# Patient Record
Sex: MALE | Race: WHITE | NOT HISPANIC OR LATINO | Employment: OTHER | URBAN - NONMETROPOLITAN AREA
[De-identification: names, ages, dates, MRNs, and addresses within clinical notes are randomized per-mention and may not be internally consistent; named-entity substitution may affect disease eponyms.]

---

## 2017-02-20 ENCOUNTER — OFFICE VISIT (OUTPATIENT)
Dept: FAMILY MEDICINE CLINIC | Facility: CLINIC | Age: 67
End: 2017-02-20

## 2017-02-20 ENCOUNTER — APPOINTMENT (OUTPATIENT)
Dept: LAB | Facility: HOSPITAL | Age: 67
End: 2017-02-20

## 2017-02-20 VITALS
SYSTOLIC BLOOD PRESSURE: 128 MMHG | DIASTOLIC BLOOD PRESSURE: 80 MMHG | HEIGHT: 68 IN | TEMPERATURE: 97.9 F | BODY MASS INDEX: 34.59 KG/M2 | OXYGEN SATURATION: 98 % | HEART RATE: 55 BPM | WEIGHT: 228.2 LBS

## 2017-02-20 DIAGNOSIS — Z23 NEED FOR SHINGLES VACCINE: ICD-10-CM

## 2017-02-20 DIAGNOSIS — Z23 NEED FOR STREPTOCOCCUS PNEUMONIAE VACCINATION: ICD-10-CM

## 2017-02-20 DIAGNOSIS — R35.1 NOCTURIA: ICD-10-CM

## 2017-02-20 DIAGNOSIS — Z13.6 ENCOUNTER FOR ABDOMINAL AORTIC ANEURYSM (AAA) SCREENING: ICD-10-CM

## 2017-02-20 DIAGNOSIS — E11.9 TYPE 2 DIABETES MELLITUS WITHOUT COMPLICATION, WITHOUT LONG-TERM CURRENT USE OF INSULIN (HCC): ICD-10-CM

## 2017-02-20 DIAGNOSIS — Z00.00 MEDICARE ANNUAL WELLNESS VISIT, SUBSEQUENT: Primary | ICD-10-CM

## 2017-02-20 DIAGNOSIS — Z87.891 FORMER TOBACCO USE: ICD-10-CM

## 2017-02-20 LAB
HBA1C MFR BLD: 6.79 % (ref 4–5.6)
PSA SERPL-MCNC: 0.56 NG/ML (ref 0–4)

## 2017-02-20 PROCEDURE — 36415 COLL VENOUS BLD VENIPUNCTURE: CPT | Performed by: FAMILY MEDICINE

## 2017-02-20 PROCEDURE — G0009 ADMIN PNEUMOCOCCAL VACCINE: HCPCS | Performed by: FAMILY MEDICINE

## 2017-02-20 PROCEDURE — 84153 ASSAY OF PSA TOTAL: CPT | Performed by: FAMILY MEDICINE

## 2017-02-20 PROCEDURE — 83036 HEMOGLOBIN GLYCOSYLATED A1C: CPT | Performed by: FAMILY MEDICINE

## 2017-02-20 PROCEDURE — 90670 PCV13 VACCINE IM: CPT | Performed by: FAMILY MEDICINE

## 2017-02-20 PROCEDURE — G0439 PPPS, SUBSEQ VISIT: HCPCS | Performed by: FAMILY MEDICINE

## 2017-02-20 RX ORDER — GLIPIZIDE 5 MG/1
2.5 TABLET ORAL
Qty: 60 TABLET | Refills: 5 | Status: SHIPPED | OUTPATIENT
Start: 2017-02-20 | End: 2018-02-20 | Stop reason: SDUPTHER

## 2017-02-20 NOTE — PROGRESS NOTES
QUICK REFERENCE INFORMATION:  The ABCs of the Annual Wellness Visit    Subsequent Medicare Wellness Visit    HEALTH RISK ASSESSMENT    Recent Hospitalizations:  No recent hospitalization(s)..      Current Medical Providers:  Patient Care Team:  Nithya Martini DO as PCP - General (Family Medicine)        Smoking Status:  History   Smoking Status   • Former Smoker   Smokeless Tobacco   • Former User   Quit 40 years ago    Alcohol Consumption:  History   Alcohol Use No       Depression Screen:   PHQ-9 Depression Screening 2/20/2017   Little interest or pleasure in doing things 0   Feeling down, depressed, or hopeless 0   Trouble falling or staying asleep, or sleeping too much 0   Feeling tired or having little energy 1   Poor appetite or overeating 1   Feeling bad about yourself - or that you are a failure or have let yourself or your family down 0   Trouble concentrating on things, such as reading the newspaper or watching television 0   Moving or speaking so slowly that other people could have noticed. Or the opposite - being so fidgety or restless that you have been moving around a lot more than usual 0   Thoughts that you would be better off dead, or of hurting yourself in some way 0   PHQ-9 Total Score 2   If you checked off any problems, how difficult have these problems made it for you to do your work, take care of things at home, or get along with other people? Not difficult at all       Health Habits and Functional and Cognitive Screening:  Functional & Cognitive Status 2/20/2017   Do you have difficulty preparing food and eating? No   Do you have difficulty bathing yourself? No   Do you have difficulty getting dressed? No   Do you have difficulty using the toilet? No   Do you have difficulty moving around from place to place? No   In the past year have you fallen or experienced a near fall? Yes   Do you need help using the phone?  No   Are you deaf or do you have serious difficulty hearing?  No   Do you need  help with transportation? No   Do you need help shopping? No   Do you need help preparing meals?  No   Do you need help with housework?  No   Do you need help with laundry? No   Do you need help taking your medications? No   Do you need help managing money? No   Do you have difficulty concentrating, remembering or making decisions? No          Does the patient have evidence of cognitive impairment? No    Asprin use counseling:not applicable    TUG test 9 seconds.     Hearing screen negative.    Recent Lab Results:  CMP:  Lab Results   Component Value Date    BUN 20 11/15/2016    CREATININE 1.0 11/15/2016     11/15/2016    K 5.0 11/15/2016    CO2 29 11/15/2016    CALCIUM 9.2 11/15/2016    ALBUMIN 4.2 11/15/2016    BILITOT 0.7 11/15/2016    ALKPHOS 55 11/15/2016    AST 27 11/15/2016    ALT 34 11/15/2016     Lipid Panel:  Lab Results   Component Value Date    CHLPL 172 04/07/2016    TRIG 195 04/07/2016    HDL 32 (L) 04/07/2016     LDL:     HbA1c:  Lab Results   Component Value Date    HGBA1C 6.79 (H) 02/20/2017     Urine Microalbumin:     Visual Acuity:  No exam data present    Age-appropriate Screening Schedule:  Refer to the list below for future screening recommendations based on patient's age, sex and/or medical conditions. Orders for these recommended tests are listed in the plan section. The patient has been provided with a written plan.    Health Maintenance   Topic Date Due   • TDAP/TD VACCINES (1 - Tdap) 12/05/1968   • PNEUMOCOCCAL VACCINES (65+ LOW/MEDIUM RISK) (1 of 2 - PCV13) 12/05/2014   • DIABETIC EYE EXAM  11/15/2016   • ZOSTER VACCINE  11/15/2016   • URINE MICROALBUMIN  04/07/2017   • HEMOGLOBIN A1C  05/15/2017   • DIABETIC FOOT EXAM  11/15/2017   • COLONOSCOPY  04/19/2022   • INFLUENZA VACCINE  Completed        Subjective   History of Present Illness    Hung Ramsay is a 66 y.o. male who presents for an Subsequent Wellness Visit. In addition, we addressed the following health issues:  Diabetes    Blood sugars running high, sometimes in the high 100's-low 200s.. Needs a1c. No hypoglycemia. Not really following diabetic diet.    The following portions of the patient's history were reviewed and updated as appropriate: allergies, current medications, past family history, past medical history, past social history, past surgical history and problem list.    Outpatient Medications Prior to Visit   Medication Sig Dispense Refill   • aspirin 81 MG EC tablet Take 81 mg by mouth Daily.     • esomeprazole (nexIUM) 40 MG capsule Take 1 capsule by mouth Every Morning Before Breakfast. 90 capsule 1   • fenofibrate (TRICOR) 145 MG tablet Take 1 tablet by mouth Daily. 90 tablet 1   • glipiZIDE (GLUCOTROL) 5 MG tablet Take 0.5 tablets by mouth Daily. 45 tablet 1   • losartan-hydrochlorothiazide (HYZAAR) 100-25 MG per tablet Take 1 tablet by mouth Daily. 90 tablet 1   • metFORMIN (GLUCOPHAGE) 1000 MG tablet Take 1 tablet by mouth 2 (Two) Times a Day With Meals. 180 tablet 1   • metoprolol succinate XL (TOPROL-XL) 100 MG 24 hr tablet Take 1 tablet by mouth Daily. 90 tablet 1   • PARoxetine CR (PAXIL-CR) 25 MG 24 hr tablet Take 1 tablet by mouth Daily. 90 tablet 1   • traZODone (DESYREL) 50 MG tablet Take 1 tablet by mouth Every Night. 90 tablet 1     No facility-administered medications prior to visit.        Patient Active Problem List   Diagnosis   • Essential hypertension   • Diabetes   • GERD (gastroesophageal reflux disease)   • Insomnia   • Anxiety       Identification of Risk Factors:  Risk factors include: weight  and tobacco use.    Review of Systems   Constitutional: Negative for fatigue and fever.   Respiratory: Negative for shortness of breath.    Cardiovascular: Negative for chest pain and leg swelling.   Endocrine: Negative for polydipsia and polyphagia.   Genitourinary: Negative for difficulty urinating and hematuria.        Nocturia (chronic)   Neurological: Negative for dizziness.       Compared to  "one year ago, the patient feels his physical health is the same.  Compared to one year ago, the patient feels his mental health is better.    Objective     Physical Exam   Constitutional: He appears well-developed and well-nourished. No distress.   Cardiovascular: Normal rate, regular rhythm and normal heart sounds.  Exam reveals no gallop and no friction rub.    No murmur heard.  Pulmonary/Chest: Effort normal and breath sounds normal. He has no wheezes. He has no rales.   Musculoskeletal: He exhibits no edema.   Neurological: He is alert.   Skin: Skin is warm and dry. He is not diaphoretic.   Psychiatric: He has a normal mood and affect. His behavior is normal.   Nursing note and vitals reviewed.      Vitals:    02/20/17 1109   BP: 128/80   Pulse: 55   Temp: 97.9 °F (36.6 °C)   SpO2: 98%   Weight: 228 lb 3.2 oz (104 kg)   Height: 68\" (172.7 cm)   PainSc: 0-No pain       Body mass index is 34.7 kg/(m^2).  Discussed the patient's BMI with him. The BMI is above average; BMI management plan is completed.    Assessment/Plan   Patient Self-Management and Personalized Health Advice  The patient has been provided with information about: diet, exercise and weight management and preventive services including:   · Diabetes screening, see lab orders, Exercise counseling provided, Screening for AAA, referral for ultrasound placed.    Visit Diagnoses:    ICD-10-CM ICD-9-CM   1. Medicare annual wellness visit, subsequent Z00.00 V70.0   2. Type 2 diabetes mellitus without complication, without long-term current use of insulin E11.9 250.00   3. Need for Streptococcus pneumoniae vaccination Z23 V03.82   4. Nocturia  R35.1 788.43   5. Need for shingles vaccine Z23 V04.89   6. Encounter for abdominal aortic aneurysm (AAA) screening Z13.6 V81.2   7. Former tobacco use Z87.891 V15.82       Orders Placed This Encounter   Procedures   • US AAA Screen Limited     Order Specific Question:   Reason for Exam:     Answer:   screening AAA, hx " of tobacco use   • Pneumococcal Conjugate Vaccine 13-Valent All   • Hemoglobin A1c   • PSA     Increased Glipizide to 0.5 bid. Checking labs today    Outpatient Encounter Prescriptions as of 2/20/2017   Medication Sig Dispense Refill   • aspirin 81 MG EC tablet Take 81 mg by mouth Daily.     • esomeprazole (nexIUM) 40 MG capsule Take 1 capsule by mouth Every Morning Before Breakfast. 90 capsule 1   • fenofibrate (TRICOR) 145 MG tablet Take 1 tablet by mouth Daily. 90 tablet 1   • glipiZIDE (GLUCOTROL) 5 MG tablet Take 0.5 tablets by mouth 2 (Two) Times a Day Before Meals. 60 tablet 5   • losartan-hydrochlorothiazide (HYZAAR) 100-25 MG per tablet Take 1 tablet by mouth Daily. 90 tablet 1   • metFORMIN (GLUCOPHAGE) 1000 MG tablet Take 1 tablet by mouth 2 (Two) Times a Day With Meals. 180 tablet 1   • metoprolol succinate XL (TOPROL-XL) 100 MG 24 hr tablet Take 1 tablet by mouth Daily. 90 tablet 1   • PARoxetine CR (PAXIL-CR) 25 MG 24 hr tablet Take 1 tablet by mouth Daily. 90 tablet 1   • traZODone (DESYREL) 50 MG tablet Take 1 tablet by mouth Every Night. 90 tablet 1   • [DISCONTINUED] glipiZIDE (GLUCOTROL) 5 MG tablet Take 0.5 tablets by mouth Daily. 45 tablet 1   • zoster vaccine live PF (ZOSTAVAX) 06631 UNT/0.65ML injection Inject 19,400 Units under the skin 1 (One) Time for 1 dose. 68922 Units 0     No facility-administered encounter medications on file as of 2/20/2017.        Reviewed use of high risk medication in the elderly: yes  Reviewed for potential of harmful drug interactions in the elderly: yes    Follow Up:  Return in about 3 months (around 5/20/2017) for Follow up Diabetes, labs few days before.     An After Visit Summary and PPPS with all of these plans were given to the patient.             This document has been electronically signed by Nithya Martini DO on February 20, 2017 5:45 PM

## 2017-02-20 NOTE — PATIENT INSTRUCTIONS
Medicare Wellness  Personal Prevention Plan of Service     Date of Office Visit:  2017  Encounter Provider:  Nithya Martini DO  Place of Service:  [facility]  Patient Name: Hung Ramsay  :  1950    As part of the Medicare Wellness portion of your visit today, we are providing you with this personalized preventive plan of services (PPPS). This plan is based upon recommendations of the [place] Preventive Services Task Force (USPSTF) and the Advisory Committee on Immunization Practices (ACIP).    This lists the preventive care services that should be considered, and provides dates of when you are due. Items listed as completed are up-to-date and do not require any further intervention.    Health Maintenance   Topic Date Due   • TDAP/TD VACCINES (1 - Tdap) 1968   • PNEUMOCOCCAL VACCINES (65+ LOW/MEDIUM RISK) (1 of 2 - PCV13) 2014   • DIABETIC EYE EXAM  11/15/2016   • ZOSTER VACCINE  11/15/2016   • MEDICARE ANNUAL WELLNESS  11/15/2016   • AAA SCREEN (ONE-TIME)  11/15/2016   • URINE MICROALBUMIN  2017   • HEMOGLOBIN A1C  05/15/2017   • DIABETIC FOOT EXAM  11/15/2017   • COLONOSCOPY  2022   • HEPATITIS C SCREENING  Completed   • INFLUENZA VACCINE  Completed         Patient Self-Management and Personalized Health Advice  The patient has been provided with information about: diet, exercise and weight management and preventive services including:   · Diabetes screening, see lab orders, Exercise counseling provided, Screening for AAA, referral for ultrasound placed.    Visit Diagnoses:  No diagnosis found.    No orders of the defined types were placed in this encounter.      Outpatient Encounter Prescriptions as of 2017   Medication Sig Dispense Refill   • aspirin 81 MG EC tablet Take 81 mg by mouth Daily.     • esomeprazole (nexIUM) 40 MG capsule Take 1 capsule by mouth Every Morning Before Breakfast. 90 capsule 1   • fenofibrate  (TRICOR) 145 MG tablet Take 1 tablet by mouth Daily. 90 tablet 1   • glipiZIDE (GLUCOTROL) 5 MG tablet Take 0.5 tablets by mouth Daily. 45 tablet 1   • losartan-hydrochlorothiazide (HYZAAR) 100-25 MG per tablet Take 1 tablet by mouth Daily. 90 tablet 1   • metFORMIN (GLUCOPHAGE) 1000 MG tablet Take 1 tablet by mouth 2 (Two) Times a Day With Meals. 180 tablet 1   • metoprolol succinate XL (TOPROL-XL) 100 MG 24 hr tablet Take 1 tablet by mouth Daily. 90 tablet 1   • PARoxetine CR (PAXIL-CR) 25 MG 24 hr tablet Take 1 tablet by mouth Daily. 90 tablet 1   • traZODone (DESYREL) 50 MG tablet Take 1 tablet by mouth Every Night. 90 tablet 1     No facility-administered encounter medications on file as of 2/20/2017.        Reviewed use of high risk medication in the elderly: yes  Reviewed for potential of harmful drug interactions in the elderly: yes    Follow Up:  No Follow-up on file.     An After Visit Summary and PPPS with all of these plans were given to the patient.

## 2017-03-17 ENCOUNTER — HOSPITAL ENCOUNTER (OUTPATIENT)
Dept: ULTRASOUND IMAGING | Facility: HOSPITAL | Age: 67
Discharge: HOME | End: 2017-03-17
Attending: FAMILY MEDICINE | Admitting: FAMILY MEDICINE

## 2017-03-17 PROCEDURE — 76706 US ABDL AORTA SCREEN AAA: CPT

## 2017-03-28 DIAGNOSIS — E11.9 TYPE 2 DIABETES MELLITUS WITHOUT COMPLICATION, WITHOUT LONG-TERM CURRENT USE OF INSULIN (HCC): ICD-10-CM

## 2017-04-03 ENCOUNTER — OFFICE VISIT (OUTPATIENT)
Dept: FAMILY MEDICINE CLINIC | Facility: CLINIC | Age: 67
End: 2017-04-03

## 2017-04-03 VITALS
BODY MASS INDEX: 34.59 KG/M2 | HEART RATE: 60 BPM | WEIGHT: 228.2 LBS | SYSTOLIC BLOOD PRESSURE: 122 MMHG | HEIGHT: 68 IN | DIASTOLIC BLOOD PRESSURE: 82 MMHG | OXYGEN SATURATION: 98 %

## 2017-04-03 DIAGNOSIS — E11.9 TYPE 2 DIABETES MELLITUS WITHOUT COMPLICATION, WITHOUT LONG-TERM CURRENT USE OF INSULIN (HCC): ICD-10-CM

## 2017-04-03 DIAGNOSIS — I10 ESSENTIAL HYPERTENSION: Primary | ICD-10-CM

## 2017-04-03 DIAGNOSIS — E78.5 HYPERLIPIDEMIA, UNSPECIFIED HYPERLIPIDEMIA TYPE: ICD-10-CM

## 2017-04-03 PROCEDURE — 99214 OFFICE O/P EST MOD 30 MIN: CPT | Performed by: FAMILY MEDICINE

## 2017-04-03 RX ORDER — PRAVASTATIN SODIUM 40 MG
TABLET ORAL
Qty: 90 TABLET | Refills: 3 | Status: SHIPPED | OUTPATIENT
Start: 2017-04-03 | End: 2017-07-03 | Stop reason: SDUPTHER

## 2017-06-28 DIAGNOSIS — E11.9 TYPE 2 DIABETES MELLITUS WITHOUT COMPLICATION, WITHOUT LONG-TERM CURRENT USE OF INSULIN (HCC): ICD-10-CM

## 2017-07-03 ENCOUNTER — OFFICE VISIT (OUTPATIENT)
Dept: FAMILY MEDICINE CLINIC | Facility: CLINIC | Age: 67
End: 2017-07-03

## 2017-07-03 VITALS
SYSTOLIC BLOOD PRESSURE: 140 MMHG | HEIGHT: 68 IN | WEIGHT: 229.9 LBS | DIASTOLIC BLOOD PRESSURE: 88 MMHG | OXYGEN SATURATION: 97 % | BODY MASS INDEX: 34.84 KG/M2 | HEART RATE: 72 BPM

## 2017-07-03 DIAGNOSIS — E78.5 HYPERLIPIDEMIA, UNSPECIFIED HYPERLIPIDEMIA TYPE: ICD-10-CM

## 2017-07-03 DIAGNOSIS — I10 ESSENTIAL HYPERTENSION: ICD-10-CM

## 2017-07-03 DIAGNOSIS — E11.9 TYPE 2 DIABETES MELLITUS WITHOUT COMPLICATION, WITHOUT LONG-TERM CURRENT USE OF INSULIN (HCC): Primary | ICD-10-CM

## 2017-07-03 PROCEDURE — 99213 OFFICE O/P EST LOW 20 MIN: CPT | Performed by: FAMILY MEDICINE

## 2017-07-03 RX ORDER — PRAVASTATIN SODIUM 40 MG
40 TABLET ORAL NIGHTLY
Qty: 90 TABLET | Refills: 3
Start: 2017-07-03 | End: 2018-02-20 | Stop reason: SDUPTHER

## 2017-07-03 NOTE — PROGRESS NOTES
Subjective   Chief Complaint   Patient presents with   • Hypertension     3 month rechk        Hung Ramsay is a 66 y.o. male who presents for Hypertension (3 month rechk )   Hypertension   This is a chronic problem. The problem is controlled. Pertinent negatives include no chest pain, headaches, palpitations, peripheral edema or shortness of breath. There are no associated agents to hypertension. Risk factors for coronary artery disease include diabetes mellitus and male gender. The current treatment provides significant improvement. There are no compliance problems.    Hyperlipidemia   This is a chronic problem. The current episode started more than 1 year ago. The problem is controlled. Exacerbating diseases include diabetes. Pertinent negatives include no chest pain, myalgias or shortness of breath. Current antihyperlipidemic treatment includes statins (no side effects reported). There are no compliance problems.      Blood sugar has been well controlled per patient. He will make appt for DM eye exam with is doctor    The following portions of the patient's history were reviewed and updated as appropriate:problem list, current medications, allergies, past family history, past medical history, past social history and past surgical history    Past Medical History:   Diagnosis Date   • Anxiety    • Diabetes    • Essential hypertension    • GERD (gastroesophageal reflux disease)    • Insomnia        Social History   Substance Use Topics   • Smoking status: Former Smoker   • Smokeless tobacco: Former User   • Alcohol use No       Medications:  Outpatient Medications Prior to Visit   Medication Sig Dispense Refill   • aspirin 81 MG EC tablet Take 81 mg by mouth Daily.     • esomeprazole (nexIUM) 40 MG capsule Take 1 capsule by mouth Every Morning Before Breakfast. 90 capsule 1   • glipiZIDE (GLUCOTROL) 5 MG tablet Take 0.5 tablets by mouth 2 (Two) Times a Day Before Meals. 60 tablet 5   •  "losartan-hydrochlorothiazide (HYZAAR) 100-25 MG per tablet Take 1 tablet by mouth Daily. 90 tablet 1   • metFORMIN (GLUCOPHAGE) 1000 MG tablet TAKE 1 TABLET BY MOUTH 2 (TWO) TIMES A DAY WITH MEALS. 180 tablet 1   • metoprolol succinate XL (TOPROL-XL) 100 MG 24 hr tablet Take 1 tablet by mouth Daily. 90 tablet 1   • PARoxetine CR (PAXIL-CR) 25 MG 24 hr tablet Take 1 tablet by mouth Daily. 90 tablet 1   • traZODone (DESYREL) 50 MG tablet Take 1 tablet by mouth Every Night. 90 tablet 1   • pravastatin (PRAVACHOL) 40 MG tablet Take 0.5 tab po daily x 1 week, then 1 tab po daily 90 tablet 3     No facility-administered medications prior to visit.        No Known Allergies    Review of Systems   Constitutional: Negative for fatigue, fever and unexpected weight change.   HENT: Negative for rhinorrhea.    Eyes: Negative for visual disturbance.   Respiratory: Negative for cough and shortness of breath.    Cardiovascular: Negative for chest pain, palpitations and leg swelling.   Gastrointestinal: Negative for abdominal pain, blood in stool, constipation and diarrhea.   Endocrine: Negative for polydipsia, polyphagia and polyuria.   Genitourinary: Negative for difficulty urinating.   Musculoskeletal: Negative for arthralgias, back pain and myalgias.   Skin: Negative for rash.   Neurological: Negative for headaches.   Psychiatric/Behavioral: Negative for sleep disturbance. The patient is not nervous/anxious.        Objective   Visit Vitals   • /88   • Pulse 72   • Ht 68\" (172.7 cm)   • Wt 229 lb 14.4 oz (104 kg)   • SpO2 97%   • BMI 34.96 kg/m2       Physical Exam   Constitutional: He is oriented to person, place, and time. He appears well-developed and well-nourished. No distress.   HENT:   Head: Normocephalic and atraumatic.   Nose: Nose normal.   Eyes: Conjunctivae and EOM are normal.   Neck: Normal range of motion.   Cardiovascular: Normal rate, regular rhythm and normal heart sounds.  Exam reveals no gallop and no " friction rub.    No murmur heard.  Pulmonary/Chest: Effort normal and breath sounds normal. No respiratory distress. He has no wheezes. He has no rales.   Musculoskeletal: Normal range of motion. He exhibits no edema.   Neurological: He is alert and oriented to person, place, and time. No cranial nerve deficit.   Skin: Skin is warm and dry. He is not diaphoretic.   Psychiatric: He has a normal mood and affect. His behavior is normal.   Nursing note and vitals reviewed.      Assessment/Plan   Hung Ramsay is a 66 y.o. male seen today for the followin. Type 2 diabetes mellitus without complication, without long-term current use of insulin  Labs today. He will schedule eye exam    - Hemoglobin A1c  - Comprehensive Metabolic Panel  - Lipid Panel  - MicroAlbumin, Urine, Random  - pravastatin (PRAVACHOL) 40 MG tablet; Take 1 tablet by mouth Every Night.  Dispense: 90 tablet; Refill: 3    2. Hyperlipidemia, unspecified hyperlipidemia type  Repeat lipids    - Lipid Panel  - pravastatin (PRAVACHOL) 40 MG tablet; Take 1 tablet by mouth Every Night.  Dispense: 90 tablet; Refill: 3    3. Essential hypertension  Controlled        Follow up: Return in about 6 months (around 1/3/2018) for Recheck.          This document has been electronically signed by Nithya Martini DO on July 3, 2017 8:17 AM

## 2017-08-08 DIAGNOSIS — G47.00 INSOMNIA, UNSPECIFIED TYPE: ICD-10-CM

## 2017-08-08 DIAGNOSIS — K21.9 GASTROESOPHAGEAL REFLUX DISEASE WITHOUT ESOPHAGITIS: ICD-10-CM

## 2017-08-08 DIAGNOSIS — F41.9 ANXIETY: ICD-10-CM

## 2017-08-08 RX ORDER — TRAZODONE HYDROCHLORIDE 50 MG/1
TABLET ORAL
Qty: 90 TABLET | Refills: 1 | Status: SHIPPED | OUTPATIENT
Start: 2017-08-08 | End: 2018-02-20 | Stop reason: SDUPTHER

## 2017-08-08 RX ORDER — FENOFIBRATE 145 MG/1
TABLET, COATED ORAL
Qty: 90 TABLET | Refills: 1 | Status: SHIPPED | OUTPATIENT
Start: 2017-08-08 | End: 2018-02-20

## 2017-08-08 RX ORDER — ESOMEPRAZOLE MAGNESIUM 40 MG/1
CAPSULE, DELAYED RELEASE ORAL
Qty: 90 CAPSULE | Refills: 1 | Status: SHIPPED | OUTPATIENT
Start: 2017-08-08 | End: 2018-02-20 | Stop reason: SDUPTHER

## 2017-09-17 DIAGNOSIS — I10 ESSENTIAL HYPERTENSION: ICD-10-CM

## 2017-09-18 RX ORDER — METOPROLOL SUCCINATE 100 MG/1
TABLET, EXTENDED RELEASE ORAL
Qty: 90 TABLET | Refills: 1 | Status: SHIPPED | OUTPATIENT
Start: 2017-09-18 | End: 2018-02-20 | Stop reason: SDUPTHER

## 2017-11-08 DIAGNOSIS — F41.9 ANXIETY: ICD-10-CM

## 2017-11-08 RX ORDER — PAROXETINE HYDROCHLORIDE HEMIHYDRATE 25 MG/1
TABLET, FILM COATED, EXTENDED RELEASE ORAL
Qty: 90 TABLET | Refills: 1 | Status: SHIPPED | OUTPATIENT
Start: 2017-11-08 | End: 2018-02-20 | Stop reason: SDUPTHER

## 2017-12-22 DIAGNOSIS — E11.9 TYPE 2 DIABETES MELLITUS WITHOUT COMPLICATION, WITHOUT LONG-TERM CURRENT USE OF INSULIN (HCC): ICD-10-CM

## 2018-02-02 DIAGNOSIS — F41.9 ANXIETY: ICD-10-CM

## 2018-02-02 DIAGNOSIS — K21.9 GASTROESOPHAGEAL REFLUX DISEASE WITHOUT ESOPHAGITIS: ICD-10-CM

## 2018-02-02 DIAGNOSIS — G47.00 INSOMNIA, UNSPECIFIED TYPE: ICD-10-CM

## 2018-02-02 RX ORDER — TRAZODONE HYDROCHLORIDE 50 MG/1
TABLET ORAL
Qty: 90 TABLET | Refills: 1 | OUTPATIENT
Start: 2018-02-02

## 2018-02-02 RX ORDER — ESOMEPRAZOLE MAGNESIUM 40 MG/1
CAPSULE, DELAYED RELEASE ORAL
Qty: 90 CAPSULE | Refills: 1 | OUTPATIENT
Start: 2018-02-02

## 2018-02-02 RX ORDER — FENOFIBRATE 145 MG/1
TABLET, COATED ORAL
Qty: 90 TABLET | Refills: 1 | OUTPATIENT
Start: 2018-02-02

## 2018-02-13 ENCOUNTER — TELEPHONE (OUTPATIENT)
Dept: FAMILY MEDICINE CLINIC | Facility: CLINIC | Age: 68
End: 2018-02-13

## 2018-02-20 ENCOUNTER — OFFICE VISIT (OUTPATIENT)
Dept: FAMILY MEDICINE CLINIC | Facility: CLINIC | Age: 68
End: 2018-02-20

## 2018-02-20 VITALS
HEART RATE: 60 BPM | OXYGEN SATURATION: 96 % | BODY MASS INDEX: 34.22 KG/M2 | WEIGHT: 225.8 LBS | HEIGHT: 68 IN | DIASTOLIC BLOOD PRESSURE: 88 MMHG | SYSTOLIC BLOOD PRESSURE: 148 MMHG

## 2018-02-20 DIAGNOSIS — I10 ESSENTIAL HYPERTENSION: ICD-10-CM

## 2018-02-20 DIAGNOSIS — I51.7 LVH (LEFT VENTRICULAR HYPERTROPHY): ICD-10-CM

## 2018-02-20 DIAGNOSIS — K21.9 GASTROESOPHAGEAL REFLUX DISEASE WITHOUT ESOPHAGITIS: ICD-10-CM

## 2018-02-20 DIAGNOSIS — E11.9 TYPE 2 DIABETES MELLITUS WITHOUT COMPLICATION, WITHOUT LONG-TERM CURRENT USE OF INSULIN (HCC): Primary | ICD-10-CM

## 2018-02-20 DIAGNOSIS — I44.0 1ST DEGREE AV BLOCK: ICD-10-CM

## 2018-02-20 DIAGNOSIS — F41.9 ANXIETY: ICD-10-CM

## 2018-02-20 DIAGNOSIS — Z51.81 MEDICATION MONITORING ENCOUNTER: ICD-10-CM

## 2018-02-20 DIAGNOSIS — E78.5 HYPERLIPIDEMIA, UNSPECIFIED HYPERLIPIDEMIA TYPE: ICD-10-CM

## 2018-02-20 DIAGNOSIS — G47.00 INSOMNIA, UNSPECIFIED TYPE: ICD-10-CM

## 2018-02-20 PROCEDURE — 99214 OFFICE O/P EST MOD 30 MIN: CPT | Performed by: FAMILY MEDICINE

## 2018-02-20 PROCEDURE — 93005 ELECTROCARDIOGRAM TRACING: CPT | Performed by: FAMILY MEDICINE

## 2018-02-20 PROCEDURE — 93010 ELECTROCARDIOGRAM REPORT: CPT | Performed by: INTERNAL MEDICINE

## 2018-02-20 RX ORDER — GLIPIZIDE 5 MG/1
2.5 TABLET ORAL
Qty: 90 TABLET | Refills: 1 | Status: SHIPPED | OUTPATIENT
Start: 2018-02-20 | End: 2018-08-17 | Stop reason: SDUPTHER

## 2018-02-20 RX ORDER — TRAZODONE HYDROCHLORIDE 50 MG/1
50 TABLET ORAL NIGHTLY
Qty: 90 TABLET | Refills: 1 | Status: SHIPPED | OUTPATIENT
Start: 2018-02-20 | End: 2018-08-17 | Stop reason: SDUPTHER

## 2018-02-20 RX ORDER — HYDRALAZINE HYDROCHLORIDE 25 MG/1
25 TABLET, FILM COATED ORAL 3 TIMES DAILY
Qty: 90 TABLET | Refills: 5 | Status: SHIPPED | OUTPATIENT
Start: 2018-02-20 | End: 2018-08-13 | Stop reason: SDUPTHER

## 2018-02-20 RX ORDER — ESOMEPRAZOLE MAGNESIUM 40 MG/1
40 CAPSULE, DELAYED RELEASE ORAL
Qty: 90 CAPSULE | Refills: 1 | Status: SHIPPED | OUTPATIENT
Start: 2018-02-20 | End: 2018-08-17 | Stop reason: SDUPTHER

## 2018-02-20 RX ORDER — LOSARTAN POTASSIUM AND HYDROCHLOROTHIAZIDE 25; 100 MG/1; MG/1
1 TABLET ORAL DAILY
Qty: 90 TABLET | Refills: 1 | Status: SHIPPED | OUTPATIENT
Start: 2018-02-20 | End: 2018-08-17 | Stop reason: SDUPTHER

## 2018-02-20 RX ORDER — PAROXETINE HYDROCHLORIDE HEMIHYDRATE 25 MG/1
25 TABLET, FILM COATED, EXTENDED RELEASE ORAL EVERY MORNING
Qty: 90 TABLET | Refills: 1 | Status: SHIPPED | OUTPATIENT
Start: 2018-02-20 | End: 2018-10-30 | Stop reason: SDUPTHER

## 2018-02-20 RX ORDER — PRAVASTATIN SODIUM 40 MG
40 TABLET ORAL NIGHTLY
Qty: 90 TABLET | Refills: 1 | Status: SHIPPED | OUTPATIENT
Start: 2018-02-20 | End: 2018-09-13 | Stop reason: SDUPTHER

## 2018-02-20 RX ORDER — METOPROLOL SUCCINATE 100 MG/1
100 TABLET, EXTENDED RELEASE ORAL DAILY
Qty: 90 TABLET | Refills: 1 | Status: SHIPPED | OUTPATIENT
Start: 2018-02-20 | End: 2018-02-20

## 2018-02-20 NOTE — PROGRESS NOTES
Subjective   Chief Complaint   Patient presents with   • Diabetes     check up       Hung Ramsay is a 67 y.o. male who presents for Diabetes (check up)   Diabetes   He presents for his follow-up diabetic visit. He has type 2 diabetes mellitus. There are no hypoglycemic associated symptoms. Pertinent negatives for hypoglycemia include no headaches or nervousness/anxiousness. Pertinent negatives for diabetes include no chest pain, no fatigue, no foot paresthesias, no polydipsia, no polyphagia and no polyuria. Current diabetic treatment includes oral agent (dual therapy). He is compliant with treatment some of the time. He is following a diabetic diet. He participates in exercise weekly. (-200; not checking post prandials) An ACE inhibitor/angiotensin II receptor blocker is being taken. Eye exam is current.   Hypertension   This is a chronic problem. The problem is controlled. Pertinent negatives include no chest pain, headaches, palpitations, peripheral edema or shortness of breath. There are no associated agents to hypertension. There are no compliance problems.      He states that he has not been compliant with metformin, he is only taking it once a day.    Antidepressants working well. Needs EKG for QT monitoring on paxil, trazodone and beta blocker. Denies any chest pain, sob, dizziness, palpitations    The following portions of the patient's history were reviewed and updated as appropriate:problem list, current medications, allergies, past family history, past medical history, past social history and past surgical history    Past Medical History:   Diagnosis Date   • Anxiety    • Diabetes    • Essential hypertension    • GERD (gastroesophageal reflux disease)    • Insomnia        Social History   Substance Use Topics   • Smoking status: Former Smoker   • Smokeless tobacco: Former User   • Alcohol use No     History   Sexual Activity   • Sexual activity: Not on file       Medications:  Outpatient  Medications Prior to Visit   Medication Sig Dispense Refill   • aspirin 81 MG EC tablet Take 81 mg by mouth Daily.     • esomeprazole (nexIUM) 40 MG capsule TAKE 1 CAPSULE BY MOUTH EVERY MORNING BEFORE BREAKFAST. 90 capsule 1   • glipiZIDE (GLUCOTROL) 5 MG tablet Take 0.5 tablets by mouth 2 (Two) Times a Day Before Meals. 60 tablet 5   • losartan-hydrochlorothiazide (HYZAAR) 100-25 MG per tablet Take 1 tablet by mouth Daily. 90 tablet 1   • metFORMIN (GLUCOPHAGE) 1000 MG tablet TAKE 1 TABLET BY MOUTH 2 (TWO) TIMES A DAY WITH MEALS. 180 tablet 1   • metoprolol succinate XL (TOPROL-XL) 100 MG 24 hr tablet TAKE 1 TABLET BY MOUTH DAILY. 90 tablet 1   • PARoxetine CR (PAXIL-CR) 25 MG 24 hr tablet TAKE 1 TABLET BY MOUTH DAILY. 90 tablet 1   • pravastatin (PRAVACHOL) 40 MG tablet Take 1 tablet by mouth Every Night. 90 tablet 3   • traZODone (DESYREL) 50 MG tablet TAKE 1 TABLET BY MOUTH EVERY NIGHT. 90 tablet 1   • fenofibrate (TRICOR) 145 MG tablet TAKE 1 TABLET BY MOUTH DAILY. 90 tablet 1     No facility-administered medications prior to visit.        No Known Allergies    Review of Systems   Constitutional: Negative for fatigue, fever and unexpected weight change.   HENT: Negative for rhinorrhea.    Eyes: Negative for visual disturbance.   Respiratory: Negative for cough and shortness of breath.    Cardiovascular: Negative for chest pain, palpitations and leg swelling.   Gastrointestinal: Negative for abdominal pain, blood in stool, constipation and diarrhea.   Endocrine: Negative for polydipsia, polyphagia and polyuria.   Genitourinary: Negative for difficulty urinating.   Musculoskeletal: Negative for arthralgias and back pain.   Skin: Negative for rash.   Neurological: Negative for headaches.   Psychiatric/Behavioral: Negative for sleep disturbance. The patient is not nervous/anxious.        Objective   Visit Vitals   • /88 (BP Location: Left arm, Patient Position: Sitting, Cuff Size: Large Adult)   • Pulse 60  "  • Ht 172.7 cm (67.99\")   • Wt 102 kg (225 lb 12.8 oz)   • SpO2 96%   • BMI 34.34 kg/m2       Physical Exam   Constitutional: He is oriented to person, place, and time. He appears well-developed and well-nourished. No distress.   HENT:   Head: Normocephalic and atraumatic.   Nose: Nose normal.   Eyes: Conjunctivae and EOM are normal.   Neck: Normal range of motion.   Cardiovascular: Normal rate, regular rhythm and normal heart sounds.  Exam reveals no gallop and no friction rub.    No murmur heard.  Pulmonary/Chest: Effort normal and breath sounds normal. No respiratory distress. He has no wheezes. He has no rales.   Musculoskeletal: Normal range of motion. He exhibits no edema.   Neurological: He is alert and oriented to person, place, and time. No cranial nerve deficit.   Skin: Skin is warm and dry. He is not diaphoretic.   Psychiatric: He has a normal mood and affect. His behavior is normal.   Nursing note and vitals reviewed.      Assessment/Plan   Hung Ramsay is a 67 y.o. male seen today for the following:     Diagnosis Plan   1. Type 2 diabetes mellitus without complication, without long-term current use of insulin  glipiZIDE (GLUCOTROL) 5 MG tablet    metFORMIN (GLUCOPHAGE) 1000 MG tablet    Hemoglobin A1c    Comprehensive Metabolic Panel    Lipid Panel    MicroAlbumin, Urine, Random - Urine, Clean Catch   2. Gastroesophageal reflux disease without esophagitis  esomeprazole (nexIUM) 40 MG capsule   3. Essential hypertension  losartan-hydrochlorothiazide (HYZAAR) 100-25 MG per tablet    hydrALAZINE (APRESOLINE) 25 MG tablet   4. Anxiety  PARoxetine CR (PAXIL-CR) 25 MG 24 hr tablet    traZODone (DESYREL) 50 MG tablet   5. Hyperlipidemia, unspecified hyperlipidemia type  pravastatin (PRAVACHOL) 40 MG tablet   6. Insomnia, unspecified type  traZODone (DESYREL) 50 MG tablet   7. Medication monitoring encounter  ECG 12 Lead   8. 1st degree AV block  Ambulatory Referral to Cardiology   9. LVH (left " ventricular hypertrophy)  Ambulatory Referral to Cardiology     He will start taking metformin twice a day and start checking blood sugars and bring to follow up visit in 4 weeks.   EKG done today for med monitoring showed sinus bradycardia at rate 56, prolonged OH interval, and LVH. Discussed findings with patient. Will stop metoprolol and add hydralazine. He will check bp at home and call me with readings in 1-2 days to see if med needs to be adjusted. Have also referred him to cardiology for further workup regarding findings on ekg     Patient is aware that I will be leaving the practice in the next few months and they will establish with another PCP.   Follow up with primary care in 1 months.    Follow up: Return in about 4 weeks (around 3/20/2018) for Follow up Diabetes.            This document has been electronically signed by Nithya Martini DO on February 21, 2018 8:42 AM

## 2018-02-27 ENCOUNTER — DOCUMENTATION (OUTPATIENT)
Dept: CARDIOLOGY | Facility: CLINIC | Age: 68
End: 2018-02-27

## 2018-02-27 ENCOUNTER — OFFICE VISIT (OUTPATIENT)
Dept: CARDIOLOGY | Facility: CLINIC | Age: 68
End: 2018-02-27

## 2018-02-27 VITALS
HEIGHT: 68 IN | BODY MASS INDEX: 33.95 KG/M2 | SYSTOLIC BLOOD PRESSURE: 158 MMHG | DIASTOLIC BLOOD PRESSURE: 76 MMHG | HEART RATE: 74 BPM | WEIGHT: 224 LBS

## 2018-02-27 DIAGNOSIS — E78.2 MIXED HYPERLIPIDEMIA: ICD-10-CM

## 2018-02-27 DIAGNOSIS — R94.31 ABNORMAL EKG: ICD-10-CM

## 2018-02-27 DIAGNOSIS — I10 ESSENTIAL HYPERTENSION: Primary | ICD-10-CM

## 2018-02-27 DIAGNOSIS — E11.9 TYPE 2 DIABETES MELLITUS WITHOUT COMPLICATION, WITHOUT LONG-TERM CURRENT USE OF INSULIN (HCC): ICD-10-CM

## 2018-02-27 DIAGNOSIS — R07.2 PRECORDIAL PAIN: ICD-10-CM

## 2018-02-27 PROCEDURE — 99203 OFFICE O/P NEW LOW 30 MIN: CPT | Performed by: INTERNAL MEDICINE

## 2018-02-27 NOTE — PROGRESS NOTES
Hung Ramsay  67 y.o. male    02/27/2018  1. Essential hypertension    2. Mixed hyperlipidemia    3. Precordial pain    4. Type 2 diabetes mellitus without complication, without long-term current use of insulin    5. Abnormal EKG        History of Present Illness    Mr. Ramsay is a 67-year-old male was referred by Dr. Martini evaluation of an abnormal EKG noted recently at her office.  EKG showed sinus bradycardia with mortgage criteria for left ventricular hypertrophy with repolarization abnormalities.  The patient has had intermittent episodes of left-sided pressure-like chest pain going back several months which has no definite pattern to it.  It is unrelated to exertion and lasts for varying periods.  He has multiple risk factors for coronary artery disease including age, diabetes mellitus, hypertension and remote history of smoking.  He has been compliant with his medications.  His had diabetes and hypertension for more than 20 years.        SUBJECTIVE    No Known Allergies      Past Medical History:   Diagnosis Date   • Anxiety    • Diabetes    • Essential hypertension    • GERD (gastroesophageal reflux disease)    • Insomnia          Past Surgical History:   Procedure Laterality Date   • COLECTOMY PARTIAL / TOTAL     • ENDOSCOPY AND COLONOSCOPY  04/19/2012         Family History   Problem Relation Age of Onset   • Diabetes Mother    • Hypertension Mother    • Heart disease Mother    • Cancer Father          Social History     Social History   • Marital status:      Spouse name: N/A   • Number of children: N/A   • Years of education: N/A     Occupational History   • Not on file.     Social History Main Topics   • Smoking status: Former Smoker   • Smokeless tobacco: Former User   • Alcohol use No   • Drug use: No   • Sexual activity: Defer     Other Topics Concern   • Not on file     Social History Narrative         Current Outpatient Prescriptions   Medication Sig Dispense Refill   • aspirin 81 MG  "EC tablet Take 81 mg by mouth Daily.     • esomeprazole (nexIUM) 40 MG capsule Take 1 capsule by mouth Every Morning Before Breakfast. 90 capsule 1   • glipiZIDE (GLUCOTROL) 5 MG tablet Take 0.5 tablets by mouth 2 (Two) Times a Day Before Meals. 90 tablet 1   • hydrALAZINE (APRESOLINE) 25 MG tablet Take 1 tablet by mouth 3 (Three) Times a Day. 90 tablet 5   • losartan-hydrochlorothiazide (HYZAAR) 100-25 MG per tablet Take 1 tablet by mouth Daily. 90 tablet 1   • metFORMIN (GLUCOPHAGE) 1000 MG tablet Take 1 tablet by mouth 2 (Two) Times a Day With Meals. 180 tablet 1   • PARoxetine CR (PAXIL-CR) 25 MG 24 hr tablet Take 1 tablet by mouth Every Morning. 90 tablet 1   • pravastatin (PRAVACHOL) 40 MG tablet Take 1 tablet by mouth Every Night. 90 tablet 1   • traZODone (DESYREL) 50 MG tablet Take 1 tablet by mouth Every Night. 90 tablet 1     No current facility-administered medications for this visit.          OBJECTIVE    /76  Pulse 74  Ht 172.7 cm (68\")  Wt 102 kg (224 lb)  BMI 34.06 kg/m2        Review of Systems     Constitutional:  Denies recent weight loss, weight gain, fever or chills, no change in exercise tolerance     HENT:  Denies any hearing loss, epistaxis, hoarseness, or difficulty speaking.     Eyes: Wears eyeglasses or contact lenses     Respiratory:  Denies dyspnea with exertion,no cough, wheezing, or hemoptysis.     Cardiovascular: Negative for palpations, orthopnea, PND, peripheral edema, syncope, or claudication.     Gastrointestinal:  Denies change in bowel habits, dyspepsia, ulcer disease, hematochezia, or melena.     Endocrine: Negative for cold intolerance, heat intolerance, polydipsia, polyphagia and polyuria.     Genitourinary: Negative.      Musculoskeletal: Denies any history of arthritic symptoms or back problems     Skin:  Denies any change in hair or nails, rashes, or skin lesions.     Allergic/Immunologic: Negative.  Negative for environmental allergies, food allergies and " immunocompromised state.     Neurological:  Denies any history of recurrent headaches, strokes, TIA, or seizure disorder.     Hematological: Denies any food allergies, seasonal allergies, bleeding disorders, or lymphadenopathy.     Psychiatric/Behavioral:History of anxiety      Physical Exam     Constitutional: Cooperative, alert and oriented, well-developed, well-nourished, in no acute distress.     HENT:   Head: Normocephalic, normal hair patterns, no masses or tenderness.  Ears, Nose, and Throat: No gross abnormalities. No pallor or cyanosis.  Eyes: EOMS intact, PERRL, conjunctivae and lids unremarkable. Fundoscopic exam and visual fields not performed.   Neck: No palpable masses or adenopathy, no thyromegaly, no JVD, carotid pulses are full and equal bilaterally and without  Bruits.     Cardiovascular: Regular rhythm, S1 and S2 normal, no S3 or S4. No murmurs, gallops, or rubs detected.     Pulmonary/Chest: Chest: normal symmetry, no tenderness to palpation, normal respiratory excursion,  no use of accessory muscles.            Pulmonary: Normal breath sounds. No rales or ronchi.    Abdominal: Abdomen soft, bowel sounds normoactive, no masses, no hepatosplenomegaly, non-tender, no bruits.     Musculoskeletal: No deformities, clubbing, cyanosis, erythema, or edema observed. There are no spinal abnormalities noted.     Neurological: No gross motor or sensory deficits noted, affect appropriate, oriented to time, person, place.     Skin: Warm and dry to the touch, no apparent skin lesions or masses noted.     Psychiatric: He has a normal mood and affect. His behavior is normal. Judgment and thought content normal.         Procedures      Lab Results   Component Value Date    WBC 7.5 04/07/2016    HGB 15.3 04/07/2016    HCT 43.5 04/07/2016    MCV 88.1 04/07/2016     04/07/2016     Lab Results   Component Value Date    GLUCOSE 250 (H) 11/15/2016    BUN 20 11/15/2016    CREATININE 1.0 11/15/2016    CO2 29  11/15/2016    CALCIUM 9.2 11/15/2016    ALBUMIN 4.2 11/15/2016    AST 27 11/15/2016    ALT 34 11/15/2016     No results found for: CHOL  Lab Results   Component Value Date    TRIG 195 04/07/2016    TRIG 212 (H) 03/03/2015    TRIG 209 (H) 04/29/2014     Lab Results   Component Value Date    HDL 32 (L) 04/07/2016    HDL 28 (L) 03/03/2015    HDL 28 (L) 04/29/2014     No components found for: LDLCALC  Lab Results   Component Value Date     04/07/2016     03/03/2015    LDL 81 04/29/2014     No results found for: HDLLDLRATIO  No components found for: CHOLHDL  Lab Results   Component Value Date    HGBA1C 6.79 (H) 02/20/2017     Lab Results   Component Value Date    TSH 1.53 04/07/2016           ASSESSMENT AND PLAN  Mr. Ramsay does have multiple risk factors for coronary artery disease and his chest pressure/pain does have atypical features.  However given his age and risk factors would be prudent to proceed with an exercise Cardiolite stress test to further risk stratify him and an echocardiogram to assess left ventricular and valvular function has been arranged.  Further recommendations will follow.  I suspect that his EKG changes are secondary to hypertension.  Thank you for asking me to see this patient.    Hung was seen today for irregular heart beat.    Diagnoses and all orders for this visit:    Essential hypertension  -     Stress Test With Myocardial Perfusion One Day; Future  -     Adult Transthoracic Echo Complete W/ Cont if Necessary Per Protocol; Future    Mixed hyperlipidemia  -     Stress Test With Myocardial Perfusion One Day; Future  -     Adult Transthoracic Echo Complete W/ Cont if Necessary Per Protocol; Future    Precordial pain  -     Stress Test With Myocardial Perfusion One Day; Future  -     Adult Transthoracic Echo Complete W/ Cont if Necessary Per Protocol; Future    Type 2 diabetes mellitus without complication, without long-term current use of insulin  -     Stress Test With  Myocardial Perfusion One Day; Future  -     Adult Transthoracic Echo Complete W/ Cont if Necessary Per Protocol; Future    Abnormal EKG  -     Stress Test With Myocardial Perfusion One Day; Future  -     Adult Transthoracic Echo Complete W/ Cont if Necessary Per Protocol; Future        Radha Carroll MD  2/27/2018  8:20 AM

## 2018-03-13 ENCOUNTER — APPOINTMENT (OUTPATIENT)
Dept: NUCLEAR MEDICINE | Facility: HOSPITAL | Age: 68
End: 2018-03-13

## 2018-03-13 ENCOUNTER — HOSPITAL ENCOUNTER (OUTPATIENT)
Dept: NUCLEAR MEDICINE | Facility: HOSPITAL | Age: 68
Discharge: HOME | End: 2018-03-13

## 2018-03-13 ENCOUNTER — APPOINTMENT (OUTPATIENT)
Dept: CARDIOLOGY | Facility: HOSPITAL | Age: 68
End: 2018-03-13

## 2018-03-13 DIAGNOSIS — E78.2 MIXED HYPERLIPIDEMIA: ICD-10-CM

## 2018-03-13 DIAGNOSIS — E11.9 TYPE 2 DIABETES MELLITUS WITHOUT COMPLICATION, WITHOUT LONG-TERM CURRENT USE OF INSULIN (HCC): ICD-10-CM

## 2018-03-13 DIAGNOSIS — I10 ESSENTIAL HYPERTENSION: ICD-10-CM

## 2018-03-13 DIAGNOSIS — R07.2 PRECORDIAL PAIN: ICD-10-CM

## 2018-03-13 DIAGNOSIS — R94.31 ABNORMAL EKG: ICD-10-CM

## 2018-03-13 PROCEDURE — A9500 TC99M SESTAMIBI: HCPCS | Performed by: INTERNAL MEDICINE

## 2018-03-13 PROCEDURE — 0 TECHNETIUM SESTAMIBI: Performed by: INTERNAL MEDICINE

## 2018-03-13 RX ADMIN — TECHNETIUM TC 99M SESTAMIBI 1 DOSE: 1 INJECTION INTRAVENOUS at 07:43

## 2018-03-20 ENCOUNTER — OFFICE VISIT (OUTPATIENT)
Dept: FAMILY MEDICINE CLINIC | Facility: CLINIC | Age: 68
End: 2018-03-20

## 2018-03-20 VITALS
RESPIRATION RATE: 18 BRPM | HEART RATE: 68 BPM | BODY MASS INDEX: 33.99 KG/M2 | HEIGHT: 68 IN | WEIGHT: 224.3 LBS | DIASTOLIC BLOOD PRESSURE: 78 MMHG | SYSTOLIC BLOOD PRESSURE: 140 MMHG | OXYGEN SATURATION: 99 % | TEMPERATURE: 97.5 F

## 2018-03-20 DIAGNOSIS — I10 ESSENTIAL HYPERTENSION: ICD-10-CM

## 2018-03-20 DIAGNOSIS — Z76.89 ENCOUNTER TO ESTABLISH CARE: ICD-10-CM

## 2018-03-20 DIAGNOSIS — E11.9 TYPE 2 DIABETES MELLITUS WITHOUT COMPLICATION, WITHOUT LONG-TERM CURRENT USE OF INSULIN (HCC): ICD-10-CM

## 2018-03-20 PROCEDURE — 99213 OFFICE O/P EST LOW 20 MIN: CPT | Performed by: NURSE PRACTITIONER

## 2018-04-03 ENCOUNTER — LAB (OUTPATIENT)
Dept: LAB | Facility: HOSPITAL | Age: 68
End: 2018-04-03

## 2018-04-03 DIAGNOSIS — E78.5 HYPERLIPIDEMIA, UNSPECIFIED HYPERLIPIDEMIA TYPE: ICD-10-CM

## 2018-04-03 DIAGNOSIS — E11.9 TYPE 2 DIABETES MELLITUS WITHOUT COMPLICATION, WITHOUT LONG-TERM CURRENT USE OF INSULIN (HCC): ICD-10-CM

## 2018-04-03 LAB
ALBUMIN SERPL-MCNC: 4.7 G/DL (ref 3.4–4.8)
ALBUMIN UR-MCNC: 2.4 MG/L
ALBUMIN/GLOB SERPL: 1.6 G/DL (ref 1.1–1.8)
ALP SERPL-CCNC: 79 U/L (ref 38–126)
ALT SERPL W P-5'-P-CCNC: 33 U/L (ref 21–72)
ANION GAP SERPL CALCULATED.3IONS-SCNC: 18 MMOL/L (ref 5–15)
ARTICHOKE IGE QN: 53 MG/DL (ref 1–129)
AST SERPL-CCNC: 27 U/L (ref 17–59)
BILIRUB SERPL-MCNC: 0.5 MG/DL (ref 0.2–1.3)
BUN BLD-MCNC: 22 MG/DL (ref 7–21)
BUN/CREAT SERPL: 21 (ref 7–25)
CALCIUM SPEC-SCNC: 9.3 MG/DL (ref 8.4–10.2)
CHLORIDE SERPL-SCNC: 98 MMOL/L (ref 95–110)
CHOLEST SERPL-MCNC: 124 MG/DL (ref 0–199)
CO2 SERPL-SCNC: 26 MMOL/L (ref 22–31)
CREAT BLD-MCNC: 1.05 MG/DL (ref 0.7–1.3)
GFR SERPL CREATININE-BSD FRML MDRD: 70 ML/MIN/1.73 (ref 49–113)
GLOBULIN UR ELPH-MCNC: 3 GM/DL (ref 2.3–3.5)
GLUCOSE BLD-MCNC: 140 MG/DL (ref 60–100)
HBA1C MFR BLD: 6.7 % (ref 4–5.6)
HDLC SERPL-MCNC: 28 MG/DL (ref 60–200)
LDLC/HDLC SERPL: 1.54 {RATIO} (ref 0–3.55)
POTASSIUM BLD-SCNC: 4.4 MMOL/L (ref 3.5–5.1)
PROT SERPL-MCNC: 7.7 G/DL (ref 6.3–8.6)
SODIUM BLD-SCNC: 142 MMOL/L (ref 137–145)
TRIGL SERPL-MCNC: 264 MG/DL (ref 20–199)

## 2018-04-03 PROCEDURE — 83036 HEMOGLOBIN GLYCOSYLATED A1C: CPT

## 2018-04-03 PROCEDURE — 80061 LIPID PANEL: CPT

## 2018-04-03 PROCEDURE — 36415 COLL VENOUS BLD VENIPUNCTURE: CPT

## 2018-04-03 PROCEDURE — 82043 UR ALBUMIN QUANTITATIVE: CPT

## 2018-04-03 PROCEDURE — 80053 COMPREHEN METABOLIC PANEL: CPT

## 2018-04-24 ENCOUNTER — OFFICE VISIT (OUTPATIENT)
Dept: CARDIOLOGY | Facility: CLINIC | Age: 68
End: 2018-04-24

## 2018-04-24 VITALS
DIASTOLIC BLOOD PRESSURE: 70 MMHG | BODY MASS INDEX: 34.84 KG/M2 | WEIGHT: 222 LBS | SYSTOLIC BLOOD PRESSURE: 152 MMHG | HEIGHT: 67 IN | OXYGEN SATURATION: 97 % | HEART RATE: 73 BPM

## 2018-04-24 DIAGNOSIS — E78.2 MIXED HYPERLIPIDEMIA: ICD-10-CM

## 2018-04-24 DIAGNOSIS — R94.31 ABNORMAL EKG: ICD-10-CM

## 2018-04-24 DIAGNOSIS — I10 ESSENTIAL HYPERTENSION: Primary | ICD-10-CM

## 2018-04-24 PROCEDURE — 99213 OFFICE O/P EST LOW 20 MIN: CPT | Performed by: INTERNAL MEDICINE

## 2018-04-24 NOTE — PROGRESS NOTES
Hung Ramsay  67 y.o. male    04/24/2018  1. Essential hypertension    2. Mixed hyperlipidemia    3. Abnormal EKG        History of Present Illness  Mr. Ramsay was seen by me back in February 2018 when he was referred by his primary care physician for evaluation of a borderline EKG.  EKG showed sinus rhythm with left ventricle hypertrophy and repolarization changes.  He did have risk factors for CAD including hypertension, hyperlipidemia and diabetes mellitus and hence an echocardiogram and an exercise Cardiolite stress test was recommended.  He did have atypical chest pressure as well.  The echocardiogram showed the following findings:  · Left ventricular wall thickness is consistent with mild concentric hypertrophy.  · Mild tricuspid valve regurgitation is present.  · Mild mitral valve regurgitation is present  · Left ventricular systolic function is normal. Estimated EF = 60%.  · Left ventricular diastolic dysfunction (grade I a) consistent with impaired relaxation.  · Left atrial cavity size is borderline dilated.     However, the patient was unable to perform the exercise Cardiolite stress test secondary to claustrophobia and hence at this time to be cancelled.  The patient denied any chest pain and informs me that he is physically very active walking about a mile 3 times a week and working out at [employer].  He denied any chest pain or shortness of breath.  His been compliant with her medications.        SUBJECTIVE    No Known Allergies      Past Medical History:   Diagnosis Date   • Anxiety    • Diabetes    • Essential hypertension    • GERD (gastroesophageal reflux disease)    • Insomnia          Past Surgical History:   Procedure Laterality Date   • COLECTOMY PARTIAL / TOTAL     • ENDOSCOPY AND COLONOSCOPY  04/19/2012         Family History   Problem Relation Age of Onset   • Diabetes Mother    • Hypertension Mother    • Heart disease Mother    • Cancer Father          Social History     Social  "History   • Marital status:      Spouse name: N/A   • Number of children: N/A   • Years of education: N/A     Occupational History   • Not on file.     Social History Main Topics   • Smoking status: Former Smoker   • Smokeless tobacco: Former User   • Alcohol use No   • Drug use: No   • Sexual activity: Defer     Other Topics Concern   • Not on file     Social History Narrative   • No narrative on file         Current Outpatient Prescriptions   Medication Sig Dispense Refill   • aspirin 81 MG EC tablet Take 81 mg by mouth Daily.     • esomeprazole (nexIUM) 40 MG capsule Take 1 capsule by mouth Every Morning Before Breakfast. 90 capsule 1   • glipiZIDE (GLUCOTROL) 5 MG tablet Take 0.5 tablets by mouth 2 (Two) Times a Day Before Meals. 90 tablet 1   • hydrALAZINE (APRESOLINE) 25 MG tablet Take 1 tablet by mouth 3 (Three) Times a Day. 90 tablet 5   • losartan-hydrochlorothiazide (HYZAAR) 100-25 MG per tablet Take 1 tablet by mouth Daily. 90 tablet 1   • metFORMIN (GLUCOPHAGE) 1000 MG tablet Take 1 tablet by mouth 2 (Two) Times a Day With Meals. 180 tablet 1   • PARoxetine CR (PAXIL-CR) 25 MG 24 hr tablet Take 1 tablet by mouth Every Morning. 90 tablet 1   • pravastatin (PRAVACHOL) 40 MG tablet Take 1 tablet by mouth Every Night. 90 tablet 1   • traZODone (DESYREL) 50 MG tablet Take 1 tablet by mouth Every Night. 90 tablet 1     No current facility-administered medications for this visit.          OBJECTIVE    /70 (BP Location: Left arm, Patient Position: Sitting, Cuff Size: Adult)   Pulse 73   Ht 170.2 cm (67\")   Wt 101 kg (222 lb)   SpO2 97%   BMI 34.77 kg/m²         Review of Systems     Constitutional:  Denies recent weight loss, weight gain, fever or chills, no change in exercise tolerance     HENT:  Denies any hearing loss, epistaxis, hoarseness, or difficulty speaking.     Eyes: Wears eyeglasses or contact lenses     Respiratory:  Denies dyspnea with exertion,no cough, wheezing, or hemoptysis. "     Cardiovascular: Negative for palpations, chest pain, orthopnea, PND, peripheral edema, syncope, or claudication.     Gastrointestinal:  Denies change in bowel habits, dyspepsia, ulcer disease, hematochezia, or melena.     Endocrine: Negative for cold intolerance, heat intolerance, polydipsia, polyphagia and polyuria.     Genitourinary: Negative.      Musculoskeletal: Denies any history of arthritic symptoms or back problems     Skin:  Denies any change in hair or nails, rashes, or skin lesions.     Allergic/Immunologic: Negative.  Negative for environmental allergies, food allergies and immunocompromised state.     Neurological:  Denies any history of recurrent headaches, strokes, TIA, or seizure disorder.     Hematological: Denies any food allergies, seasonal allergies, bleeding disorders, or lymphadenopathy.     Psychiatric/Behavioral: Denies any history of depression, substance abuse, or change in cognitive function.         Physical Exam     Constitutional: Cooperative, alert and oriented, well-developed, well-nourished, in no acute distress.     HENT:   Head: Normocephalic, normal hair patterns, no masses or tenderness.  Ears, Nose, and Throat: No gross abnormalities. No pallor or cyanosis.  Eyes: EOMS intact, PERRL, conjunctivae and lids unremarkable. Fundoscopic exam and visual fields not performed.   Neck: No palpable masses or adenopathy, no thyromegaly, no JVD, carotid pulses are full and equal bilaterally and without  Bruits.     Cardiovascular: Regular rhythm, S1 and S2 normal, no S3 or S4. Apical impulse not displaced. No murmurs, gallops, or rubs detected.     Pulmonary/Chest: Chest: normal symmetry, no tenderness to palpation, normal respiratory excursion, no intercostal retraction, no use of accessory muscles.            Pulmonary: Normal breath sounds. No rales or ronchi.    Abdominal: Abdomen soft, bowel sounds normoactive, no masses, no hepatosplenomegaly, non-tender, no bruits.      Musculoskeletal: No deformities, clubbing, cyanosis, erythema, or edema observed. There are no spinal abnormalities noted. Normal muscle strength and tone. Pulses full and equal in all extremities, no bruits auscultated.     Neurological: No gross motor or sensory deficits noted, affect appropriate, oriented to time, person, place.     Skin: Warm and dry to the touch, no apparent skin lesions or masses noted.     Psychiatric: He has a normal mood and affect. His behavior is normal. Judgment and thought content normal.         Procedures      Lab Results   Component Value Date    WBC 7.5 04/07/2016    HGB 15.3 04/07/2016    HCT 43.5 04/07/2016    MCV 88.1 04/07/2016     04/07/2016     Lab Results   Component Value Date    GLUCOSE 140 (H) 04/03/2018    BUN 22 (H) 04/03/2018    CREATININE 1.05 04/03/2018    EGFRIFNONA 70 04/03/2018    BCR 21.0 04/03/2018    CO2 26.0 04/03/2018    CALCIUM 9.3 04/03/2018    ALBUMIN 4.70 04/03/2018    LABIL2 1.6 04/03/2018    AST 27 04/03/2018    ALT 33 04/03/2018     Lab Results   Component Value Date    CHOL 124 04/03/2018     Lab Results   Component Value Date    TRIG 264 (H) 04/03/2018    TRIG 195 04/07/2016    TRIG 212 (H) 03/03/2015     Lab Results   Component Value Date    HDL 28 (L) 04/03/2018    HDL 32 (L) 04/07/2016    HDL 28 (L) 03/03/2015     No components found for: LDLCALC  Lab Results   Component Value Date    LDL 53 04/03/2018     04/07/2016     03/03/2015     No results found for: HDLLDLRATIO  No components found for: CHOLHDL  Lab Results   Component Value Date    HGBA1C 6.7 (H) 04/03/2018     Lab Results   Component Value Date    TSH 1.53 04/07/2016           ASSESSMENT AND PLAN  Mr. Ramsay does have risk factors for CAD but appears to be stable from a cardiac standpoint with no definite evidence of angina, arrhythmia or congestive heart failure.  His present medications including aspirin, antihypertensive therapy with hydralazine, losartan HCTZ  and lipid-lowering therapy with pravastatin have been continued.  Since he is physically quite active without any restriction, no further cardiac testing is being arranged at this time.  If he has any change in symptoms further testing will be considered.  He'll be reassessed in a year.    Hung was seen today for hypertension and follow-up.    Diagnoses and all orders for this visit:    Essential hypertension    Mixed hyperlipidemia    Abnormal EKG        Radha Carroll MD  4/24/2018  9:35 AM

## 2018-05-31 ENCOUNTER — OFFICE VISIT (OUTPATIENT)
Dept: FAMILY MEDICINE CLINIC | Facility: CLINIC | Age: 68
End: 2018-05-31

## 2018-05-31 VITALS
DIASTOLIC BLOOD PRESSURE: 80 MMHG | WEIGHT: 223 LBS | BODY MASS INDEX: 33.8 KG/M2 | HEIGHT: 68 IN | SYSTOLIC BLOOD PRESSURE: 130 MMHG

## 2018-05-31 DIAGNOSIS — M21.962 ACQUIRED DEFORMITY OF LEFT ANKLE AND FOOT: Primary | ICD-10-CM

## 2018-05-31 PROCEDURE — 99213 OFFICE O/P EST LOW 20 MIN: CPT | Performed by: NURSE PRACTITIONER

## 2018-05-31 NOTE — PROGRESS NOTES
"Subjective   Hung Ramsay is a 67 y.o. male.  Complain so left ankle  \" aggravating because when I walk I am walking on the side of my ankle.\"  Onset of symptoms \"years ago but is getting worse over the last couple of years.  Mode any pain or discomfort.      Lower Extremity Issue   This is a chronic problem. The current episode started more than 1 year ago. The problem occurs constantly. The problem has been gradually worsening. Pertinent negatives include no arthralgias, joint swelling or myalgias. The symptoms are aggravated by walking. He has tried nothing for the symptoms. The treatment provided no relief.        The following portions of the patient's history were reviewed and updated as appropriate: allergies, current medications, past family history, past medical history, past social history, past surgical history and problem list.    Review of Systems   Constitutional: Negative.    Respiratory: Negative.    Cardiovascular: Negative.    Musculoskeletal: Positive for gait problem. Negative for arthralgias, joint swelling and myalgias.   Skin: Negative.        Objective   Physical Exam   Constitutional: He is oriented to person, place, and time. He appears well-developed and well-nourished.   HENT:   Head: Normocephalic.   Cardiovascular: Normal rate, regular rhythm and normal heart sounds.    Pulmonary/Chest: Effort normal and breath sounds normal.   Musculoskeletal: He exhibits deformity.   Witnessed ambulation across the room with left foot turning medial with each step.  Left lateral malleolus appears larger than right lateral malleolus in comparison      Foot Structure and Biomechanics -  His right foot exhibits hammer toes.     Neurological: He is alert and oriented to person, place, and time.   Skin: Skin is warm.   Psychiatric: He has a normal mood and affect.       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  Acquired deformity of left ankle and foot:  Complete x-ray of left ankle and foot and call with results when available  Discussed possibility of referral to podiatry  Discussed possibility of referral to physical therapy for custom orthotics  Keep already scheduled upcoming appointment with this office for follow-up      This document has been electronically signed by GEMINI Red on May 31, 2018 2:32 PM

## 2018-06-01 DIAGNOSIS — M77.32 CALCANEAL SPUR OF LEFT FOOT: Primary | ICD-10-CM

## 2018-06-04 ENCOUNTER — TELEPHONE (OUTPATIENT)
Dept: FAMILY MEDICINE CLINIC | Facility: CLINIC | Age: 68
End: 2018-06-04

## 2018-06-04 NOTE — TELEPHONE ENCOUNTER
Per GEMINI Núñez,  Hung Ramsay was called and given recent XRay results. Informed patient that areferral has been placed for podiatry and the podiatry office will call him to schedule an appt.  Patient verbalized understanding. Continue current meds and follow up as planned or sooner if any problems.

## 2018-06-18 ENCOUNTER — OFFICE VISIT (OUTPATIENT)
Dept: PODIATRY | Facility: CLINIC | Age: 68
End: 2018-06-18

## 2018-06-18 VITALS
DIASTOLIC BLOOD PRESSURE: 84 MMHG | HEART RATE: 55 BPM | OXYGEN SATURATION: 95 % | WEIGHT: 223.8 LBS | BODY MASS INDEX: 33.92 KG/M2 | SYSTOLIC BLOOD PRESSURE: 186 MMHG | HEIGHT: 68 IN

## 2018-06-18 DIAGNOSIS — M79.672 LEFT FOOT PAIN: ICD-10-CM

## 2018-06-18 DIAGNOSIS — M25.572 LEFT ANKLE PAIN, UNSPECIFIED CHRONICITY: Primary | ICD-10-CM

## 2018-06-18 DIAGNOSIS — M21.542 ANKLE VARUS, ACQUIRED, LEFT: ICD-10-CM

## 2018-06-18 PROCEDURE — 99203 OFFICE O/P NEW LOW 30 MIN: CPT | Performed by: PODIATRIST

## 2018-06-18 NOTE — PROGRESS NOTES
Hung Ramsay  1950  67 y.o. male   MATEO Ramsay 5/31/2018  A1c 6.7 (april 2018)  BS - 197 per pt    Patient presents today with a complaint of his left foot curving inward.    06/18/2018    Chief Complaint   Patient presents with   • Left Foot - Lower Extremity Issue       History of Present Illness    Hung Ramsay is a 67 y.o.male who presents to clinic with chief complaint of left ankle deformity.  Patient states that he is having interning at the left ankle.  It is progressively getting worse.  Currently he rates his pain as a 0 out of 10.  However, his pain is increased with prolonged weightbearing.  He states that he walks on the outside of his left foot.  He denies any injuries or trauma recently.  Relates to several ankle sprains as an adolescent.  He also admits to being a diabetic and states that his blood sugars are fairly well controlled although he does not check them regularly.  He has no other pedal complaints.      Past Medical History:   Diagnosis Date   • Anxiety    • Diabetes    • Essential hypertension    • GERD (gastroesophageal reflux disease)    • Insomnia          Past Surgical History:   Procedure Laterality Date   • COLECTOMY PARTIAL / TOTAL     • ENDOSCOPY AND COLONOSCOPY  04/19/2012         Family History   Problem Relation Age of Onset   • Diabetes Mother    • Hypertension Mother    • Heart disease Mother    • Cancer Father        No Known Allergies    Social History     Social History   • Marital status:      Spouse name: N/A   • Number of children: N/A   • Years of education: N/A     Occupational History   • Not on file.     Social History Main Topics   • Smoking status: Former Smoker   • Smokeless tobacco: Former User   • Alcohol use No   • Drug use: No   • Sexual activity: Defer     Other Topics Concern   • Not on file     Social History Narrative   • No narrative on file         Current Outpatient Prescriptions   Medication Sig Dispense Refill   • aspirin 81 MG  "EC tablet Take 81 mg by mouth Daily.     • esomeprazole (nexIUM) 40 MG capsule Take 1 capsule by mouth Every Morning Before Breakfast. 90 capsule 1   • glipiZIDE (GLUCOTROL) 5 MG tablet Take 0.5 tablets by mouth 2 (Two) Times a Day Before Meals. 90 tablet 1   • hydrALAZINE (APRESOLINE) 25 MG tablet Take 1 tablet by mouth 3 (Three) Times a Day. 90 tablet 5   • losartan-hydrochlorothiazide (HYZAAR) 100-25 MG per tablet Take 1 tablet by mouth Daily. 90 tablet 1   • metFORMIN (GLUCOPHAGE) 1000 MG tablet Take 1 tablet by mouth 2 (Two) Times a Day With Meals. 180 tablet 1   • PARoxetine CR (PAXIL-CR) 25 MG 24 hr tablet Take 1 tablet by mouth Every Morning. 90 tablet 1   • pravastatin (PRAVACHOL) 40 MG tablet Take 1 tablet by mouth Every Night. 90 tablet 1   • traZODone (DESYREL) 50 MG tablet Take 1 tablet by mouth Every Night. 90 tablet 1     No current facility-administered medications for this visit.          OBJECTIVE    BP (!) 186/84   Pulse 55   Ht 172.7 cm (68\")   Wt 102 kg (223 lb 12.8 oz)   SpO2 95%   BMI 34.03 kg/m²       Review of Systems   Constitutional: Negative.    HENT: Negative.    Eyes: Negative.    Respiratory: Negative.    Cardiovascular: Negative.    Gastrointestinal: Negative.    Endocrine: Negative.    Genitourinary: Negative.    Musculoskeletal: Positive for arthralgias.        Left foot pain; 0/10 today   Skin: Negative.    Neurological: Negative.    Psychiatric/Behavioral: Negative.            Physical Exam    Assistive Device: none    Lower Extremity    Cardiovascular:    DP/PT pulses palpable    CFT brisk  to all digits  Skin temp is warm to warm from proximal tibia to distal digits  Pedal hair growth present.   No erythema or edema noted     Musculoskeletal:  Muscle strength is 5/5 for all muscle groups tested   ROM of the 1st MTP is full without pain or crepitus  ROM of the MTJ is full without pain or crepitus    ROM of the STJ is full without pain or crepitus    ROM of the ankle joint is " full without pain or crepitus    No pain on palpation noted    Varus deformity at the ankle L>R    Dermatological:   Webspaces 1-4 bilateral are clean, dry and intact.   No subcutaneous nodules or masses noted    No open wounds noted     Neurological:   Protective sensation intact    Sensation intact to light touch      Radiographs: 3 weightbearing views of the left foot and ankle were obtained today.  Varus deformity noted at the ankle with gapping of the tibiotalar joint laterally.  Degeneration of the tibiotalar joint noted.  No acute osseous abnormality is noted.        Procedures        ASSESSMENT AND PLAN    Hung was seen today for lower extremity issue.    Diagnoses and all orders for this visit:    Left ankle pain, unspecified chronicity  -     XR Ankle 3+ View Left    Left foot pain  -     XR Foot 3+ View Left    Ankle varus, acquired, left      - Comprehensive foot and ankle exam performed.   - X-rays taken and reviewed.  - Discussion held with patient regarding conservative and surgical options to treat his left ankle deformity.  Options discussed included bracing versus lateral ankle stabilization procedures.  Patient has elected for bracing at this time.  - Rx for AFO.  Patient would greatly benefit from custom brace that stabilizes the ankle joint to allow him more efficient and pain-free ambulation.  - All questions were answered to the patients satisfaction.  - RTC afte obtaining custom brace.  Dispensed lace up ankle brace in the meantime.           This document has been electronically signed by Dontae Cha DPM on June 18, 2018 9:56 AM     6/18/2018  9:56 AM

## 2018-08-13 DIAGNOSIS — I10 ESSENTIAL HYPERTENSION: ICD-10-CM

## 2018-08-13 RX ORDER — HYDRALAZINE HYDROCHLORIDE 25 MG/1
25 TABLET, FILM COATED ORAL 3 TIMES DAILY
Qty: 90 TABLET | Refills: 5 | Status: SHIPPED | OUTPATIENT
Start: 2018-08-13 | End: 2019-06-20 | Stop reason: SDUPTHER

## 2018-08-17 DIAGNOSIS — F41.9 ANXIETY: ICD-10-CM

## 2018-08-17 DIAGNOSIS — G47.00 INSOMNIA, UNSPECIFIED TYPE: ICD-10-CM

## 2018-08-17 DIAGNOSIS — E11.9 TYPE 2 DIABETES MELLITUS WITHOUT COMPLICATION, WITHOUT LONG-TERM CURRENT USE OF INSULIN (HCC): ICD-10-CM

## 2018-08-17 DIAGNOSIS — I10 ESSENTIAL HYPERTENSION: ICD-10-CM

## 2018-08-17 DIAGNOSIS — K21.9 GASTROESOPHAGEAL REFLUX DISEASE WITHOUT ESOPHAGITIS: ICD-10-CM

## 2018-08-17 RX ORDER — TRAZODONE HYDROCHLORIDE 50 MG/1
50 TABLET ORAL NIGHTLY
Qty: 90 TABLET | Refills: 1 | Status: SHIPPED | OUTPATIENT
Start: 2018-08-17 | End: 2019-05-03 | Stop reason: SDUPTHER

## 2018-08-17 RX ORDER — GLIPIZIDE 5 MG/1
2.5 TABLET ORAL
Qty: 90 TABLET | Refills: 1 | Status: SHIPPED | OUTPATIENT
Start: 2018-08-17 | End: 2018-09-20 | Stop reason: SDUPTHER

## 2018-08-17 RX ORDER — ESOMEPRAZOLE MAGNESIUM 40 MG/1
40 CAPSULE, DELAYED RELEASE ORAL
Qty: 90 CAPSULE | Refills: 1 | Status: SHIPPED | OUTPATIENT
Start: 2018-08-17 | End: 2018-11-12

## 2018-08-17 RX ORDER — LOSARTAN POTASSIUM AND HYDROCHLOROTHIAZIDE 25; 100 MG/1; MG/1
1 TABLET ORAL DAILY
Qty: 90 TABLET | Refills: 1 | Status: SHIPPED | OUTPATIENT
Start: 2018-08-17 | End: 2019-06-20 | Stop reason: SDUPTHER

## 2018-09-13 DIAGNOSIS — E78.5 HYPERLIPIDEMIA, UNSPECIFIED HYPERLIPIDEMIA TYPE: ICD-10-CM

## 2018-09-13 RX ORDER — PRAVASTATIN SODIUM 40 MG
40 TABLET ORAL NIGHTLY
Qty: 90 TABLET | Refills: 1 | Status: SHIPPED | OUTPATIENT
Start: 2018-09-13 | End: 2019-04-07 | Stop reason: SDUPTHER

## 2018-09-20 ENCOUNTER — TELEPHONE (OUTPATIENT)
Dept: FAMILY MEDICINE CLINIC | Facility: CLINIC | Age: 68
End: 2018-09-20

## 2018-09-20 ENCOUNTER — OFFICE VISIT (OUTPATIENT)
Dept: FAMILY MEDICINE CLINIC | Facility: CLINIC | Age: 68
End: 2018-09-20

## 2018-09-20 ENCOUNTER — APPOINTMENT (OUTPATIENT)
Dept: LAB | Facility: HOSPITAL | Age: 68
End: 2018-09-20

## 2018-09-20 VITALS
WEIGHT: 217.2 LBS | SYSTOLIC BLOOD PRESSURE: 138 MMHG | DIASTOLIC BLOOD PRESSURE: 84 MMHG | BODY MASS INDEX: 32.92 KG/M2 | HEIGHT: 68 IN

## 2018-09-20 DIAGNOSIS — I10 ESSENTIAL HYPERTENSION: Primary | ICD-10-CM

## 2018-09-20 DIAGNOSIS — L57.0 ACTINIC KERATOSIS: ICD-10-CM

## 2018-09-20 DIAGNOSIS — E11.9 TYPE 2 DIABETES MELLITUS WITHOUT COMPLICATION, WITHOUT LONG-TERM CURRENT USE OF INSULIN (HCC): ICD-10-CM

## 2018-09-20 DIAGNOSIS — E78.2 MIXED HYPERLIPIDEMIA: ICD-10-CM

## 2018-09-20 DIAGNOSIS — R35.0 URINARY FREQUENCY: ICD-10-CM

## 2018-09-20 DIAGNOSIS — Z12.5 ENCOUNTER FOR SCREENING FOR MALIGNANT NEOPLASM OF PROSTATE: ICD-10-CM

## 2018-09-20 DIAGNOSIS — M25.562 ACUTE PAIN OF LEFT KNEE: ICD-10-CM

## 2018-09-20 LAB
ALBUMIN SERPL-MCNC: 4.3 G/DL (ref 3.4–4.8)
ALBUMIN/GLOB SERPL: 1.3 G/DL (ref 1.1–1.8)
ALP SERPL-CCNC: 88 U/L (ref 38–126)
ALT SERPL W P-5'-P-CCNC: 22 U/L (ref 21–72)
ANION GAP SERPL CALCULATED.3IONS-SCNC: 10 MMOL/L (ref 5–15)
ARTICHOKE IGE QN: 55 MG/DL (ref 1–129)
AST SERPL-CCNC: 33 U/L (ref 17–59)
BASOPHILS # BLD AUTO: 0.02 10*3/MM3 (ref 0–0.2)
BASOPHILS NFR BLD AUTO: 0.2 % (ref 0–2)
BILIRUB SERPL-MCNC: 0.7 MG/DL (ref 0.2–1.3)
BILIRUB UR QL STRIP: NEGATIVE
BUN BLD-MCNC: 18 MG/DL (ref 7–21)
BUN/CREAT SERPL: 17.8 (ref 7–25)
CALCIUM SPEC-SCNC: 8.9 MG/DL (ref 8.4–10.2)
CHLORIDE SERPL-SCNC: 99 MMOL/L (ref 95–110)
CHOLEST SERPL-MCNC: 122 MG/DL (ref 0–199)
CLARITY UR: CLEAR
CO2 SERPL-SCNC: 28 MMOL/L (ref 22–31)
COLOR UR: YELLOW
CREAT BLD-MCNC: 1.01 MG/DL (ref 0.7–1.3)
DEPRECATED RDW RBC AUTO: 44.9 FL (ref 35.1–43.9)
EOSINOPHIL # BLD AUTO: 0.14 10*3/MM3 (ref 0–0.7)
EOSINOPHIL NFR BLD AUTO: 1.5 % (ref 0–7)
ERYTHROCYTE [DISTWIDTH] IN BLOOD BY AUTOMATED COUNT: 13.7 % (ref 11.5–14.5)
GFR SERPL CREATININE-BSD FRML MDRD: 73 ML/MIN/1.73 (ref 49–113)
GLOBULIN UR ELPH-MCNC: 3.3 GM/DL (ref 2.3–3.5)
GLUCOSE BLD-MCNC: 235 MG/DL (ref 60–100)
GLUCOSE UR STRIP-MCNC: ABNORMAL MG/DL
HBA1C MFR BLD: 8.1 % (ref 4–5.6)
HCT VFR BLD AUTO: 44.8 % (ref 39–49)
HDLC SERPL-MCNC: 30 MG/DL (ref 60–200)
HGB BLD-MCNC: 16 G/DL (ref 13.7–17.3)
HGB UR QL STRIP.AUTO: NEGATIVE
IMM GRANULOCYTES # BLD: 0.05 10*3/MM3 (ref 0–0.02)
IMM GRANULOCYTES NFR BLD: 0.5 % (ref 0–0.5)
KETONES UR QL STRIP: NEGATIVE
LDLC/HDLC SERPL: 1.69 {RATIO} (ref 0–3.55)
LEUKOCYTE ESTERASE UR QL STRIP.AUTO: NEGATIVE
LYMPHOCYTES # BLD AUTO: 1.8 10*3/MM3 (ref 0.6–4.2)
LYMPHOCYTES NFR BLD AUTO: 19.1 % (ref 10–50)
MCH RBC QN AUTO: 31.8 PG (ref 26.5–34)
MCHC RBC AUTO-ENTMCNC: 35.7 G/DL (ref 31.5–36.3)
MCV RBC AUTO: 89.1 FL (ref 80–98)
MONOCYTES # BLD AUTO: 0.61 10*3/MM3 (ref 0–0.9)
MONOCYTES NFR BLD AUTO: 6.5 % (ref 0–12)
NEUTROPHILS # BLD AUTO: 6.81 10*3/MM3 (ref 2–8.6)
NEUTROPHILS NFR BLD AUTO: 72.2 % (ref 37–80)
NITRITE UR QL STRIP: NEGATIVE
PH UR STRIP.AUTO: <=5 [PH] (ref 5–9)
PLATELET # BLD AUTO: 195 10*3/MM3 (ref 150–450)
PMV BLD AUTO: 11.3 FL (ref 8–12)
POTASSIUM BLD-SCNC: 4.2 MMOL/L (ref 3.5–5.1)
PROT SERPL-MCNC: 7.6 G/DL (ref 6.3–8.6)
PROT UR QL STRIP: NEGATIVE
PSA SERPL-MCNC: 0.64 NG/ML (ref 0–4)
RBC # BLD AUTO: 5.03 10*6/MM3 (ref 4.37–5.74)
SODIUM BLD-SCNC: 137 MMOL/L (ref 137–145)
SP GR UR STRIP: 1.02 (ref 1–1.03)
TRIGL SERPL-MCNC: 207 MG/DL (ref 20–199)
UROBILINOGEN UR QL STRIP: ABNORMAL
WBC NRBC COR # BLD: 9.43 10*3/MM3 (ref 3.2–9.8)

## 2018-09-20 PROCEDURE — 80061 LIPID PANEL: CPT | Performed by: NURSE PRACTITIONER

## 2018-09-20 PROCEDURE — G0103 PSA SCREENING: HCPCS | Performed by: NURSE PRACTITIONER

## 2018-09-20 PROCEDURE — 83036 HEMOGLOBIN GLYCOSYLATED A1C: CPT | Performed by: NURSE PRACTITIONER

## 2018-09-20 PROCEDURE — 96372 THER/PROPH/DIAG INJ SC/IM: CPT | Performed by: NURSE PRACTITIONER

## 2018-09-20 PROCEDURE — 85025 COMPLETE CBC W/AUTO DIFF WBC: CPT | Performed by: NURSE PRACTITIONER

## 2018-09-20 PROCEDURE — 99214 OFFICE O/P EST MOD 30 MIN: CPT | Performed by: NURSE PRACTITIONER

## 2018-09-20 PROCEDURE — 17110 DESTRUCTION B9 LES UP TO 14: CPT | Performed by: NURSE PRACTITIONER

## 2018-09-20 PROCEDURE — 81003 URINALYSIS AUTO W/O SCOPE: CPT | Performed by: NURSE PRACTITIONER

## 2018-09-20 PROCEDURE — 80053 COMPREHEN METABOLIC PANEL: CPT | Performed by: NURSE PRACTITIONER

## 2018-09-20 RX ORDER — GLIPIZIDE 5 MG/1
5 TABLET ORAL
Qty: 60 TABLET | Refills: 3 | Status: SHIPPED | OUTPATIENT
Start: 2018-09-20 | End: 2018-12-17 | Stop reason: SDUPTHER

## 2018-09-20 RX ORDER — TRIAMCINOLONE ACETONIDE 40 MG/ML
60 INJECTION, SUSPENSION INTRA-ARTICULAR; INTRAMUSCULAR ONCE
Status: COMPLETED | OUTPATIENT
Start: 2018-09-20 | End: 2018-09-20

## 2018-09-20 RX ADMIN — TRIAMCINOLONE ACETONIDE 60 MG: 40 INJECTION, SUSPENSION INTRA-ARTICULAR; INTRAMUSCULAR at 09:28

## 2018-09-20 NOTE — PROGRESS NOTES
"Subjective   Hung Ramsay is a 67 y.o. male.  Six month follow-up for HTN and diabetes.  For the past \"two to three months my left knee has been hurting a lot.  I am also peeing a lot.  Maybe 8 or 9 times a day.\" Complains of \"scab on my head that has been there for several years\".      Diabetes   He presents for his follow-up diabetic visit. He has type 2 diabetes mellitus. His disease course has been stable. Pertinent negatives for hypoglycemia include no confusion, headaches or nervousness/anxiousness. Pertinent negatives for diabetes include no chest pain, no fatigue, no polydipsia, no polyphagia and no polyuria. There are no hypoglycemic complications. Symptoms are stable. There are no diabetic complications. Risk factors for coronary artery disease include diabetes mellitus, dyslipidemia, male sex, obesity, hypertension and family history. Current diabetic treatment includes oral agent (dual therapy). He is compliant with treatment all of the time. His weight is stable. He is following a generally healthy diet. Meal planning includes avoidance of concentrated sweets. There is no change in his home blood glucose trend. An ACE inhibitor/angiotensin II receptor blocker is being taken.   Hypertension   This is a new problem. The current episode started more than 1 month ago. The problem has been gradually improving since onset. The problem is controlled. Pertinent negatives include no chest pain, headaches, palpitations or shortness of breath. There are no associated agents to hypertension. Risk factors for coronary artery disease include diabetes mellitus, dyslipidemia, family history, male gender, obesity, sedentary lifestyle and smoking/tobacco exposure. Current antihypertension treatment includes direct vasodilators. The current treatment provides moderate improvement. There are no compliance problems.    Hyperlipidemia   This is a chronic problem. The current episode started more than 1 year ago. " Recent lipid tests were reviewed and are variable. Exacerbating diseases include diabetes. Factors aggravating his hyperlipidemia include fatty foods. Pertinent negatives include no chest pain or shortness of breath. Current antihyperlipidemic treatment includes statins. The current treatment provides mild improvement of lipids. There are no compliance problems.  Risk factors for coronary artery disease include diabetes mellitus and hypertension.   Knee Pain    The incident occurred more than 1 week ago. The incident occurred at home. There was no injury mechanism. The pain is present in the left knee. The quality of the pain is described as aching and shooting. The pain is at a severity of 5/10. The pain is mild. The pain has been fluctuating since onset. Pertinent negatives include no inability to bear weight. The symptoms are aggravated by weight bearing. He has tried rest for the symptoms. The treatment provided mild relief.        The following portions of the patient's history were reviewed and updated as appropriate: allergies, current medications, past family history, past medical history, past social history, past surgical history and problem list.    Review of Systems   Constitutional: Negative.  Negative for chills, diaphoresis, fatigue and fever.   HENT: Negative.    Eyes: Negative.    Respiratory: Negative.  Negative for shortness of breath.    Cardiovascular: Negative.  Negative for chest pain and palpitations.   Gastrointestinal: Negative.    Endocrine: Negative for polydipsia, polyphagia and polyuria.   Genitourinary:        Frequency    Musculoskeletal: Positive for arthralgias.   Skin: Negative.    Neurological: Negative.  Negative for headaches.   Psychiatric/Behavioral: Negative.  Negative for confusion. The patient is not nervous/anxious.        Objective   Physical Exam   Constitutional: He is oriented to person, place, and time. He appears well-developed and well-nourished.   HENT:   Head:  Normocephalic.   Right Ear: External ear normal.   Left Ear: External ear normal.   Eyes: Pupils are equal, round, and reactive to light. EOM are normal.   Neck: Normal range of motion. Neck supple.   Cardiovascular: Normal rate, regular rhythm and normal heart sounds.    Pulmonary/Chest: Effort normal and breath sounds normal.   Abdominal: Soft. Bowel sounds are normal.   Musculoskeletal: Normal range of motion.    Hung had a diabetic foot exam performed today.   During the foot exam he had a monofilament test performed (10/10 on left , 9/10 on right).     Neurological: He is alert and oriented to person, place, and time.   Skin: Skin is warm. Capillary refill takes less than 2 seconds.        Psychiatric: He has a normal mood and affect. His behavior is normal.   Nursing note and vitals reviewed.      Assessment/Plan   Problems Addressed this Visit        Cardiovascular and Mediastinum    Essential hypertension - Primary    Relevant Orders    CBC & Differential (Completed)    Comprehensive Metabolic Panel (Completed)    CBC Auto Differential (Completed)    Mixed hyperlipidemia    Relevant Orders    Lipid panel (Completed)       Endocrine    Diabetes (CMS/[facility])    Relevant Orders    Hemoglobin A1c (Completed)      Other Visit Diagnoses     Urinary frequency        Relevant Orders    Urinalysis With Culture If Indicated - Urine, Clean Catch (Completed)    PSA Screen (Completed)    Acute pain of left knee        Relevant Medications    triamcinolone acetonide (KENALOG-40) injection 60 mg (Completed)    Other Relevant Orders    XR knee 4+ vw left    Encounter for screening for malignant neoplasm of prostate         Relevant Orders    PSA Screen (Completed)    Actinic keratosis            1. Essential hypertension:  Complete CBC & Differential   Comprehensive Metabolic Panel   Encouraged to adhere to a healthy diet    2. Mixed hyperlipidemia:  Complete lipid panel as ordered  Instructed to adhere to a healthy  diet    3.Diabetes:  Complete Ha1C as ordered   Instructed to adhere to an ADA diet   Instructed to complete a foot exam at least weekly    4.Urinary frequency:   Complete urinalysis with culture as ordered   Complete PSA screen as ordered    5.Acute pain of left knee:   Complete XR of knee as ordered     6.Encounter for screening for malignant neoplasm of prostate:  Complete PSA screen as ordered     7.Actinic Keratosis:  Cryo therapy for destruction of lesion  Encourage use of sunscreen   Encouraged to wear a hat when outdoors    Continue on current medications as previously prescribed   Schedule follow up in 3 months for annual medicare wellness exam or sooner if needed         This document has been electronically signed by GEMINI Red on September 20, 2018 11:50 AM

## 2018-09-20 NOTE — PROGRESS NOTES
Per GEMINI Núñez, Mr. Ramsay has been called with his recent lab results & recommendations.  Continue his current medications and follow-up as planned or sooner if any problems.

## 2018-09-20 NOTE — TELEPHONE ENCOUNTER
Per GEMINI Núñez, Mr. Ramsay has been called with his recent lab results & recommendations.  Continue his current medications and follow-up as planned or sooner if any problems.      ----- Message from GEMINI Red sent at 9/20/2018  1:19 PM CDT -----  Please call and let him know that his blood work came back his total cholesterol was good his triglycerides are still elevated at 207.  If he is not already taking an over-the-counter Fish oil he needs to begin one according to the package directions.  Also his sugars are not good.  Glucose today was 235.  His hemoglobin A1c has went up from 6.7 to 8.1.  I'm going to increase his glipizide to 5 mg twice a day.  That means instead of a half a tablet twice a day he now needs to take one whole tablet twice a day.  Needs to stop eating sweets and watch his carbohydrates.  We will recheck his hemoglobin A1c at next visit.  Thank you!

## 2018-09-21 ENCOUNTER — TELEPHONE (OUTPATIENT)
Dept: FAMILY MEDICINE CLINIC | Facility: CLINIC | Age: 68
End: 2018-09-21

## 2018-09-21 NOTE — TELEPHONE ENCOUNTER
Per GEMINI Núñez, Mr. Ramsay has been called with his recent Knee x-ray results & recommendations.  Continue his current medications and follow-up as planned or sooner if any problems.    He does not want to go to Ortho at this time      ----- Message from GEMINI Red sent at 9/20/2018  4:51 PM CDT -----  Xray showed no fracture but degenerative changes. Can refer to ortho if he would like

## 2018-09-21 NOTE — PROGRESS NOTES
Per GEMINI Núñez, Mr. Rmasay has been called with his recent Knee x-ray results & recommendations.  Continue his current medications and follow-up as planned or sooner if any problems.    He does not want to go to Ortho at this time

## 2018-10-30 ENCOUNTER — TELEPHONE (OUTPATIENT)
Dept: FAMILY MEDICINE CLINIC | Facility: CLINIC | Age: 68
End: 2018-10-30

## 2018-10-30 DIAGNOSIS — F41.9 ANXIETY: ICD-10-CM

## 2018-10-30 RX ORDER — PAROXETINE HYDROCHLORIDE HEMIHYDRATE 25 MG/1
25 TABLET, FILM COATED, EXTENDED RELEASE ORAL EVERY MORNING
Qty: 90 TABLET | Refills: 1 | Status: SHIPPED | OUTPATIENT
Start: 2018-10-30 | End: 2019-04-28 | Stop reason: SDUPTHER

## 2018-11-12 RX ORDER — OMEPRAZOLE 40 MG/1
40 CAPSULE, DELAYED RELEASE ORAL DAILY
Qty: 90 CAPSULE | Refills: 2 | Status: SHIPPED | OUTPATIENT
Start: 2018-11-12 | End: 2019-06-20 | Stop reason: SDUPTHER

## 2018-12-02 ENCOUNTER — APPOINTMENT (OUTPATIENT)
Dept: GENERAL RADIOLOGY | Facility: HOSPITAL | Age: 68
End: 2018-12-02

## 2018-12-02 ENCOUNTER — HOSPITAL ENCOUNTER (EMERGENCY)
Facility: HOSPITAL | Age: 68
Discharge: HOME | End: 2018-12-02
Attending: EMERGENCY MEDICINE | Admitting: EMERGENCY MEDICINE

## 2018-12-02 VITALS
WEIGHT: 210 LBS | HEART RATE: 76 BPM | HEIGHT: 68 IN | OXYGEN SATURATION: 99 % | DIASTOLIC BLOOD PRESSURE: 95 MMHG | BODY MASS INDEX: 31.83 KG/M2 | RESPIRATION RATE: 20 BRPM | TEMPERATURE: 97.5 F | SYSTOLIC BLOOD PRESSURE: 181 MMHG

## 2018-12-02 DIAGNOSIS — S51.012A LACERATION OF LEFT ELBOW, INITIAL ENCOUNTER: Primary | ICD-10-CM

## 2018-12-02 DIAGNOSIS — M25.729 OLECRANON BONE SPUR: ICD-10-CM

## 2018-12-02 PROCEDURE — 25010000002 TDAP 5-2.5-18.5 LF-MCG/0.5 SUSPENSION: Performed by: EMERGENCY MEDICINE

## 2018-12-02 PROCEDURE — 90471 IMMUNIZATION ADMIN: CPT | Performed by: EMERGENCY MEDICINE

## 2018-12-02 PROCEDURE — 90715 TDAP VACCINE 7 YRS/> IM: CPT | Performed by: EMERGENCY MEDICINE

## 2018-12-02 PROCEDURE — 99283 EMERGENCY DEPT VISIT LOW MDM: CPT

## 2018-12-02 PROCEDURE — 96372 THER/PROPH/DIAG INJ SC/IM: CPT

## 2018-12-02 PROCEDURE — 73080 X-RAY EXAM OF ELBOW: CPT

## 2018-12-02 PROCEDURE — 25010000003 AMPICILLIN-SULBACTAM PER 1.5 G: Performed by: EMERGENCY MEDICINE

## 2018-12-02 RX ORDER — LIDOCAINE HYDROCHLORIDE 10 MG/ML
INJECTION, SOLUTION EPIDURAL; INFILTRATION; INTRACAUDAL; PERINEURAL
Status: COMPLETED
Start: 2018-12-02 | End: 2018-12-02

## 2018-12-02 RX ORDER — DIAPER,BRIEF,INFANT-TODD,DISP
EACH MISCELLANEOUS ONCE
Status: COMPLETED | OUTPATIENT
Start: 2018-12-02 | End: 2018-12-02

## 2018-12-02 RX ORDER — AMPICILLIN AND SULBACTAM 1; .5 G/1; G/1
1.5 INJECTION, POWDER, FOR SOLUTION INTRAMUSCULAR; INTRAVENOUS ONCE
Status: COMPLETED | OUTPATIENT
Start: 2018-12-02 | End: 2018-12-02

## 2018-12-02 RX ORDER — AMOXICILLIN AND CLAVULANATE POTASSIUM 875; 125 MG/1; MG/1
1 TABLET, FILM COATED ORAL 2 TIMES DAILY
Qty: 14 TABLET | Refills: 0 | Status: SHIPPED | OUTPATIENT
Start: 2018-12-02 | End: 2018-12-20

## 2018-12-02 RX ORDER — LIDOCAINE HYDROCHLORIDE 10 MG/ML
5 INJECTION, SOLUTION EPIDURAL; INFILTRATION; INTRACAUDAL; PERINEURAL ONCE
Status: COMPLETED | OUTPATIENT
Start: 2018-12-02 | End: 2018-12-02

## 2018-12-02 RX ADMIN — Medication: at 18:40

## 2018-12-02 RX ADMIN — LIDOCAINE HYDROCHLORIDE 5 ML: 10 INJECTION, SOLUTION EPIDURAL; INFILTRATION; INTRACAUDAL; PERINEURAL at 18:09

## 2018-12-02 RX ADMIN — TETANUS TOXOID, REDUCED DIPHTHERIA TOXOID AND ACELLULAR PERTUSSIS VACCINE, ADSORBED 0.5 ML: 5; 2.5; 8; 8; 2.5 SUSPENSION INTRAMUSCULAR at 18:21

## 2018-12-02 RX ADMIN — AMPICILLIN SODIUM AND SULBACTAM SODIUM 1.5 G: 1; .5 INJECTION, POWDER, FOR SOLUTION INTRAMUSCULAR; INTRAVENOUS at 18:25

## 2018-12-02 RX ADMIN — LIDOCAINE HYDROCHLORIDE 5 ML: 10 INJECTION, SOLUTION EPIDURAL; INFILTRATION; INTRACAUDAL at 18:09

## 2018-12-02 NOTE — ED NOTES
Patient tripped and fell hitting left elbow, noted laceration about 3.5 cm     Marita Duron, LPN  12/02/18 1192

## 2018-12-02 NOTE — ED PROVIDER NOTES
Subjective   67 years old presented in the ER with left elbow laceration.  He slipped on wet stairs and hit his left elbow against it.  Did not hit his head.  No loss of consciousness.  There was bleeding initially which stopped after applying pressure.  He is not complaining of any pain in the elbow.  He is able to move his elbow in all directions without any limitation.  No numbness tingling or weakness of left hand or wrist.  No injury anywhere else.        History provided by:  Patient      Review of Systems   Constitutional: Negative for chills and fever.   HENT: Negative for congestion, sinus pressure and sore throat.    Eyes: Negative for pain.   Respiratory: Negative for chest tightness and shortness of breath.    Cardiovascular: Negative for chest pain.   Gastrointestinal: Negative for abdominal pain.   Genitourinary: Negative for flank pain.   Musculoskeletal: Positive for joint swelling. Negative for back pain and gait problem.   Skin: Positive for wound.   Neurological: Negative for syncope and headaches.   Psychiatric/Behavioral: Negative for agitation.       Past Medical History:   Diagnosis Date   • Anxiety    • Diabetes (CMS/HCC)    • Essential hypertension    • GERD (gastroesophageal reflux disease)    • Insomnia        No Known Allergies    Past Surgical History:   Procedure Laterality Date   • COLECTOMY PARTIAL / TOTAL     • ENDOSCOPY AND COLONOSCOPY  04/19/2012       Family History   Problem Relation Age of Onset   • Diabetes Mother    • Hypertension Mother    • Heart disease Mother    • Cancer Father        Social History     Socioeconomic History   • Marital status:      Spouse name: Not on file   • Number of children: Not on file   • Years of education: Not on file   • Highest education level: Not on file   Tobacco Use   • Smoking status: Former Smoker   • Smokeless tobacco: Former User   Substance and Sexual Activity   • Alcohol use: No   • Drug use: No   • Sexual activity: Defer            Objective   Physical Exam   Constitutional: He is oriented to person, place, and time. He appears well-developed and well-nourished.   HENT:   Head: Normocephalic and atraumatic.   Nose: Nose normal.   Mouth/Throat: Oropharynx is clear and moist.   Eyes: Conjunctivae are normal.   Neck: Normal range of motion. Neck supple.   Cardiovascular: Normal rate, regular rhythm and normal heart sounds.   Pulmonary/Chest: Effort normal and breath sounds normal.   Musculoskeletal: He exhibits no tenderness.        Left elbow: He exhibits laceration. He exhibits normal range of motion, no swelling, no effusion and no deformity. No tenderness found.        Arms:  Neurological: He is alert and oriented to person, place, and time.   Skin: Skin is warm. Capillary refill takes less than 2 seconds.   Psychiatric: He has a normal mood and affect.   Nursing note and vitals reviewed.      Laceration Repair  Date/Time: 12/2/2018 6:15 PM  Performed by: Fede Hussein MD  Authorized by: Fede Hussein MD     Consent:     Consent obtained:  Verbal    Consent given by:  Patient    Risks discussed:  Infection, pain, tendon damage, need for additional repair, nerve damage, poor wound healing and vascular damage  Anesthesia (see MAR for exact dosages):     Anesthesia method:  Local infiltration    Local anesthetic:  Lidocaine 1% w/o epi  Laceration details:     Location:  Shoulder/arm    Shoulder/arm location:  L elbow    Length (cm):  5  Repair type:     Repair type:  Simple  Pre-procedure details:     Preparation:  Patient was prepped and draped in usual sterile fashion  Treatment:     Area cleansed with:  Hibiclens and saline    Amount of cleaning:  Standard  Skin repair:     Repair method:  Sutures    Suture size:  4-0    Suture technique:  Simple interrupted    Number of sutures:  7  Approximation:     Approximation:  Close    Vermilion border: well-aligned    Post-procedure details:     Dressing:  Antibiotic ointment and bulky  dressing    Patient tolerance of procedure:  Tolerated well, no immediate complications               ED Course                  MDM  Number of Diagnoses or Management Options  Diagnosis management comments: 67 years old is evaluated for left elbow laceration.  No injury anywhere else.  Has a small palpable piece of bone in the laceration area.  I have obtained an x-ray which showed small fracture olecranon spur but no other fracture dislocation.  He is given a dose of antibiotic in here we'll continue with antibiotics to go home.  Updated tetanus.  Laceration is repaired.  Follow-up outpatient with primary care and orthopedics for reevaluation.       Amount and/or Complexity of Data Reviewed  Tests in the radiology section of CPT®: ordered and reviewed      Labs Reviewed - No data to display    Xr Elbow 3+ View Left    Result Date: 12/2/2018  Narrative: Three view left elbow HISTORY: Fell. Laceration. AP, lateral and oblique views obtained. COMPARISON: None Soft tissue laceration dorsal to the olecranon process of the proximal ulna. Fractured olecranon spur. Bilateral 9 mm and smaller oval ossifications inferior to each humeral epicondyle, possibly old avulsions or ossifications in the collateral ligaments. No joint effusion. No dislocation. No other osseous or articular abnormality.     Impression: CONCLUSION: Soft tissue laceration dorsal to the olecranon process of the proximal ulna. Fractured olecranon spur. Bilateral 9 mm and smaller oval ossifications inferior to each humeral epicondyle, possibly old avulsions or ossifications in the collateral ligaments. [id number] Electronically signed by:  Estuardo Franks MD  12/2/2018 4:56 PM CST Workstation: [id number]          Final diagnoses:   Laceration of left elbow, initial encounter   Olecranon bone spur            Fede Hussein MD  12/04/18 5125

## 2018-12-05 ENCOUNTER — OFFICE VISIT (OUTPATIENT)
Dept: FAMILY MEDICINE CLINIC | Facility: CLINIC | Age: 68
End: 2018-12-05

## 2018-12-05 VITALS
SYSTOLIC BLOOD PRESSURE: 154 MMHG | WEIGHT: 214.7 LBS | DIASTOLIC BLOOD PRESSURE: 84 MMHG | HEIGHT: 68 IN | BODY MASS INDEX: 32.54 KG/M2

## 2018-12-05 DIAGNOSIS — S59.902D ELBOW INJURY, LEFT, SUBSEQUENT ENCOUNTER: Primary | ICD-10-CM

## 2018-12-05 DIAGNOSIS — M25.552 LEFT HIP PAIN: ICD-10-CM

## 2018-12-05 PROCEDURE — 96372 THER/PROPH/DIAG INJ SC/IM: CPT | Performed by: NURSE PRACTITIONER

## 2018-12-05 PROCEDURE — 99213 OFFICE O/P EST LOW 20 MIN: CPT | Performed by: NURSE PRACTITIONER

## 2018-12-05 RX ORDER — TRIAMCINOLONE ACETONIDE 40 MG/ML
80 INJECTION, SUSPENSION INTRA-ARTICULAR; INTRAMUSCULAR ONCE
Status: COMPLETED | OUTPATIENT
Start: 2018-12-05 | End: 2018-12-05

## 2018-12-05 RX ADMIN — TRIAMCINOLONE ACETONIDE 80 MG: 40 INJECTION, SUSPENSION INTRA-ARTICULAR; INTRAMUSCULAR at 08:50

## 2018-12-05 NOTE — PROGRESS NOTES
"Subjective   Hung Ramsay is a 68 y.o. male.  ER follow-up.  On 12/02/2018 fell down five steps at his home.  \"My son had cleaned the steps and I think it had Pledge on it and I was in my socks and I just went down.\"  Denies loss of consciousness and reports he only struck his left hip and elbow on the stairs.  Was seen at the emergency department had sutures placed to the laceration on his left elbow.  Blood pressure has been running high if he reports his wife recently passed away unexpectedly about 3 weeks ago from complications due to pneumonia    Elbow Injury   This is a new problem. The current episode started in the past 7 days. The problem occurs constantly. The problem has been gradually improving. Associated symptoms include arthralgias. Nothing aggravates the symptoms. He has tried acetaminophen (Sutures ) for the symptoms. The treatment provided significant relief.   Hip Injury   This is a new problem. The current episode started in the past 7 days. The problem occurs constantly. The problem has been gradually worsening. Associated symptoms include arthralgias. The symptoms are aggravated by walking. He has tried acetaminophen for the symptoms. The treatment provided mild relief.        The following portions of the patient's history were reviewed and updated as appropriate: allergies, current medications, past family history, past medical history, past social history, past surgical history and problem list.    Review of Systems   Constitutional: Negative.    Respiratory: Negative.    Cardiovascular: Negative.    Gastrointestinal: Negative.    Musculoskeletal: Positive for arthralgias.   Skin: Positive for wound.   Neurological: Negative.    Psychiatric/Behavioral: Negative.        Objective   Physical Exam   Musculoskeletal:        Arms:       Legs:  Psychiatric: He exhibits a depressed mood. He expresses no suicidal ideation. He expresses no suicidal plans.       Assessment/Plan   Problems " Addressed this Visit     None      Visit Diagnoses     Elbow injury, left, subsequent encounter    -  Primary    Relevant Orders    Ambulatory Referral to Orthopedic Surgery    Left hip pain        Relevant Medications    triamcinolone acetonide (KENALOG-40) injection 80 mg    Other Relevant Orders    XR Hip With or Without Pelvis 2 - 3 View Left        1.  Elbow injury, left, subsequent encounter:  Referral placed to Dr. Bello with orthopedics and will call to schedule appointment  Sutures remain in place  Educated on cleansing sutures with antibacterial soap and water and patting dry  Educated to watch for signs of infection such as increased pain, redness, swelling or drainage and encouraged to seek emergency medical treatment should this occur   Sutures to be removed in 10-14 days from initial placement  May continue the use of acetaminophen OTC according to package directions as needed for pain    2.  Left hip pain:  Kenalog 80 mg IM given in office  Educated on possible side effects of steroidal medications including but not limited to increased risk for osteoporosis and elevated glucose levels  Complete x-ray of the left hip as ordered and will notify of results when available    Continue on current medications as previously prescribed   Keep already scheduled upcoming appointment on December 20, 2018 for follow-up or may be seen sooner as needed        This document has been electronically signed by GEMINI Red on December 5, 2018 8:43 AM

## 2018-12-07 ENCOUNTER — OFFICE VISIT (OUTPATIENT)
Dept: ORTHOPEDIC SURGERY | Facility: CLINIC | Age: 68
End: 2018-12-07

## 2018-12-07 VITALS — WEIGHT: 213.2 LBS | HEIGHT: 68 IN | BODY MASS INDEX: 32.31 KG/M2

## 2018-12-07 DIAGNOSIS — S51.012A LACERATION OF LEFT ELBOW, INITIAL ENCOUNTER: ICD-10-CM

## 2018-12-07 DIAGNOSIS — M25.522 LEFT ELBOW PAIN: Primary | ICD-10-CM
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
## 2018-12-07 PROCEDURE — 99214 OFFICE O/P EST MOD 30 MIN: CPT | Performed by: NURSE PRACTITIONER

## 2018-12-07 NOTE — PROGRESS NOTES
Hung Ramsay is a 68 y.o. male   Primary provider:  Elvira Ramsay APRN       Chief Complaint   Patient presents with   • Left Elbow - Pain   • Establish Care       HISTORY OF PRESENT ILLNESS:      68-year-old male patient presents to office for evaluation of left elbow injury.  Initial injury occurred on 12/2/2018 when he slipped on some steps and fell, striking his left elbow.  He sustained a laceration to the left elbow.  Patient was evaluated in the ED on the date of injury with repair of his laceration with sutures and x-rays done.  Patient was prescribed Augmentin and given an updated tetanus vaccine.  Patient has also followed up with his primary care provider, GEMINI Núñez on 12/5/2018.  Sutures remain in place to the left elbow.  Patient is doing well with no unusual complaints or concerns noted.  Pain is described as intermittent and mild in severity.  Pain is described as grinding in nature area no associated redness, swelling or bruising noted.  Nothing worsens the pain.  Rest improves the pain.      Pain   This is a new problem. The current episode started in the past 7 days (12/2/2018). The problem occurs intermittently. The problem has been gradually improving. Associated symptoms include arthralgias and joint swelling. Associated symptoms comments: grinding. Nothing aggravates the symptoms. He has tried rest for the symptoms. The treatment provided mild relief.        CONCURRENT MEDICAL HISTORY:    Past Medical History:   Diagnosis Date   • Anxiety    • Diabetes (CMS/HCC)    • Essential hypertension    • GERD (gastroesophageal reflux disease)    • Insomnia        No Known Allergies      Current Outpatient Medications:   •  amoxicillin-clavulanate (AUGMENTIN) 875-125 MG per tablet, Take 1 tablet by mouth 2 (Two) Times a Day., Disp: 14 tablet, Rfl: 0  •  aspirin 81 MG EC tablet, Take 81 mg by mouth Daily., Disp: , Rfl:   •  glipiZIDE (GLUCOTROL) 5 MG tablet, Take 1 tablet by mouth 2 (Two)  Times a Day Before Meals., Disp: 60 tablet, Rfl: 3  •  hydrALAZINE (APRESOLINE) 25 MG tablet, Take 1 tablet by mouth 3 (Three) Times a Day., Disp: 90 tablet, Rfl: 5  •  losartan-hydrochlorothiazide (HYZAAR) 100-25 MG per tablet, Take 1 tablet by mouth Daily., Disp: 90 tablet, Rfl: 1  •  metFORMIN (GLUCOPHAGE) 1000 MG tablet, Take 1 tablet by mouth 2 (Two) Times a Day With Meals., Disp: 180 tablet, Rfl: 1  •  omeprazole (priLOSEC) 40 MG capsule, Take 1 capsule by mouth Daily., Disp: 90 capsule, Rfl: 2  •  PARoxetine CR (PAXIL-CR) 25 MG 24 hr tablet, Take 1 tablet by mouth Every Morning., Disp: 90 tablet, Rfl: 1  •  pravastatin (PRAVACHOL) 40 MG tablet, Take 1 tablet by mouth Every Night., Disp: 90 tablet, Rfl: 1  •  traZODone (DESYREL) 50 MG tablet, Take 1 tablet by mouth Every Night., Disp: 90 tablet, Rfl: 1    Past Surgical History:   Procedure Laterality Date   • COLECTOMY PARTIAL / TOTAL     • ENDOSCOPY AND COLONOSCOPY  04/19/2012       Family History   Problem Relation Age of Onset   • Diabetes Mother    • Hypertension Mother    • Heart disease Mother    • Cancer Father         Social History     Socioeconomic History   • Marital status:      Spouse name: Not on file   • Number of children: Not on file   • Years of education: Not on file   • Highest education level: Not on file   Social Needs   • Financial resource strain: Not on file   • Food insecurity - worry: Not on file   • Food insecurity - inability: Not on file   • Transportation needs - medical: Not on file   • Transportation needs - non-medical: Not on file   Occupational History   • Not on file   Tobacco Use   • Smoking status: Former Smoker   • Smokeless tobacco: Former User   Substance and Sexual Activity   • Alcohol use: No   • Drug use: No   • Sexual activity: Defer   Other Topics Concern   • Not on file   Social History Narrative   • Not on file        Review of Systems   Constitutional: Negative.    HENT: Negative.    Eyes: Positive for  "visual disturbance.   Respiratory: Negative.    Cardiovascular: Negative.    Gastrointestinal: Positive for diarrhea.   Endocrine: Negative.    Genitourinary: Negative.    Musculoskeletal: Positive for arthralgias and joint swelling.        Left elbow pain.    Skin: Positive for wound (Left elbow).   Allergic/Immunologic: Negative.    Neurological: Negative.    Hematological: Negative.    Psychiatric/Behavioral: Negative.        PHYSICAL EXAMINATION:       Ht 172.7 cm (68\")   Wt 96.7 kg (213 lb 3.2 oz)   BMI 32.42 kg/m²     Physical Exam   Constitutional: He is oriented to person, place, and time. Vital signs are normal. He appears well-developed and well-nourished. He is active and cooperative. He does not appear ill. No distress.   HENT:   Head: Normocephalic.   Pulmonary/Chest: Effort normal. No respiratory distress.   Abdominal: Soft. He exhibits no distension.   Musculoskeletal: He exhibits edema (Mild, left elbow). He exhibits no tenderness or deformity.   Neurological: He is alert and oriented to person, place, and time. GCS eye subscore is 4. GCS verbal subscore is 5. GCS motor subscore is 6.   Skin: Skin is warm, dry and intact. Capillary refill takes less than 2 seconds. No erythema.   Psychiatric: He has a normal mood and affect. His speech is normal and behavior is normal. Judgment and thought content normal. Cognition and memory are normal.   Vitals reviewed.      GAIT:     [x]  Normal  []  Antalgic    Assistive device: [x]  None  []  Walker     []  Crutches  []  Cane     []  Wheelchair  []  Stretcher    Right Elbow Exam     Tenderness   The patient is experiencing no tenderness.     Range of Motion   The patient has normal right elbow ROM.    Muscle Strength   The patient has normal right elbow strength.    Other   Erythema: absent  Sensation: normal  Pulse: present      Left Elbow Exam     Tenderness   The patient is experiencing no tenderness.     Range of Motion   The patient has normal left elbow " ROM.    Muscle Strength   The patient has normal left elbow strength.    Other   Erythema: absent  Sensation: normal  Pulse: present    Comments:  Sutures in place to posterior elbow.  Wound is well approximated with no erythema and no drainage.  No signs of infection noted.  Mild/minimal generalized swelling noted.  No pain or difficulty with full range of motion.  Able joint exam.            Xr Elbow 3+ View Left    Result Date: 12/2/2018  Narrative: Three view left elbow HISTORY: Fell. Laceration. AP, lateral and oblique views obtained. COMPARISON: None Soft tissue laceration dorsal to the olecranon process of the proximal ulna. Fractured olecranon spur. Bilateral 9 mm and smaller oval ossifications inferior to each humeral epicondyle, possibly old avulsions or ossifications in the collateral ligaments. No joint effusion. No dislocation. No other osseous or articular abnormality.     Impression: CONCLUSION: Soft tissue laceration dorsal to the olecranon process of the proximal ulna. Fractured olecranon spur. Bilateral 9 mm and smaller oval ossifications inferior to each humeral epicondyle, possibly old avulsions or ossifications in the collateral ligaments. [id number] Electronically signed by:  Estuardo Franks MD  12/2/2018 4:56 PM [employer] Workstation: [employer]    Xr Hip With Or Without Pelvis 2 - 3 View Left    Result Date: 12/5/2018  Narrative: PROCEDURE: XR HIP W OR WO PELVIS 2-3 VIEW LEFT Clinical History: left elbow pain, abnormal X-ray, M25.552 Pain in left hip Indication: Same as above Comparison: None . Technique: Single frontal view of the pelvis and two  views of the left  hip joint were done. Findings: There is no evidence of acute fractures or dislocations involving the bones of the pelvis including the the left  hip joint and the visualized adjacent proximal femur. There is minimal reduction in the superior lateral left hip joint space suggestive of underlying minimal degenerative change. Mild  degenerative change is also seen in the region of the left greater trochanter The soft tissues are radiographically unremarkable. There is no visualization of any radiopaque foreign bodies in the soft tissues..     Impression: Impression:  There is minimal reduction in the superior lateral left hip joint space suggestive of underlying minimal degenerative change. Mild degenerative change is also seen in the region of the left greater trochanter Electronically signed by:  Car Joshua MD  12/5/2018 4:32 PM [facility] Workstation: [email or web address]    ASSESSMENT:    Diagnoses and all orders for this visit:    Left elbow pain    Fall with injury, initial encounter    Laceration of left elbow, initial encounter    PLAN    X-rays of left elbow done in the ED on 12/2/2018 are reviewed today. Patient has a fractured olecranon spur noted on x-ray, likely due to his recent fall.  He has no weakness or limitations in range of motion of the elbow on exam today.  He sustained a laceration to the left elbow when he fell. The laceration was repaired in the ED with sutures.  The sutures remain in place and the wound looks very good.  There is no erythema or signs of infection.  The wound is well approximated and is healing as expected.  Patient continues with his current course of Augmentin as prescribed.  Patient is instructed to continue this antibiotic and to finish the entire course.  Continue with current wound care and daily cleansing of the wound with soap and water.  Continue to monitor the incision for any signs of infection including increased redness, increased swelling, increased tenderness, increased warmth and/or purulent drainage.  Patient is instructed to notify the office immediately for any signs of infection.  Recommend Tylenol or Ibuprofen as needed for pain.  Patient reports only mild, intermittent pain that continues to gradually improve.  Follow-up in one week for recheck and suture removal.  Follow up sooner as  needed for any new or worsening symptoms or any concerns.    Return in about 1 week (around 12/14/2018) for Recheck.        This document has been electronically signed by GEMINI Escobedo on December 7, 2018 3:54 PM      GEMINI Escobedo

## 2018-12-12 ENCOUNTER — TELEPHONE (OUTPATIENT)
Dept: FAMILY MEDICINE CLINIC | Facility: CLINIC | Age: 68
End: 2018-12-12

## 2018-12-17 DIAGNOSIS — E11.9 TYPE 2 DIABETES MELLITUS WITHOUT COMPLICATION, WITHOUT LONG-TERM CURRENT USE OF INSULIN (HCC): ICD-10-CM

## 2018-12-17 RX ORDER — GLIPIZIDE 5 MG/1
5 TABLET ORAL
Qty: 60 TABLET | Refills: 3 | Status: SHIPPED | OUTPATIENT
Start: 2018-12-17 | End: 2019-06-01 | Stop reason: SDUPTHER

## 2018-12-20 ENCOUNTER — OFFICE VISIT (OUTPATIENT)
Dept: FAMILY MEDICINE CLINIC | Facility: CLINIC | Age: 68
End: 2018-12-20

## 2018-12-20 VITALS
HEIGHT: 68 IN | BODY MASS INDEX: 32.61 KG/M2 | DIASTOLIC BLOOD PRESSURE: 86 MMHG | SYSTOLIC BLOOD PRESSURE: 162 MMHG | WEIGHT: 215.2 LBS

## 2018-12-20 DIAGNOSIS — Z00.00 MEDICARE ANNUAL WELLNESS VISIT, INITIAL: Primary | ICD-10-CM

## 2018-12-20 PROCEDURE — G0438 PPPS, INITIAL VISIT: HCPCS | Performed by: NURSE PRACTITIONER

## 2018-12-20 NOTE — PROGRESS NOTES
QUICK REFERENCE INFORMATION:  The ABCs of the Annual Wellness Visit    Initial Medicare Wellness Visit    HEALTH RISK ASSESSMENT    1950    Recent Hospitalizations:  No hospitalization(s) within the last year..        Current Medical Providers:  Patient Care Team:  Elvira Ramsay APRN as PCP - General (Nurse Practitioner)  Andrew Acuña MD as PCP - Claims Attributed  Dontae Cha DPM as Consulting Physician (Podiatry)        Smoking Status:  Social History     Tobacco Use   Smoking Status Former Smoker   Smokeless Tobacco Former User       Alcohol Consumption:  Social History     Substance and Sexual Activity   Alcohol Use No       Depression Screen:   PHQ-2/PHQ-9 Depression Screening 12/20/2018   Little interest or pleasure in doing things 1   Feeling down, depressed, or hopeless 1   Trouble falling or staying asleep, or sleeping too much 0   Feeling tired or having little energy 1   Poor appetite or overeating 0   Feeling bad about yourself - or that you are a failure or have let yourself or your family down 0   Trouble concentrating on things, such as reading the newspaper or watching television 0   Moving or speaking so slowly that other people could have noticed. Or the opposite - being so fidgety or restless that you have been moving around a lot more than usual 0   Thoughts that you would be better off dead, or of hurting yourself in some way 0   Total Score 3   If you checked off any problems, how difficult have these problems made it for you to do your work, take care of things at home, or get along with other people? Somewhat difficult       Health Habits and Functional and Cognitive Screening:  Functional & Cognitive Status 12/20/2018   Do you have difficulty preparing food and eating? No   Do you have difficulty bathing yourself, getting dressed or grooming yourself? No   Do you have difficulty using the toilet? No   Do you have difficulty moving around from place to place? No   Do you  have trouble with steps or getting out of a bed or a chair? No   In the past year have you fallen or experienced a near fall? Yes   Current Diet Well Balanced Diet   Dental Exam Up to date   Eye Exam Up to date   Exercise (times per week) 3 times per week   Current Exercise Activities Include Walking   Do you need help using the phone?  No   Are you deaf or do you have serious difficulty hearing?  No   Do you need help with transportation? No   Do you need help shopping? No   Do you need help preparing meals?  No   Do you need help with housework?  No   Do you need help with laundry? No   Do you need help taking your medications? No   Do you need help managing money? No   Do you ever drive or ride in a car without wearing a seat belt? No   Have you felt unusual stress, anger or loneliness in the last month? Yes   Who do you live with? Child   If you need help, do you have trouble finding someone available to you? No   Have you been bothered in the last four weeks by sexual problems? No   Do you have difficulty concentrating, remembering or making decisions? No           Does the patient have evidence of cognitive impairment? No    Asiprin use counseling: Taking ASA appropriately as indicated      Recent Lab Results:    Visual Acuity:  No exam data present    Age-appropriate Screening Schedule:  Refer to the list below for future screening recommendations based on patient's age, sex and/or medical conditions. Orders for these recommended tests are listed in the plan section. The patient has been provided with a written plan.    Health Maintenance   Topic Date Due   • ZOSTER VACCINE (2 of 2) 08/28/2017   • DIABETIC EYE EXAM  07/03/2018   • INFLUENZA VACCINE  08/01/2018   • PNEUMOCOCCAL VACCINES (65+ LOW/MEDIUM RISK) (2 of 2 - PPSV23) 10/07/2018   • HEMOGLOBIN A1C  03/20/2019   • URINE MICROALBUMIN  04/03/2019   • DIABETIC FOOT EXAM  09/20/2019   • LIPID PANEL  09/20/2019   • COLONOSCOPY  04/19/2022   • TDAP/TD  VACCINES (3 - Td) 12/02/2028        Subjective      History of Present Illness    Hung Ramsay is a 68 y.o. male who presents for an Annual Wellness Visit.    The following portions of the patient's history were reviewed and updated as appropriate: allergies, current medications, past family history, past medical history, past social history, past surgical history and problem list.    Outpatient Medications Prior to Visit   Medication Sig Dispense Refill   • aspirin 81 MG EC tablet Take 81 mg by mouth Daily.     • glipiZIDE (GLUCOTROL) 5 MG tablet Take 1 tablet by mouth 2 (Two) Times a Day Before Meals. 60 tablet 3   • hydrALAZINE (APRESOLINE) 25 MG tablet Take 1 tablet by mouth 3 (Three) Times a Day. 90 tablet 5   • losartan-hydrochlorothiazide (HYZAAR) 100-25 MG per tablet Take 1 tablet by mouth Daily. 90 tablet 1   • metFORMIN (GLUCOPHAGE) 1000 MG tablet Take 1 tablet by mouth 2 (Two) Times a Day With Meals. 180 tablet 1   • omeprazole (priLOSEC) 40 MG capsule Take 1 capsule by mouth Daily. 90 capsule 2   • PARoxetine CR (PAXIL-CR) 25 MG 24 hr tablet Take 1 tablet by mouth Every Morning. 90 tablet 1   • pravastatin (PRAVACHOL) 40 MG tablet Take 1 tablet by mouth Every Night. 90 tablet 1   • traZODone (DESYREL) 50 MG tablet Take 1 tablet by mouth Every Night. 90 tablet 1   • amoxicillin-clavulanate (AUGMENTIN) 875-125 MG per tablet Take 1 tablet by mouth 2 (Two) Times a Day. 14 tablet 0     No facility-administered medications prior to visit.        Patient Active Problem List   Diagnosis   • Essential hypertension   • Diabetes (CMS/HCC)   • GERD (gastroesophageal reflux disease)   • Insomnia   • Anxiety   • Mixed hyperlipidemia   • Precordial pain   • Abnormal EKG   • Left elbow pain   • Laceration of left elbow   • Cause of injury, fall       Advance Care Planning:  has an advance directive - a copy HAS NOT been provided    Identification of Risk Factors:  Risk factors include: weight , increased fall  "risk and depression.    Review of Systems   Constitutional: Negative.  Negative for chills, diaphoresis, fatigue and fever.   HENT: Negative.    Eyes: Negative.    Respiratory: Negative.    Cardiovascular: Negative.    Gastrointestinal: Negative.    Genitourinary: Negative.    Musculoskeletal: Negative.    Skin: Negative.    Neurological: Negative.    Psychiatric/Behavioral: Negative.  Negative for confusion.       Compared to one year ago, the patient feels his physical health is the same.  Compared to one year ago, the patient feels his mental health is worse.    Objective     Physical Exam   Constitutional: He is oriented to person, place, and time. He appears well-developed and well-nourished.   HENT:   Head: Normocephalic.   Right Ear: External ear normal.   Left Ear: External ear normal.   Eyes: EOM are normal. Pupils are equal, round, and reactive to light.   Neck: Normal range of motion. Neck supple.   Cardiovascular: Normal rate, regular rhythm and normal heart sounds.   Pulmonary/Chest: Effort normal and breath sounds normal.   Abdominal: Soft. Bowel sounds are normal.   Musculoskeletal: Normal range of motion.   Neurological: He is alert and oriented to person, place, and time.   Skin: Skin is warm. Capillary refill takes less than 2 seconds.   Psychiatric: He has a normal mood and affect. His behavior is normal.   Nursing note and vitals reviewed.      Vitals:    12/20/18 0841   BP: 162/86   Weight: 97.6 kg (215 lb 3.2 oz)   Height: 172.7 cm (68\")       Patient's Body mass index is 32.72 kg/m². BMI is above normal parameters. Recommendations include: educational material, exercise counseling and nutrition counseling.      Assessment/Plan   Patient Self-Management and Personalized Health Advice  The patient has been provided with information about: diet, exercise and fall prevention and preventive services including:   · Advance directive, Fall Risk assessment done.    Visit Diagnoses:    ICD-10-CM " ICD-9-CM   1. Medicare annual wellness visit, initial Z00.00 V70.0       No orders of the defined types were placed in this encounter.      Outpatient Encounter Medications as of 12/20/2018   Medication Sig Dispense Refill   • aspirin 81 MG EC tablet Take 81 mg by mouth Daily.     • glipiZIDE (GLUCOTROL) 5 MG tablet Take 1 tablet by mouth 2 (Two) Times a Day Before Meals. 60 tablet 3   • hydrALAZINE (APRESOLINE) 25 MG tablet Take 1 tablet by mouth 3 (Three) Times a Day. 90 tablet 5   • losartan-hydrochlorothiazide (HYZAAR) 100-25 MG per tablet Take 1 tablet by mouth Daily. 90 tablet 1   • metFORMIN (GLUCOPHAGE) 1000 MG tablet Take 1 tablet by mouth 2 (Two) Times a Day With Meals. 180 tablet 1   • omeprazole (priLOSEC) 40 MG capsule Take 1 capsule by mouth Daily. 90 capsule 2   • PARoxetine CR (PAXIL-CR) 25 MG 24 hr tablet Take 1 tablet by mouth Every Morning. 90 tablet 1   • pravastatin (PRAVACHOL) 40 MG tablet Take 1 tablet by mouth Every Night. 90 tablet 1   • traZODone (DESYREL) 50 MG tablet Take 1 tablet by mouth Every Night. 90 tablet 1   • [DISCONTINUED] amoxicillin-clavulanate (AUGMENTIN) 875-125 MG per tablet Take 1 tablet by mouth 2 (Two) Times a Day. 14 tablet 0     No facility-administered encounter medications on file as of 12/20/2018.        Reviewed use of high risk medication in the elderly: yes  Reviewed for potential of harmful drug interactions in the elderly: yes    Follow Up:  Return in about 3 months (around 3/20/2019) for Recheck.     An After Visit Summary and PPPS with all of these plans were given to the patient.          This document has been electronically signed by GEMINI Red on December 20, 2018 9:03 AM

## 2019-01-17 ENCOUNTER — TELEPHONE (OUTPATIENT)
Dept: FAMILY MEDICINE CLINIC | Facility: CLINIC | Age: 69
End: 2019-01-17

## 2019-01-17 ENCOUNTER — OFFICE VISIT (OUTPATIENT)
Dept: FAMILY MEDICINE CLINIC | Facility: CLINIC | Age: 69
End: 2019-01-17

## 2019-01-17 VITALS
DIASTOLIC BLOOD PRESSURE: 84 MMHG | HEIGHT: 68 IN | WEIGHT: 220 LBS | BODY MASS INDEX: 33.34 KG/M2 | SYSTOLIC BLOOD PRESSURE: 146 MMHG

## 2019-01-17 DIAGNOSIS — R07.81 RIB PAIN ON LEFT SIDE: Primary | ICD-10-CM

## 2019-01-17 PROCEDURE — 99213 OFFICE O/P EST LOW 20 MIN: CPT | Performed by: NURSE PRACTITIONER

## 2019-01-17 RX ORDER — DICLOFENAC SODIUM 75 MG/1
75 TABLET, DELAYED RELEASE ORAL 2 TIMES DAILY PRN
Qty: 60 TABLET | Refills: 0 | Status: SHIPPED | OUTPATIENT
Start: 2019-01-17 | End: 2019-02-11 | Stop reason: SDUPTHER

## 2019-01-17 NOTE — TELEPHONE ENCOUNTER
He called and wants to know if he can be seen today-having left side pain-been going on for about a week now.He said he wants to see Elvira-since is his family  and wanted msg sent and can be reached at [phone].

## 2019-01-17 NOTE — PROGRESS NOTES
"Subjective   Hung Ramsay is a 68 y.o. male.  Complains of left rib pain for the past several weeks.  \"I don't know if I hurt something in there when I fell or what.\"  Fell down some stairs around the beginning of last December and was seen in the emergency department as well as a follow-up in this office.  Left rib pain did not start until after he had already been seen.    Rib Injury   This is a new problem. The current episode started more than 1 month ago. The problem occurs constantly. The problem has been waxing and waning. Pertinent negatives include no change in bowel habit, chest pain, chills, diaphoresis, fatigue, fever, nausea, urinary symptoms or vomiting. Exacerbated by: positional  He has tried nothing for the symptoms. The treatment provided no relief.        The following portions of the patient's history were reviewed and updated as appropriate: allergies, current medications, past family history, past medical history, past social history, past surgical history and problem list.    Review of Systems   Constitutional: Negative.  Negative for chills, diaphoresis, fatigue and fever.   Respiratory: Negative.  Negative for shortness of breath.    Cardiovascular: Negative.  Negative for chest pain.   Gastrointestinal: Negative.  Negative for change in bowel habit, nausea and vomiting.   Genitourinary: Negative.    Skin: Negative.        Objective   Physical Exam   Constitutional: He is oriented to person, place, and time. He appears well-developed and well-nourished.   Cardiovascular: Normal rate, regular rhythm and normal heart sounds.   Pulmonary/Chest: Effort normal and breath sounds normal.       Abdominal: Soft. Bowel sounds are normal.   Neurological: He is alert and oriented to person, place, and time.   Skin: Skin is warm.   Psychiatric: He has a normal mood and affect. His behavior is normal.   Nursing note and vitals reviewed.      Assessment/Plan   Problems Addressed this Visit     None "      Visit Diagnoses     Rib pain on left side    -  Primary    Relevant Medications    diclofenac (VOLTAREN) 75 MG EC tablet    Other Relevant Orders    XR ribs 2 vw left        1.  Rib pain on left side:  Begin Voltaren as prescribed be taken 1 by mouth twice a day  Educated on possible side effects of this medication including but not limited to increased risk for gastrointestinal bleeding  Encouraged to take this medication with food in order to reduce GI discomfort  Educated not to take any other NSAIDs including but not limited to Motrin, Advil, ibuprofen, naproxen or Aleve while on this medication as this may increase his risk for gastrointestinal bleeding  Complete x-rays of the left ribs and will notify of results when available  Seek emergency medical treatment for any new or worsening rib pain, shortness of breath or chest pain    Continue on current medications as previously prescribed   Keep already scheduled appointment in March for follow-up or may be seen sooner as needed        This document has been electronically signed by GEMINI Red on January 17, 2019 10:41 AM

## 2019-01-18 ENCOUNTER — TELEPHONE (OUTPATIENT)
Dept: FAMILY MEDICINE CLINIC | Facility: CLINIC | Age: 69
End: 2019-01-18

## 2019-01-18 NOTE — PROGRESS NOTES
Per GEMINI Sorensen, Mr. Ramsay has been called with his recent CXR results & recommendations.  Continue his current medications and follow-up as planned or sooner if any problems.

## 2019-01-18 NOTE — TELEPHONE ENCOUNTER
Per GEMINI Sorensen, Mr. Ramsay has been called with his recent CXR results & recommendations.  Continue his current medications and follow-up as planned or sooner if any problems.      ----- Message from GEMINI Red sent at 1/18/2019  1:17 PM CST -----  Please call and let him know that the x-rays of his ribs were normal.  She continues to have the pain I will order a CT of his abdomen but I believe this is just bruising on the ribs which may take several weeks to months to heal.  Thank you.

## 2019-02-05 RX ORDER — ESOMEPRAZOLE MAGNESIUM 40 MG/1
CAPSULE, DELAYED RELEASE ORAL
Qty: 90 CAPSULE | Refills: 1 | Status: SHIPPED | OUTPATIENT
Start: 2019-02-05 | End: 2019-08-15 | Stop reason: SDUPTHER

## 2019-02-11 DIAGNOSIS — R07.81 RIB PAIN ON LEFT SIDE: ICD-10-CM

## 2019-02-11 RX ORDER — DICLOFENAC SODIUM 75 MG/1
75 TABLET, DELAYED RELEASE ORAL 2 TIMES DAILY PRN
Qty: 180 TABLET | Refills: 2 | Status: SHIPPED | OUTPATIENT
Start: 2019-02-11

## 2019-03-20 ENCOUNTER — LAB (OUTPATIENT)
Dept: LAB | Facility: HOSPITAL | Age: 69
End: 2019-03-20

## 2019-03-20 ENCOUNTER — OFFICE VISIT (OUTPATIENT)
Dept: FAMILY MEDICINE CLINIC | Facility: CLINIC | Age: 69
End: 2019-03-20

## 2019-03-20 VITALS
WEIGHT: 221.2 LBS | HEIGHT: 68 IN | DIASTOLIC BLOOD PRESSURE: 108 MMHG | SYSTOLIC BLOOD PRESSURE: 150 MMHG | BODY MASS INDEX: 33.52 KG/M2

## 2019-03-20 DIAGNOSIS — E78.2 MIXED HYPERLIPIDEMIA: ICD-10-CM

## 2019-03-20 DIAGNOSIS — I10 ESSENTIAL HYPERTENSION: ICD-10-CM

## 2019-03-20 DIAGNOSIS — E11.9 TYPE 2 DIABETES MELLITUS WITHOUT COMPLICATION, WITHOUT LONG-TERM CURRENT USE OF INSULIN (HCC): ICD-10-CM

## 2019-03-20 DIAGNOSIS — Z13.29 SCREENING FOR HYPOTHYROIDISM: ICD-10-CM

## 2019-03-20 DIAGNOSIS — Z85.038 HISTORY OF COLON CANCER: ICD-10-CM

## 2019-03-20 DIAGNOSIS — R10.12 LEFT UPPER QUADRANT PAIN: ICD-10-CM

## 2019-03-20 DIAGNOSIS — I10 ESSENTIAL HYPERTENSION: Primary | ICD-10-CM

## 2019-03-20 LAB
ALBUMIN SERPL-MCNC: 4.2 G/DL (ref 3.4–4.8)
ALBUMIN/GLOB SERPL: 1.3 G/DL (ref 1.1–1.8)
ALP SERPL-CCNC: 95 U/L (ref 38–126)
ALT SERPL W P-5'-P-CCNC: 19 U/L (ref 21–72)
ANION GAP SERPL CALCULATED.3IONS-SCNC: 10 MMOL/L (ref 5–15)
ARTICHOKE IGE QN: 64 MG/DL (ref 1–129)
AST SERPL-CCNC: 28 U/L (ref 17–59)
BASOPHILS # BLD AUTO: 0.01 10*3/MM3 (ref 0–0.2)
BASOPHILS NFR BLD AUTO: 0.1 % (ref 0–1.5)
BILIRUB SERPL-MCNC: 0.5 MG/DL (ref 0.2–1.3)
BUN BLD-MCNC: 16 MG/DL (ref 7–21)
BUN/CREAT SERPL: 18 (ref 7–25)
CALCIUM SPEC-SCNC: 9 MG/DL (ref 8.4–10.2)
CHLORIDE SERPL-SCNC: 97 MMOL/L (ref 95–110)
CHOLEST SERPL-MCNC: 140 MG/DL (ref 0–199)
CO2 SERPL-SCNC: 28 MMOL/L (ref 22–31)
CREAT BLD-MCNC: 0.89 MG/DL (ref 0.7–1.3)
DEPRECATED RDW RBC AUTO: 42.7 FL (ref 37–54)
EOSINOPHIL # BLD AUTO: 0.1 10*3/MM3 (ref 0–0.4)
EOSINOPHIL NFR BLD AUTO: 0.9 % (ref 0.3–6.2)
ERYTHROCYTE [DISTWIDTH] IN BLOOD BY AUTOMATED COUNT: 13.4 % (ref 12.3–15.4)
GFR SERPL CREATININE-BSD FRML MDRD: 85 ML/MIN/1.73 (ref 49–113)
GLOBULIN UR ELPH-MCNC: 3.3 GM/DL (ref 2.3–3.5)
GLUCOSE BLD-MCNC: 362 MG/DL (ref 60–100)
HBA1C MFR BLD: 9 % (ref 4–5.6)
HCT VFR BLD AUTO: 43.6 % (ref 37.5–51)
HDLC SERPL-MCNC: 27 MG/DL (ref 60–200)
HGB BLD-MCNC: 15.3 G/DL (ref 13–17.7)
IMM GRANULOCYTES # BLD AUTO: 0.04 10*3/MM3 (ref 0–0.05)
IMM GRANULOCYTES NFR BLD AUTO: 0.4 % (ref 0–0.5)
LDLC/HDLC SERPL: 2.12 {RATIO} (ref 0–3.55)
LYMPHOCYTES # BLD AUTO: 1.6 10*3/MM3 (ref 0.7–3.1)
LYMPHOCYTES NFR BLD AUTO: 14.6 % (ref 19.6–45.3)
MCH RBC QN AUTO: 30.8 PG (ref 26.6–33)
MCHC RBC AUTO-ENTMCNC: 35.1 G/DL (ref 31.5–35.7)
MCV RBC AUTO: 87.9 FL (ref 79–97)
MONOCYTES # BLD AUTO: 0.76 10*3/MM3 (ref 0.1–0.9)
MONOCYTES NFR BLD AUTO: 6.9 % (ref 5–12)
NEUTROPHILS # BLD AUTO: 8.47 10*3/MM3 (ref 1.4–7)
NEUTROPHILS NFR BLD AUTO: 77.1 % (ref 42.7–76)
NRBC BLD AUTO-RTO: 0 /100 WBC (ref 0–0)
PLATELET # BLD AUTO: 193 10*3/MM3 (ref 140–450)
PMV BLD AUTO: 11.9 FL (ref 6–12)
POTASSIUM BLD-SCNC: 4.8 MMOL/L (ref 3.5–5.1)
PROT SERPL-MCNC: 7.5 G/DL (ref 6.3–8.6)
RBC # BLD AUTO: 4.96 10*6/MM3 (ref 4.14–5.8)
SODIUM BLD-SCNC: 135 MMOL/L (ref 137–145)
TRIGL SERPL-MCNC: 279 MG/DL (ref 20–199)
TSH SERPL DL<=0.05 MIU/L-ACNC: 1.72 MIU/ML (ref 0.46–4.68)
WBC NRBC COR # BLD: 10.98 10*3/MM3 (ref 3.4–10.8)

## 2019-03-20 PROCEDURE — 80053 COMPREHEN METABOLIC PANEL: CPT

## 2019-03-20 PROCEDURE — 80061 LIPID PANEL: CPT

## 2019-03-20 PROCEDURE — 99214 OFFICE O/P EST MOD 30 MIN: CPT | Performed by: NURSE PRACTITIONER

## 2019-03-20 PROCEDURE — 84443 ASSAY THYROID STIM HORMONE: CPT

## 2019-03-20 PROCEDURE — 85025 COMPLETE CBC W/AUTO DIFF WBC: CPT

## 2019-03-20 PROCEDURE — 83036 HEMOGLOBIN GLYCOSYLATED A1C: CPT

## 2019-03-20 NOTE — PROGRESS NOTES
Subjective   Hung Ramsay is a 68 y.o. male.  Three month follow-up.  Continues to complain of left upper quadrant abdominal pain.  Was evaluated at previous visit at the time when the pain was more centered around his rib.  Pain is now just below his rib and intermittent in nature.    Diabetes   He presents for his follow-up diabetic visit. He has type 2 diabetes mellitus. His disease course has been stable. Pertinent negatives for hypoglycemia include no confusion or nervousness/anxiousness. Pertinent negatives for diabetes include no polydipsia, no polyphagia and no polyuria. There are no hypoglycemic complications. Symptoms are stable. There are no diabetic complications. Risk factors for coronary artery disease include diabetes mellitus, dyslipidemia, male sex, obesity, hypertension and family history. Current diabetic treatment includes oral agent (dual therapy). He is compliant with treatment all of the time. His weight is stable. He is following a generally healthy diet. Meal planning includes avoidance of concentrated sweets. There is no change in his home blood glucose trend. An ACE inhibitor/angiotensin II receptor blocker is being taken.   Hypertension   This is a new problem. The current episode started more than 1 month ago. The problem has been gradually improving since onset. The problem is controlled. Pertinent negatives include no palpitations or shortness of breath. There are no associated agents to hypertension. Risk factors for coronary artery disease include diabetes mellitus, dyslipidemia, family history, male gender, obesity, sedentary lifestyle and smoking/tobacco exposure. Current antihypertension treatment includes direct vasodilators. The current treatment provides moderate improvement. There are no compliance problems.    Hyperlipidemia   This is a chronic problem. The current episode started more than 1 year ago. Recent lipid tests were reviewed and are variable. Exacerbating  diseases include diabetes. Factors aggravating his hyperlipidemia include fatty foods. Pertinent negatives include no shortness of breath. Current antihyperlipidemic treatment includes statins. The current treatment provides mild improvement of lipids. There are no compliance problems.  Risk factors for coronary artery disease include diabetes mellitus and hypertension.   Abdominal Pain   This is a chronic problem. The current episode started more than 1 month ago. The onset quality is gradual. The problem occurs daily. The problem has been waxing and waning. The pain is located in the LUQ. The abdominal pain does not radiate. Pertinent negatives include no diarrhea. Nothing aggravates the pain. The pain is relieved by certain positions. He has tried nothing for the symptoms.        The following portions of the patient's history were reviewed and updated as appropriate:     Current Outpatient Medications   Medication Sig Dispense Refill   • aspirin 81 MG EC tablet Take 81 mg by mouth Daily.     • diclofenac (VOLTAREN) 75 MG EC tablet Take 1 tablet by mouth 2 (Two) Times a Day As Needed (pain). 180 tablet 2   • esomeprazole (nexIUM) 40 MG capsule TAKE 1 CAPSULE BY MOUTH EVERY DAY IN THE MORNING BEFORE BREAKFAST 90 capsule 1   • glipiZIDE (GLUCOTROL) 5 MG tablet Take 1 tablet by mouth 2 (Two) Times a Day Before Meals. 60 tablet 3   • hydrALAZINE (APRESOLINE) 25 MG tablet Take 1 tablet by mouth 3 (Three) Times a Day. 90 tablet 5   • losartan-hydrochlorothiazide (HYZAAR) 100-25 MG per tablet Take 1 tablet by mouth Daily. 90 tablet 1   • metFORMIN (GLUCOPHAGE) 1000 MG tablet Take 1 tablet by mouth 2 (Two) Times a Day With Meals. 180 tablet 1   • omeprazole (priLOSEC) 40 MG capsule Take 1 capsule by mouth Daily. 90 capsule 2   • PARoxetine CR (PAXIL-CR) 25 MG 24 hr tablet Take 1 tablet by mouth Every Morning. 90 tablet 1   • pravastatin (PRAVACHOL) 40 MG tablet Take 1 tablet by mouth Every Night. 90 tablet 1   •  "traZODone (DESYREL) 50 MG tablet Take 1 tablet by mouth Every Night. 90 tablet 1     No current facility-administered medications for this visit.      Current Outpatient Medications on File Prior to Visit   Medication Sig   • aspirin 81 MG EC tablet Take 81 mg by mouth Daily.   • diclofenac (VOLTAREN) 75 MG EC tablet Take 1 tablet by mouth 2 (Two) Times a Day As Needed (pain).   • esomeprazole (nexIUM) 40 MG capsule TAKE 1 CAPSULE BY MOUTH EVERY DAY IN THE MORNING BEFORE BREAKFAST   • glipiZIDE (GLUCOTROL) 5 MG tablet Take 1 tablet by mouth 2 (Two) Times a Day Before Meals.   • hydrALAZINE (APRESOLINE) 25 MG tablet Take 1 tablet by mouth 3 (Three) Times a Day.   • losartan-hydrochlorothiazide (HYZAAR) 100-25 MG per tablet Take 1 tablet by mouth Daily.   • metFORMIN (GLUCOPHAGE) 1000 MG tablet Take 1 tablet by mouth 2 (Two) Times a Day With Meals.   • omeprazole (priLOSEC) 40 MG capsule Take 1 capsule by mouth Daily.   • PARoxetine CR (PAXIL-CR) 25 MG 24 hr tablet Take 1 tablet by mouth Every Morning.   • pravastatin (PRAVACHOL) 40 MG tablet Take 1 tablet by mouth Every Night.   • traZODone (DESYREL) 50 MG tablet Take 1 tablet by mouth Every Night.     No current facility-administered medications on file prior to visit.      He has No Known Allergies..    Review of Systems   Constitutional: Negative.    HENT: Negative.    Eyes: Negative.    Respiratory: Negative.  Negative for shortness of breath.    Cardiovascular: Negative.  Negative for palpitations.   Gastrointestinal: Positive for abdominal pain. Negative for diarrhea.   Endocrine: Negative.  Negative for polydipsia, polyphagia and polyuria.   Genitourinary: Negative.    Musculoskeletal: Negative.    Skin: Negative.    Neurological: Negative.    Psychiatric/Behavioral: Negative.  Negative for confusion. The patient is not nervous/anxious.        Objective    Visit Vitals  BP (!) 150/108 Comment: Has not had bp med this morning   Ht 172.7 cm (68\")   Wt 100 kg " (221 lb 3.2 oz)   BMI 33.63 kg/m²     Physical Exam   Constitutional: He is oriented to person, place, and time. He appears well-developed and well-nourished.   HENT:   Head: Normocephalic and atraumatic.   Right Ear: External ear normal.   Left Ear: External ear normal.   Nose: Nose normal.   Eyes: EOM are normal. Pupils are equal, round, and reactive to light.   Neck: Normal range of motion. Neck supple.   Cardiovascular: Normal rate, regular rhythm and normal heart sounds.   Pulmonary/Chest: Effort normal and breath sounds normal.   Abdominal: Soft. Bowel sounds are normal.   Musculoskeletal: Normal range of motion.   Neurological: He is alert and oriented to person, place, and time.   Skin: Skin is warm and dry. Capillary refill takes less than 2 seconds.   Psychiatric: He has a normal mood and affect. His behavior is normal.   Nursing note and vitals reviewed.      Assessment/Plan   Problems Addressed this Visit        Cardiovascular and Mediastinum    Essential hypertension - Primary    Relevant Orders    CBC & Differential    Comprehensive Metabolic Panel    Mixed hyperlipidemia    Relevant Orders    Lipid panel       Endocrine    Diabetes (CMS/[facility])    Relevant Orders    Hemoglobin A1c      Other Visit Diagnoses     Left upper quadrant pain        Relevant Orders    CT Abdomen Pelvis With Contrast    History of colon cancer        Relevant Orders    Ambulatory Referral For Screening Colonoscopy    Screening for hypothyroidism        Relevant Orders    TSH        1. Essential hypertension:  Continue on Hyzaar as previously prescribed  Complete CBC and chemistry panel as ordered and will notify of results when available  Encouraged to adhere to low-sodium diet of no more than 2000 mg/day     2. Mixed hyperlipidemia:  Continue on Pravachol as previously prescribed  Complete fasting lipid panel as ordered and will notify of results when available  Encouraged to adhere to low-fat diet     3.Diabetes:  Continue on  Glucophage and Glucotrol was previously prescribed  Complete hemoglobin A1c is ordered and will notify of results when available  Encouraged to adhere to ADA diet  Educated on importance of thorough foot care    4.  Left upper quadrant pain:  Radiology will call to schedule CT of the abdomen and pelvis and will notify of results when available    5.  History of colon cancer:  Referral placed for screening colonoscopy and will call to schedule appointment    6.  Screening for hypothyroidism:  Complete TSH is ordered and will notify of results when available      Continue on current medications as previously prescribed   Return in about 3 months (around 6/20/2019) for Recheck.          This document has been electronically signed by GEMINI Red on March 20, 2019 8:49 AM

## 2019-03-21 DIAGNOSIS — E78.1 HYPERTRIGLYCERIDEMIA: Primary | ICD-10-CM

## 2019-03-21 RX ORDER — ICOSAPENT ETHYL 1000 MG/1
2 CAPSULE ORAL 2 TIMES DAILY WITH MEALS
Qty: 120 CAPSULE | Refills: 2 | Status: SHIPPED | OUTPATIENT
Start: 2019-03-21 | End: 2019-06-20 | Stop reason: SDUPTHER

## 2019-03-25 ENCOUNTER — TELEPHONE (OUTPATIENT)
Dept: FAMILY MEDICINE CLINIC | Facility: CLINIC | Age: 69
End: 2019-03-25

## 2019-03-25 DIAGNOSIS — E11.9 TYPE 2 DIABETES MELLITUS WITHOUT COMPLICATION, WITHOUT LONG-TERM CURRENT USE OF INSULIN (HCC): Primary | ICD-10-CM

## 2019-03-25 NOTE — PROGRESS NOTES
Per GEMINI Fuentes, Mr. Ramsay has been called with recent lab results & recommendations.  Continue current medications and follow-up as planned or sooner if any problems.    Elvira  Yes, He will try the injectable medication for his elevated blood sugars  He uses [facility]

## 2019-03-25 NOTE — TELEPHONE ENCOUNTER
Per GEMINI Fuentes, Mr. Ramsay has been called with recent lab results & recommendations.  Continue current medications and follow-up as planned or sooner if any problems.    Elvira  Yes, He will try the injectable medication for his elevated blood sugars  He uses [facility].  (Information Sent to GEMINI Felix)      ----- Message from GEMINI Red sent at 3/21/2019  7:24 AM CDT -----  Glucose was extremely high at 362.  His hemoglobin A1c is now up to 9.  That is the highest been in over 4 years.  His sugars are not well controlled.  Would you please ask him if he would consider being placed on an injectable medication.  This is not an insulin.  This would be an injection once a week.  It has been shown to significantly reduce hemoglobin A1c's as well as help with weight loss.  He has got to stop eating sweets and watch his carbohydrates.  His triglycerides are also elevated at 279.  Again this is the highest they have been in for years.  Good cholesterol is way too low at 27.  He needs to adhere to a low-fat diet but also need to place him on a medication strictly for his triglycerides.  I have sent in a prescription for a medication called the CPAP.  He will take this twice a day.  If for some reason his insurance does not cover this please have him call and let me know so that I can send in a separate medication.  He has got to stop eating fried, fatty and  greasy foods.  Potassium is a little low at 135 so he needs to increase his water intake.  Thank you.

## 2019-03-26 ENCOUNTER — TELEPHONE (OUTPATIENT)
Dept: FAMILY MEDICINE CLINIC | Facility: CLINIC | Age: 69
End: 2019-03-26

## 2019-04-04 ENCOUNTER — HOSPITAL ENCOUNTER (OUTPATIENT)
Dept: CT IMAGING | Facility: HOSPITAL | Age: 69
Discharge: HOME | End: 2019-04-04
Admitting: NURSE PRACTITIONER

## 2019-04-04 DIAGNOSIS — R10.12 LEFT UPPER QUADRANT PAIN: ICD-10-CM

## 2019-04-04 PROCEDURE — 74177 CT ABD & PELVIS W/CONTRAST: CPT

## 2019-04-04 PROCEDURE — 25010000002 IOPAMIDOL 61 % SOLUTION: Performed by: NURSE PRACTITIONER

## 2019-04-04 RX ADMIN — IOPAMIDOL 92 ML: 612 INJECTION, SOLUTION INTRAVENOUS at 11:16

## 2019-04-05 ENCOUNTER — TELEPHONE (OUTPATIENT)
Dept: FAMILY MEDICINE CLINIC | Facility: CLINIC | Age: 69
End: 2019-04-05

## 2019-04-05 NOTE — TELEPHONE ENCOUNTER
Per GEMINI Fuentes, Mr. Ramsay has been called with recent CT of the Abdomen results & recommendations.  Continue current medications and follow-up as planned or sooner if any problems.      ----- Message from GEMINI Red sent at 4/4/2019  3:19 PM CDT -----  Has multiple diverticula but no signs of inflammation or diverticulosis.  If he continues to complain of pain I can refer him to gastroenterology for further evaluation.  Thank you.

## 2019-04-05 NOTE — PROGRESS NOTES
Per GEMINI Fuentes, Mr. Ramsay has been called with recent CT of the Abdomen results & recommendations.  Continue current medications and follow-up as planned or sooner if any problems.

## 2019-04-07 DIAGNOSIS — E78.5 HYPERLIPIDEMIA, UNSPECIFIED HYPERLIPIDEMIA TYPE: ICD-10-CM

## 2019-04-08 RX ORDER — PRAVASTATIN SODIUM 40 MG
TABLET ORAL
Qty: 90 TABLET | Refills: 1 | Status: SHIPPED | OUTPATIENT
Start: 2019-04-08 | End: 2019-06-20 | Stop reason: SDUPTHER

## 2019-04-18 ENCOUNTER — OFFICE VISIT (OUTPATIENT)
Dept: GASTROENTEROLOGY | Facility: CLINIC | Age: 69
End: 2019-04-18

## 2019-04-18 VITALS
OXYGEN SATURATION: 97 % | WEIGHT: 218.8 LBS | HEART RATE: 77 BPM | HEIGHT: 67 IN | BODY MASS INDEX: 34.34 KG/M2 | SYSTOLIC BLOOD PRESSURE: 142 MMHG | DIASTOLIC BLOOD PRESSURE: 82 MMHG

## 2019-04-18 DIAGNOSIS — Z80.0 FAMILY HISTORY OF GI MALIGNANCY: Primary | ICD-10-CM

## 2019-04-18 PROCEDURE — S0260 H&P FOR SURGERY: HCPCS | Performed by: INTERNAL MEDICINE

## 2019-04-18 RX ORDER — SODIUM, POTASSIUM,MAG SULFATES 17.5-3.13G
1 SOLUTION, RECONSTITUTED, ORAL ORAL EVERY 12 HOURS
Qty: 1 BOTTLE | Refills: 0 | Status: ON HOLD | OUTPATIENT
Start: 2019-04-18 | End: 2019-05-08

## 2019-04-18 RX ORDER — DEXTROSE AND SODIUM CHLORIDE 5; .45 G/100ML; G/100ML
30 INJECTION, SOLUTION INTRAVENOUS CONTINUOUS PRN
Status: CANCELLED | OUTPATIENT
Start: 2019-05-08

## 2019-04-18 NOTE — PROGRESS NOTES
[facility]      Chief Complaint:   Chief Complaint   Patient presents with   • screening colon consult       Subjective     HPI:   Patient for screening colonoscopy.  Patient has a strong family history of colon cancer.  Patient and his father and brother have colon cancer.    Plan; we will schedule patient for colonoscopy as patient has not had a colonoscopy in 7 years.    Past Medical History:   Past Medical History:   Diagnosis Date   • Anxiety    • Diabetes (CMS/HCC)    • Essential hypertension    • GERD (gastroesophageal reflux disease)    • Insomnia        Family History:  Family History   Problem Relation Age of Onset   • Diabetes Mother    • Hypertension Mother    • Heart disease Mother    • Cancer Father        Social History:   reports that he has quit smoking. He has quit using smokeless tobacco. He reports that he does not drink alcohol or use drugs.    Medications:   Prior to Admission medications    Medication Sig Start Date End Date Taking? Authorizing Provider   aspirin 81 MG EC tablet Take 81 mg by mouth Daily.   Yes Provider, MD Dae   diclofenac (VOLTAREN) 75 MG EC tablet Take 1 tablet by mouth 2 (Two) Times a Day As Needed (pain). 2/11/19  Yes Elvira Ramsay APRN   Dulaglutide 0.75 MG/0.5ML solution pen-injector Inject 0.75 mg under the skin into the appropriate area as directed 1 (One) Time Per Week. 3/25/19  Yes Elvira Ramsay APRN   esomeprazole (nexIUM) 40 MG capsule TAKE 1 CAPSULE BY MOUTH EVERY DAY IN THE MORNING BEFORE BREAKFAST 2/5/19  Yes Elvira Ramsay APRN   glipiZIDE (GLUCOTROL) 5 MG tablet Take 1 tablet by mouth 2 (Two) Times a Day Before Meals. 12/17/18  Yes Elvira Ramsay APRN   hydrALAZINE (APRESOLINE) 25 MG tablet Take 1 tablet by mouth 3 (Three) Times a Day. 8/13/18  Yes Elvira Ramsay APRN   icosapent ethyl (VASCEPA) 1 g capsule capsule Take 2 g by mouth 2 (Two) Times a Day With Meals. 3/21/19  Yes Elvira Ramsay APRN  "  losartan-hydrochlorothiazide (HYZAAR) 100-25 MG per tablet Take 1 tablet by mouth Daily. 8/17/18  Yes Elvira Ramsay APRN   metFORMIN (GLUCOPHAGE) 1000 MG tablet Take 1 tablet by mouth 2 (Two) Times a Day With Meals. 12/17/18  Yes Elvira Ramsay APRN   omeprazole (priLOSEC) 40 MG capsule Take 1 capsule by mouth Daily. 11/12/18  Yes Elvira Ramsay APRN   PARoxetine CR (PAXIL-CR) 25 MG 24 hr tablet Take 1 tablet by mouth Every Morning. 10/30/18  Yes Elvira Ramsay APRN   pravastatin (PRAVACHOL) 40 MG tablet TAKE 1 TABLET BY MOUTH EVERY DAY AT NIGHT 4/8/19  Yes Elvira Ramsay APRN   traZODone (DESYREL) 50 MG tablet Take 1 tablet by mouth Every Night. 8/17/18  Yes Elvira Ramsay APRN   sodium-potassium-magnesium sulfates (SUPREP) 17.5-3.13-1.6 GM/177ML solution oral solution Take 1 bottle by mouth Every 12 (Twelve) Hours. 4/18/19   Cesar Santos MD       Allergies:  Patient has no known allergies.    ROS:    Review of Systems   Constitutional: Negative for activity change, appetite change and unexpected weight change.   HENT: Negative for congestion, sore throat and trouble swallowing.    Respiratory: Negative for cough, choking and shortness of breath.    Cardiovascular: Negative for chest pain.   Gastrointestinal: Negative for abdominal distention, abdominal pain, anal bleeding, blood in stool, constipation, diarrhea, nausea, rectal pain and vomiting.   Endocrine: Negative for heat intolerance, polydipsia and polyphagia.   Genitourinary: Negative for difficulty urinating.   Musculoskeletal: Negative for arthralgias.   Skin: Negative for color change, pallor, rash and wound.   Allergic/Immunologic: Negative for food allergies.   Neurological: Negative for dizziness, syncope, weakness and headaches.   Psychiatric/Behavioral: Negative for agitation, behavioral problems, confusion and decreased concentration.     Objective     Blood pressure 142/82, pulse 77, height 170.2 cm (67\"), weight 99.2 kg (218 lb 12.8 oz), " SpO2 97 %.    Physical Exam   Constitutional: He is oriented to person, place, and time. He appears well-developed and well-nourished. No distress.   HENT:   Head: Normocephalic and atraumatic.   Cardiovascular: Normal rate, regular rhythm, normal heart sounds and intact distal pulses. Exam reveals no gallop and no friction rub.   No murmur heard.  Pulmonary/Chest: Breath sounds normal. No respiratory distress. He has no wheezes. He has no rales. He exhibits no tenderness.   Abdominal: Soft. Bowel sounds are normal. He exhibits no distension and no mass. There is no tenderness. There is no rebound and no guarding. No hernia.   Musculoskeletal: Normal range of motion. He exhibits no edema.   Neurological: He is alert and oriented to person, place, and time.   Skin: Skin is warm and dry. No rash noted. He is not diaphoretic. No erythema. No pallor.   Psychiatric: He has a normal mood and affect. His behavior is normal. Judgment and thought content normal.        Assessment/Plan   Hung was seen today for screening colon consult.    Diagnoses and all orders for this visit:    Family history of GI malignancy  -     Case Request; Standing  -     dextrose 5 % and sodium chloride 0.45 % infusion  -     Case Request    Other orders  -     Follow Anesthesia Guidelines / Standing Orders; Future  -     Implement Anesthesia Orders Day of Procedure; Standing  -     Obtain Informed Consent; Standing  -     POC Glucose Once; Standing  -     sodium-potassium-magnesium sulfates (SUPREP) 17.5-3.13-1.6 GM/177ML solution oral solution; Take 1 bottle by mouth Every 12 (Twelve) Hours.        COLONOSCOPY (N/A)     Diagnosis Plan   1. Family history of GI malignancy  Case Request    dextrose 5 % and sodium chloride 0.45 % infusion    Case Request       Anticipated Surgical Procedure:  Orders Placed This Encounter   Procedures   • Follow Anesthesia Guidelines / Standing Orders     Standing Status:   Future       The risks, benefits, and  alternatives of this procedure have been discussed with the patient or the responsible party- the patient understands and agrees to proceed.

## 2019-04-18 NOTE — PATIENT INSTRUCTIONS

## 2019-04-28 DIAGNOSIS — F41.9 ANXIETY: ICD-10-CM

## 2019-04-29 RX ORDER — PAROXETINE HYDROCHLORIDE HEMIHYDRATE 25 MG/1
TABLET, FILM COATED, EXTENDED RELEASE ORAL
Qty: 90 TABLET | Refills: 1 | Status: SHIPPED | OUTPATIENT
Start: 2019-04-29 | End: 2019-06-20 | Stop reason: SDUPTHER

## 2019-05-03 DIAGNOSIS — G47.00 INSOMNIA, UNSPECIFIED TYPE: ICD-10-CM

## 2019-05-03 DIAGNOSIS — F41.9 ANXIETY: ICD-10-CM

## 2019-05-03 RX ORDER — TRAZODONE HYDROCHLORIDE 50 MG/1
TABLET ORAL
Qty: 90 TABLET | Refills: 1 | Status: SHIPPED | OUTPATIENT
Start: 2019-05-03 | End: 2019-10-25 | Stop reason: SDUPTHER

## 2019-05-08 ENCOUNTER — ANESTHESIA EVENT (OUTPATIENT)
Dept: GASTROENTEROLOGY | Facility: HOSPITAL | Age: 69
End: 2019-05-08

## 2019-05-08 ENCOUNTER — ANESTHESIA (OUTPATIENT)
Dept: GASTROENTEROLOGY | Facility: HOSPITAL | Age: 69
End: 2019-05-08

## 2019-05-08 ENCOUNTER — HOSPITAL ENCOUNTER (OUTPATIENT)
Facility: HOSPITAL | Age: 69
Setting detail: HOSPITAL OUTPATIENT SURGERY
Discharge: HOME | End: 2019-05-08
Attending: INTERNAL MEDICINE | Admitting: INTERNAL MEDICINE

## 2019-05-08 VITALS
RESPIRATION RATE: 18 BRPM | HEIGHT: 67 IN | WEIGHT: 210 LBS | SYSTOLIC BLOOD PRESSURE: 119 MMHG | TEMPERATURE: 97.1 F | HEART RATE: 68 BPM | OXYGEN SATURATION: 96 % | BODY MASS INDEX: 32.96 KG/M2 | DIASTOLIC BLOOD PRESSURE: 67 MMHG

## 2019-05-08 DIAGNOSIS — Z80.0 FAMILY HISTORY OF GI MALIGNANCY: ICD-10-CM

## 2019-05-08 LAB — GLUCOSE BLDC GLUCOMTR-MCNC: 125 MG/DL (ref 70–130)

## 2019-05-08 PROCEDURE — 25010000002 PROPOFOL 10 MG/ML EMULSION: Performed by: NURSE ANESTHETIST, CERTIFIED REGISTERED

## 2019-05-08 PROCEDURE — G0105 COLORECTAL SCRN; HI RISK IND: HCPCS | Performed by: INTERNAL MEDICINE

## 2019-05-08 PROCEDURE — 82962 GLUCOSE BLOOD TEST: CPT

## 2019-05-08 RX ORDER — DEXTROSE AND SODIUM CHLORIDE 5; .45 G/100ML; G/100ML
30 INJECTION, SOLUTION INTRAVENOUS CONTINUOUS PRN
Status: DISCONTINUED | OUTPATIENT
Start: 2019-05-08 | End: 2019-05-08 | Stop reason: HOSPADM

## 2019-05-08 RX ORDER — PROPOFOL 10 MG/ML
VIAL (ML) INTRAVENOUS AS NEEDED
Status: DISCONTINUED | OUTPATIENT
Start: 2019-05-08 | End: 2019-05-08 | Stop reason: SURG

## 2019-05-08 RX ADMIN — PROPOFOL 30 MG: 10 INJECTION, EMULSION INTRAVENOUS at 10:50

## 2019-05-08 RX ADMIN — DEXTROSE AND SODIUM CHLORIDE 30 ML/HR: 5; 450 INJECTION, SOLUTION INTRAVENOUS at 09:41

## 2019-05-08 RX ADMIN — PROPOFOL 100 MG: 10 INJECTION, EMULSION INTRAVENOUS at 10:47

## 2019-05-08 NOTE — ANESTHESIA POSTPROCEDURE EVALUATION
Patient: Hung Ramsay    Procedure Summary     Date:  05/08/19 Room / Location:  [facility] ENDOSCOPY 3 / [facility]COPY    Anesthesia Start:  1044 Anesthesia Stop:  1058    Procedure:  COLONOSCOPY (N/A ) Diagnosis:       Family history of GI malignancy      (Family history of GI malignancy [Z80.0])    Surgeon:  Cesar Santos MD Provider:  Giuseppe Gonsales CRNA    Anesthesia Type:  MAC ASA Status:  3          Anesthesia Type: MAC  Last vitals  BP   157/72 (05/08/19 0917)   Temp   97.7 °F (36.5 °C) (05/08/19 0917)   Pulse   76 (05/08/19 0917)   Resp   18 (05/08/19 0917)     SpO2   99 % (05/08/19 0917)     Post Anesthesia Care and Evaluation    Patient location during evaluation: bedside  Patient participation: waiting for patient participation  Level of consciousness: responsive to verbal stimuli  Pain management: adequate  Airway patency: patent  Anesthetic complications: No anesthetic complications  PONV Status: none  Cardiovascular status: acceptable  Respiratory status: acceptable  Hydration status: acceptable

## 2019-05-08 NOTE — ANESTHESIA PREPROCEDURE EVALUATION
Anesthesia Evaluation     NPO Solid Status: > 8 hours  NPO Liquid Status: > 8 hours           Airway   Mallampati: II  TM distance: >3 FB  Neck ROM: full  no difficulty expected  Dental - normal exam     Pulmonary - normal exam   Cardiovascular - normal exam    (+) hypertension, hyperlipidemia,       Neuro/Psych  (+) psychiatric history,     GI/Hepatic/Renal/Endo    (+)  GERD,  diabetes mellitus,     Musculoskeletal     Abdominal    Substance History      OB/GYN          Other                        Anesthesia Plan    ASA 3     MAC     intravenous induction   Anesthetic plan, all risks, benefits, and alternatives have been provided, discussed and informed consent has been obtained with: patient.

## 2019-05-11 NOTE — H&P
Past Surgical History:   Procedure Laterality Date   • COLECTOMY PARTIAL / TOTAL     • COLONOSCOPY N/A 5/8/2019    Procedure: COLONOSCOPY;  Surgeon: Cesar Santos MD;  Location: [facility] ENDOSCOPY;  Service: Gastroenterology   • ENDOSCOPY AND COLONOSCOPY  04/19/2012

## 2019-06-01 DIAGNOSIS — E11.9 TYPE 2 DIABETES MELLITUS WITHOUT COMPLICATION, WITHOUT LONG-TERM CURRENT USE OF INSULIN (HCC): ICD-10-CM

## 2019-06-03 RX ORDER — GLIPIZIDE 5 MG/1
5 TABLET ORAL
Qty: 60 TABLET | Refills: 0 | Status: SHIPPED | OUTPATIENT
Start: 2019-06-03 | End: 2019-06-20 | Stop reason: SDUPTHER

## 2019-06-20 ENCOUNTER — APPOINTMENT (OUTPATIENT)
Dept: LAB | Facility: HOSPITAL | Age: 69
End: 2019-06-20

## 2019-06-20 ENCOUNTER — OFFICE VISIT (OUTPATIENT)
Dept: FAMILY MEDICINE CLINIC | Facility: CLINIC | Age: 69
End: 2019-06-20

## 2019-06-20 VITALS
WEIGHT: 210.7 LBS | DIASTOLIC BLOOD PRESSURE: 74 MMHG | BODY MASS INDEX: 33.07 KG/M2 | HEIGHT: 67 IN | SYSTOLIC BLOOD PRESSURE: 130 MMHG

## 2019-06-20 DIAGNOSIS — I10 ESSENTIAL HYPERTENSION: Primary | ICD-10-CM

## 2019-06-20 DIAGNOSIS — F41.9 ANXIETY: ICD-10-CM

## 2019-06-20 DIAGNOSIS — E11.9 TYPE 2 DIABETES MELLITUS WITHOUT COMPLICATION, WITHOUT LONG-TERM CURRENT USE OF INSULIN (HCC): ICD-10-CM

## 2019-06-20 DIAGNOSIS — Z12.5 SCREENING FOR MALIGNANT NEOPLASM OF PROSTATE: ICD-10-CM

## 2019-06-20 DIAGNOSIS — E78.1 HYPERTRIGLYCERIDEMIA: ICD-10-CM

## 2019-06-20 DIAGNOSIS — E78.5 HYPERLIPIDEMIA, UNSPECIFIED HYPERLIPIDEMIA TYPE: ICD-10-CM

## 2019-06-20 DIAGNOSIS — E78.2 MIXED HYPERLIPIDEMIA: ICD-10-CM

## 2019-06-20 LAB
ALBUMIN SERPL-MCNC: 4.4 G/DL (ref 3.5–5.2)
ALBUMIN UR-MCNC: <1.2 MG/L
ALBUMIN/GLOB SERPL: 1.8 G/DL
ALP SERPL-CCNC: 62 U/L (ref 39–117)
ALT SERPL W P-5'-P-CCNC: 13 U/L (ref 1–41)
ANION GAP SERPL CALCULATED.3IONS-SCNC: 14 MMOL/L
AST SERPL-CCNC: 16 U/L (ref 1–40)
BASOPHILS # BLD AUTO: 0.02 10*3/MM3 (ref 0–0.2)
BASOPHILS NFR BLD AUTO: 0.2 % (ref 0–1.5)
BILIRUB SERPL-MCNC: 0.5 MG/DL (ref 0.2–1.2)
BUN BLD-MCNC: 17 MG/DL (ref 8–23)
BUN/CREAT SERPL: 16 (ref 7–25)
CALCIUM SPEC-SCNC: 9.2 MG/DL (ref 8.6–10.5)
CHLORIDE SERPL-SCNC: 102 MMOL/L (ref 98–107)
CHOLEST SERPL-MCNC: 104 MG/DL (ref 0–200)
CO2 SERPL-SCNC: 25 MMOL/L (ref 22–29)
CREAT BLD-MCNC: 1.06 MG/DL (ref 0.76–1.27)
CREAT UR-MCNC: 159.5 MG/DL
DEPRECATED RDW RBC AUTO: 47.8 FL (ref 37–54)
EOSINOPHIL # BLD AUTO: 0.11 10*3/MM3 (ref 0–0.4)
EOSINOPHIL NFR BLD AUTO: 1 % (ref 0.3–6.2)
ERYTHROCYTE [DISTWIDTH] IN BLOOD BY AUTOMATED COUNT: 14.3 % (ref 12.3–15.4)
GFR SERPL CREATININE-BSD FRML MDRD: 69 ML/MIN/1.73
GLOBULIN UR ELPH-MCNC: 2.5 GM/DL
GLUCOSE BLD-MCNC: 62 MG/DL (ref 65–99)
HBA1C MFR BLD: 5.1 % (ref 4.8–5.6)
HCT VFR BLD AUTO: 41.8 % (ref 37.5–51)
HDLC SERPL-MCNC: 30 MG/DL (ref 40–60)
HGB BLD-MCNC: 14.2 G/DL (ref 13–17.7)
IMM GRANULOCYTES # BLD AUTO: 0.04 10*3/MM3 (ref 0–0.05)
IMM GRANULOCYTES NFR BLD AUTO: 0.4 % (ref 0–0.5)
LDLC SERPL CALC-MCNC: 45 MG/DL (ref 0–100)
LDLC/HDLC SERPL: 1.51 {RATIO}
LYMPHOCYTES # BLD AUTO: 1.57 10*3/MM3 (ref 0.7–3.1)
LYMPHOCYTES NFR BLD AUTO: 14.6 % (ref 19.6–45.3)
MCH RBC QN AUTO: 31.5 PG (ref 26.6–33)
MCHC RBC AUTO-ENTMCNC: 34 G/DL (ref 31.5–35.7)
MCV RBC AUTO: 92.7 FL (ref 79–97)
MICROALBUMIN/CREAT UR: NORMAL MG/G
MONOCYTES # BLD AUTO: 0.72 10*3/MM3 (ref 0.1–0.9)
MONOCYTES NFR BLD AUTO: 6.7 % (ref 5–12)
NEUTROPHILS # BLD AUTO: 8.33 10*3/MM3 (ref 1.7–7)
NEUTROPHILS NFR BLD AUTO: 77.1 % (ref 42.7–76)
NRBC BLD AUTO-RTO: 0 /100 WBC (ref 0–0.2)
PLATELET # BLD AUTO: 223 10*3/MM3 (ref 140–450)
PMV BLD AUTO: 11.8 FL (ref 6–12)
POTASSIUM BLD-SCNC: 4 MMOL/L (ref 3.5–5.2)
PROT SERPL-MCNC: 6.9 G/DL (ref 6–8.5)
PSA SERPL-MCNC: 0.66 NG/ML (ref 0–4)
RBC # BLD AUTO: 4.51 10*6/MM3 (ref 4.14–5.8)
SODIUM BLD-SCNC: 141 MMOL/L (ref 136–145)
TRIGL SERPL-MCNC: 143 MG/DL (ref 0–150)
VLDLC SERPL-MCNC: 28.6 MG/DL (ref 5–40)
WBC NRBC COR # BLD: 10.79 10*3/MM3 (ref 3.4–10.8)

## 2019-06-20 PROCEDURE — 83036 HEMOGLOBIN GLYCOSYLATED A1C: CPT | Performed by: NURSE PRACTITIONER

## 2019-06-20 PROCEDURE — 99214 OFFICE O/P EST MOD 30 MIN: CPT | Performed by: NURSE PRACTITIONER

## 2019-06-20 PROCEDURE — 82570 ASSAY OF URINE CREATININE: CPT | Performed by: NURSE PRACTITIONER

## 2019-06-20 PROCEDURE — 80053 COMPREHEN METABOLIC PANEL: CPT | Performed by: NURSE PRACTITIONER

## 2019-06-20 PROCEDURE — G0103 PSA SCREENING: HCPCS | Performed by: NURSE PRACTITIONER

## 2019-06-20 PROCEDURE — 82043 UR ALBUMIN QUANTITATIVE: CPT | Performed by: NURSE PRACTITIONER

## 2019-06-20 PROCEDURE — 80061 LIPID PANEL: CPT | Performed by: NURSE PRACTITIONER

## 2019-06-20 PROCEDURE — 85025 COMPLETE CBC W/AUTO DIFF WBC: CPT | Performed by: NURSE PRACTITIONER

## 2019-06-20 RX ORDER — GLIPIZIDE 5 MG/1
5 TABLET ORAL
Qty: 60 TABLET | Refills: 0 | Status: SHIPPED | OUTPATIENT
Start: 2019-06-20 | End: 2019-07-23 | Stop reason: SDUPTHER

## 2019-06-20 RX ORDER — PAROXETINE HYDROCHLORIDE HEMIHYDRATE 25 MG/1
25 TABLET, FILM COATED, EXTENDED RELEASE ORAL EVERY MORNING
Qty: 90 TABLET | Refills: 1 | Status: SHIPPED | OUTPATIENT
Start: 2019-06-20 | End: 2020-01-06 | Stop reason: DRUGHIGH

## 2019-06-20 RX ORDER — HYDRALAZINE HYDROCHLORIDE 25 MG/1
25 TABLET, FILM COATED ORAL 3 TIMES DAILY
Qty: 90 TABLET | Refills: 5 | Status: SHIPPED | OUTPATIENT
Start: 2019-06-20 | End: 2019-12-18 | Stop reason: SDUPTHER

## 2019-06-20 RX ORDER — ICOSAPENT ETHYL 1000 MG/1
2 CAPSULE ORAL 2 TIMES DAILY WITH MEALS
Qty: 120 CAPSULE | Refills: 2 | Status: SHIPPED | OUTPATIENT
Start: 2019-06-20 | End: 2019-09-22 | Stop reason: SDUPTHER

## 2019-06-20 RX ORDER — OMEPRAZOLE 40 MG/1
40 CAPSULE, DELAYED RELEASE ORAL DAILY
Qty: 90 CAPSULE | Refills: 2 | Status: SHIPPED | OUTPATIENT
Start: 2019-06-20 | End: 2019-12-02

## 2019-06-20 RX ORDER — LOSARTAN POTASSIUM AND HYDROCHLOROTHIAZIDE 25; 100 MG/1; MG/1
1 TABLET ORAL DAILY
Qty: 90 TABLET | Refills: 1 | Status: SHIPPED | OUTPATIENT
Start: 2019-06-20 | End: 2020-10-22

## 2019-06-20 RX ORDER — PRAVASTATIN SODIUM 40 MG
40 TABLET ORAL
Qty: 90 TABLET | Refills: 2 | Status: SHIPPED | OUTPATIENT
Start: 2019-06-20 | End: 2020-08-03

## 2019-06-20 NOTE — PROGRESS NOTES
Subjective   Hung Ramsay is a 68 y.o. male.  Three month follow-up.     Diabetes   He presents for his follow-up diabetic visit. He has type 2 diabetes mellitus. His disease course has been stable. Pertinent negatives for hypoglycemia include no confusion. Pertinent negatives for diabetes include no fatigue, no polydipsia, no polyphagia and no polyuria. There are no hypoglycemic complications. Symptoms are stable. There are no diabetic complications. Risk factors for coronary artery disease include diabetes mellitus, dyslipidemia, male sex, obesity, hypertension and family history. Current diabetic treatment includes oral agent (dual therapy). He is compliant with treatment all of the time. His weight is stable. He is following a generally healthy diet. Meal planning includes avoidance of concentrated sweets. There is no change in his home blood glucose trend. An ACE inhibitor/angiotensin II receptor blocker is being taken.   Hypertension   This is a new problem. The current episode started more than 1 month ago. The problem has been gradually improving since onset. The problem is controlled. There are no associated agents to hypertension. Risk factors for coronary artery disease include diabetes mellitus, dyslipidemia, family history, male gender, obesity, sedentary lifestyle and smoking/tobacco exposure. Current antihypertension treatment includes direct vasodilators. The current treatment provides moderate improvement. There are no compliance problems.    Hyperlipidemia   This is a chronic problem. The current episode started more than 1 year ago. Recent lipid tests were reviewed and are variable. Exacerbating diseases include diabetes. Factors aggravating his hyperlipidemia include fatty foods. Current antihyperlipidemic treatment includes statins. The current treatment provides mild improvement of lipids. There are no compliance problems.  Risk factors for coronary artery disease include diabetes  mellitus and hypertension.        The following portions of the patient's history were reviewed and updated as appropriate:     Current Outpatient Medications   Medication Sig Dispense Refill   • aspirin 81 MG EC tablet Take 81 mg by mouth Daily.     • diclofenac (VOLTAREN) 75 MG EC tablet Take 1 tablet by mouth 2 (Two) Times a Day As Needed (pain). 180 tablet 2   • Dulaglutide 0.75 MG/0.5ML solution pen-injector Inject 0.75 mg under the skin into the appropriate area as directed 1 (One) Time Per Week. 4 pen 12   • esomeprazole (nexIUM) 40 MG capsule TAKE 1 CAPSULE BY MOUTH EVERY DAY IN THE MORNING BEFORE BREAKFAST 90 capsule 1   • glipiZIDE (GLUCOTROL) 5 MG tablet Take 1 tablet by mouth 2 (Two) Times a Day Before Meals. 60 tablet 0   • hydrALAZINE (APRESOLINE) 25 MG tablet Take 1 tablet by mouth 3 (Three) Times a Day. 90 tablet 5   • icosapent ethyl (VASCEPA) 1 g capsule capsule Take 2 g by mouth 2 (Two) Times a Day With Meals. 120 capsule 2   • losartan-hydrochlorothiazide (HYZAAR) 100-25 MG per tablet Take 1 tablet by mouth Daily. 90 tablet 1   • metFORMIN (GLUCOPHAGE) 1000 MG tablet Take 1 tablet by mouth 2 (Two) Times a Day With Meals. 180 tablet 1   • omeprazole (priLOSEC) 40 MG capsule Take 1 capsule by mouth Daily. 90 capsule 2   • PARoxetine CR (PAXIL-CR) 25 MG 24 hr tablet Take 1 tablet by mouth Every Morning. 90 tablet 1   • pravastatin (PRAVACHOL) 40 MG tablet Take 1 tablet by mouth every night at bedtime. 90 tablet 2   • traZODone (DESYREL) 50 MG tablet TAKE 1 TABLET BY MOUTH EVERY DAY AT NIGHT 90 tablet 1     No current facility-administered medications for this visit.      Current Outpatient Medications on File Prior to Visit   Medication Sig   • aspirin 81 MG EC tablet Take 81 mg by mouth Daily.   • diclofenac (VOLTAREN) 75 MG EC tablet Take 1 tablet by mouth 2 (Two) Times a Day As Needed (pain).   • Dulaglutide 0.75 MG/0.5ML solution pen-injector Inject 0.75 mg under the skin into the appropriate  "area as directed 1 (One) Time Per Week.   • esomeprazole (nexIUM) 40 MG capsule TAKE 1 CAPSULE BY MOUTH EVERY DAY IN THE MORNING BEFORE BREAKFAST   • traZODone (DESYREL) 50 MG tablet TAKE 1 TABLET BY MOUTH EVERY DAY AT NIGHT   • [DISCONTINUED] glipiZIDE (GLUCOTROL) 5 MG tablet TAKE 1 TABLET BY MOUTH 2 (TWO) TIMES A DAY BEFORE MEALS   • [DISCONTINUED] hydrALAZINE (APRESOLINE) 25 MG tablet Take 1 tablet by mouth 3 (Three) Times a Day.   • [DISCONTINUED] icosapent ethyl (VASCEPA) 1 g capsule capsule Take 2 g by mouth 2 (Two) Times a Day With Meals.   • [DISCONTINUED] losartan-hydrochlorothiazide (HYZAAR) 100-25 MG per tablet Take 1 tablet by mouth Daily.   • [DISCONTINUED] metFORMIN (GLUCOPHAGE) 1000 MG tablet Take 1 tablet by mouth 2 (Two) Times a Day With Meals.   • [DISCONTINUED] omeprazole (priLOSEC) 40 MG capsule Take 1 capsule by mouth Daily.   • [DISCONTINUED] PARoxetine CR (PAXIL-CR) 25 MG 24 hr tablet TAKE 1 TABLET BY MOUTH EVERY DAY IN THE MORNING   • [DISCONTINUED] pravastatin (PRAVACHOL) 40 MG tablet TAKE 1 TABLET BY MOUTH EVERY DAY AT NIGHT     No current facility-administered medications on file prior to visit.      He has No Known Allergies..    Review of Systems   Constitutional: Negative.  Negative for chills, diaphoresis and fatigue.   HENT: Negative.    Eyes: Negative.    Respiratory: Negative.    Cardiovascular: Negative.    Endocrine: Negative for polydipsia, polyphagia and polyuria.   Genitourinary: Negative.    Musculoskeletal: Negative.    Skin: Negative.    Neurological: Negative.    Psychiatric/Behavioral: Negative.  Negative for confusion.       Objective    Visit Vitals  /74   Ht 170.2 cm (67\")   Wt 95.6 kg (210 lb 11.2 oz)   BMI 33.00 kg/m²       Physical Exam   Constitutional: He is oriented to person, place, and time. He appears well-developed and well-nourished.   HENT:   Head: Normocephalic.   Right Ear: External ear normal.   Left Ear: External ear normal.   Eyes: EOM are normal. " Pupils are equal, round, and reactive to light.   Neck: Normal range of motion. Neck supple.   Cardiovascular: Normal rate, regular rhythm and normal heart sounds.   Pulmonary/Chest: Effort normal and breath sounds normal.   Abdominal: Soft. Bowel sounds are normal.   Musculoskeletal: Normal range of motion.   Neurological: He is alert and oriented to person, place, and time.   Skin: Skin is warm. Capillary refill takes less than 2 seconds.   Psychiatric: He has a normal mood and affect. His behavior is normal.   Nursing note and vitals reviewed.      Assessment/Plan   Problems Addressed this Visit        Cardiovascular and Mediastinum    Essential hypertension - Primary    Relevant Medications    hydrALAZINE (APRESOLINE) 25 MG tablet    losartan-hydrochlorothiazide (HYZAAR) 100-25 MG per tablet    Other Relevant Orders    CBC & Differential    Comprehensive Metabolic Panel    Mixed hyperlipidemia    Relevant Medications    icosapent ethyl (VASCEPA) 1 g capsule capsule    pravastatin (PRAVACHOL) 40 MG tablet    Other Relevant Orders    Lipid panel       Endocrine    Diabetes (CMS/HCC)    Relevant Medications    glipiZIDE (GLUCOTROL) 5 MG tablet    metFORMIN (GLUCOPHAGE) 1000 MG tablet    Other Relevant Orders    Hemoglobin A1c    Microalbumin / Creatinine Urine Ratio - Urine, Clean Catch       Other    Anxiety    Relevant Medications    PARoxetine CR (PAXIL-CR) 25 MG 24 hr tablet      Other Visit Diagnoses     Screening for malignant neoplasm of prostate        Relevant Orders    PSA Screen    Hypertriglyceridemia        Relevant Medications    icosapent ethyl (VASCEPA) 1 g capsule capsule    pravastatin (PRAVACHOL) 40 MG tablet    Hyperlipidemia, unspecified hyperlipidemia type        Relevant Medications    icosapent ethyl (VASCEPA) 1 g capsule capsule    pravastatin (PRAVACHOL) 40 MG tablet        New Medications Ordered This Visit   Medications   • glipiZIDE (GLUCOTROL) 5 MG tablet     Sig: Take 1 tablet by  mouth 2 (Two) Times a Day Before Meals.     Dispense:  60 tablet     Refill:  0   • hydrALAZINE (APRESOLINE) 25 MG tablet     Sig: Take 1 tablet by mouth 3 (Three) Times a Day.     Dispense:  90 tablet     Refill:  5   • icosapent ethyl (VASCEPA) 1 g capsule capsule     Sig: Take 2 g by mouth 2 (Two) Times a Day With Meals.     Dispense:  120 capsule     Refill:  2   • losartan-hydrochlorothiazide (HYZAAR) 100-25 MG per tablet     Sig: Take 1 tablet by mouth Daily.     Dispense:  90 tablet     Refill:  1   • metFORMIN (GLUCOPHAGE) 1000 MG tablet     Sig: Take 1 tablet by mouth 2 (Two) Times a Day With Meals.     Dispense:  180 tablet     Refill:  1   • omeprazole (priLOSEC) 40 MG capsule     Sig: Take 1 capsule by mouth Daily.     Dispense:  90 capsule     Refill:  2   • PARoxetine CR (PAXIL-CR) 25 MG 24 hr tablet     Sig: Take 1 tablet by mouth Every Morning.     Dispense:  90 tablet     Refill:  1   • pravastatin (PRAVACHOL) 40 MG tablet     Sig: Take 1 tablet by mouth every night at bedtime.     Dispense:  90 tablet     Refill:  2       1. Essential hypertension:  Continue on Hyzaar as previously prescribed  Complete CBC and chemistry panel as ordered and will notify of results when available  Encouraged to adhere to low-sodium diet of no more than 2000 mg/day     2. Mixed hyperlipidemia:  Continue on Pravachol and vascepa as previously prescribed  Complete fasting lipid panel as ordered and will notify of results when available  Encouraged to adhere to low-fat diet  Encouraged to remain physically active through activities such as golfing to promote cardiovascular health     3.  Diabetes:  Continue on Glucophage and Glucotrol was previously prescribed  Complete hemoglobin A1c and microalbumin/creatinine urine as ordered and will notify of results when available  Encouraged to adhere to ADA diet  Educated on importance of thorough diabetic foot care and encouraged to check feet daily    4.  Screening for  malignant neoplasm of the prostate:  Complete PSA is ordered and will notify results when available    5.  Anxiety:  Continue on Paxil as previously prescribed and refill prescription sent to pharmacy  Educated on possible side of this medication including but not limited possible suicidal ideations  Encouraged to discontinue medication immediately if suicidal ideations occur and seek emergency medical treatment    Continue on current medications as previously prescribed   Return in about 6 months (around 12/22/2019) for Medicare Wellness.        This document has been electronically signed by GEMINI Red on June 20, 2019 8:46 AM

## 2019-06-24 ENCOUNTER — TELEPHONE (OUTPATIENT)
Dept: FAMILY MEDICINE CLINIC | Facility: CLINIC | Age: 69
End: 2019-06-24

## 2019-06-24 NOTE — PROGRESS NOTES
Per GEMINI Fuentes, GEMINI Laguerre has been called with recent lab results & recommendations.  Continue current medications and follow-up as planned or sooner if any problems.

## 2019-06-24 NOTE — TELEPHONE ENCOUNTER
Per GEMINI Fuentes,  GEMINI Ramsay has been called with recent lab results & recommendations.  Continue current medications and follow-up as planned or sooner if any problems.      ----- Message from GEMINI Red sent at 6/23/2019  6:01 PM CDT -----  Glucose was little on the low side so please make sure he is feeling OK.  All other labs were OK.  Thank you.

## 2019-07-23 DIAGNOSIS — E11.9 TYPE 2 DIABETES MELLITUS WITHOUT COMPLICATION, WITHOUT LONG-TERM CURRENT USE OF INSULIN (HCC): ICD-10-CM

## 2019-07-23 RX ORDER — GLIPIZIDE 5 MG/1
5 TABLET ORAL
Qty: 60 TABLET | Refills: 0 | Status: SHIPPED | OUTPATIENT
Start: 2019-07-23 | End: 2020-04-06

## 2019-08-15 RX ORDER — ESOMEPRAZOLE MAGNESIUM 40 MG/1
CAPSULE, DELAYED RELEASE ORAL
Qty: 30 CAPSULE | Refills: 5 | Status: SHIPPED | OUTPATIENT
Start: 2019-08-15 | End: 2020-03-03

## 2019-09-22 DIAGNOSIS — E78.1 HYPERTRIGLYCERIDEMIA: ICD-10-CM

## 2019-09-23 RX ORDER — ICOSAPENT ETHYL 1000 MG/1
CAPSULE ORAL
Qty: 120 CAPSULE | Refills: 2 | Status: SHIPPED | OUTPATIENT
Start: 2019-09-23 | End: 2019-12-12 | Stop reason: SDUPTHER

## 2019-10-25 DIAGNOSIS — G47.00 INSOMNIA, UNSPECIFIED TYPE: ICD-10-CM

## 2019-10-25 DIAGNOSIS — F41.9 ANXIETY: ICD-10-CM

## 2019-10-25 RX ORDER — TRAZODONE HYDROCHLORIDE 50 MG/1
TABLET ORAL
Qty: 90 TABLET | Refills: 1 | Status: SHIPPED | OUTPATIENT
Start: 2019-10-25 | End: 2020-05-04

## 2019-12-02 ENCOUNTER — TELEPHONE (OUTPATIENT)
Dept: FAMILY MEDICINE CLINIC | Facility: CLINIC | Age: 69
End: 2019-12-02

## 2019-12-02 NOTE — TELEPHONE ENCOUNTER
Pt called and said that [facility] told him to call office because he shouldn't be taking both of these meds. They are Esomeprazole and Omeprazole. Let him know at [phone].

## 2019-12-05 ENCOUNTER — OFFICE VISIT (OUTPATIENT)
Dept: FAMILY MEDICINE CLINIC | Facility: CLINIC | Age: 69
End: 2019-12-05

## 2019-12-05 ENCOUNTER — LAB (OUTPATIENT)
Dept: LAB | Facility: HOSPITAL | Age: 69
End: 2019-12-05

## 2019-12-05 VITALS
DIASTOLIC BLOOD PRESSURE: 80 MMHG | OXYGEN SATURATION: 99 % | SYSTOLIC BLOOD PRESSURE: 120 MMHG | HEART RATE: 71 BPM | HEIGHT: 67 IN | BODY MASS INDEX: 34.83 KG/M2 | WEIGHT: 221.9 LBS

## 2019-12-05 DIAGNOSIS — E11.9 TYPE 2 DIABETES MELLITUS WITHOUT COMPLICATION, WITHOUT LONG-TERM CURRENT USE OF INSULIN (HCC): ICD-10-CM

## 2019-12-05 DIAGNOSIS — N52.9 ERECTILE DYSFUNCTION, UNSPECIFIED ERECTILE DYSFUNCTION TYPE: Primary | ICD-10-CM

## 2019-12-05 DIAGNOSIS — L98.9 SKIN LESION: ICD-10-CM

## 2019-12-05 DIAGNOSIS — N52.9 ERECTILE DYSFUNCTION, UNSPECIFIED ERECTILE DYSFUNCTION TYPE: ICD-10-CM

## 2019-12-05 DIAGNOSIS — Z23 NEED FOR IMMUNIZATION AGAINST INFLUENZA: ICD-10-CM

## 2019-12-05 PROCEDURE — 81003 URINALYSIS AUTO W/O SCOPE: CPT

## 2019-12-05 PROCEDURE — 90686 IIV4 VACC NO PRSV 0.5 ML IM: CPT | Performed by: NURSE PRACTITIONER

## 2019-12-05 PROCEDURE — 84403 ASSAY OF TOTAL TESTOSTERONE: CPT | Performed by: NURSE PRACTITIONER

## 2019-12-05 PROCEDURE — 83036 HEMOGLOBIN GLYCOSYLATED A1C: CPT

## 2019-12-05 PROCEDURE — 85025 COMPLETE CBC W/AUTO DIFF WBC: CPT

## 2019-12-05 PROCEDURE — 99213 OFFICE O/P EST LOW 20 MIN: CPT | Performed by: NURSE PRACTITIONER

## 2019-12-05 PROCEDURE — 84402 ASSAY OF FREE TESTOSTERONE: CPT | Performed by: NURSE PRACTITIONER

## 2019-12-05 PROCEDURE — 80053 COMPREHEN METABOLIC PANEL: CPT

## 2019-12-05 PROCEDURE — 90471 IMMUNIZATION ADMIN: CPT | Performed by: NURSE PRACTITIONER

## 2019-12-05 RX ORDER — SILDENAFIL CITRATE 20 MG/1
TABLET ORAL
Qty: 30 TABLET | Refills: 5 | Status: SHIPPED | OUTPATIENT
Start: 2019-12-05 | End: 2020-09-16 | Stop reason: SDUPTHER

## 2019-12-06 ENCOUNTER — TELEPHONE (OUTPATIENT)
Dept: FAMILY MEDICINE CLINIC | Facility: CLINIC | Age: 69
End: 2019-12-06

## 2019-12-06 LAB
ALBUMIN SERPL-MCNC: 4.4 G/DL (ref 3.5–5.2)
ALBUMIN/GLOB SERPL: 1.4 G/DL
ALP SERPL-CCNC: 67 U/L (ref 39–117)
ALT SERPL W P-5'-P-CCNC: 29 U/L (ref 1–41)
ANION GAP SERPL CALCULATED.3IONS-SCNC: 13.1 MMOL/L (ref 5–15)
AST SERPL-CCNC: 12 U/L (ref 1–40)
BASOPHILS # BLD AUTO: 0.03 10*3/MM3 (ref 0–0.2)
BASOPHILS NFR BLD AUTO: 0.3 % (ref 0–1.5)
BILIRUB SERPL-MCNC: 0.3 MG/DL (ref 0.2–1.2)
BILIRUB UR QL STRIP: NEGATIVE
BUN BLD-MCNC: 17 MG/DL (ref 8–23)
BUN/CREAT SERPL: 14.2 (ref 7–25)
CALCIUM SPEC-SCNC: 9.5 MG/DL (ref 8.6–10.5)
CHLORIDE SERPL-SCNC: 102 MMOL/L (ref 98–107)
CLARITY UR: CLEAR
CO2 SERPL-SCNC: 25.9 MMOL/L (ref 22–29)
COLOR UR: YELLOW
CREAT BLD-MCNC: 1.2 MG/DL (ref 0.76–1.27)
DEPRECATED RDW RBC AUTO: 43.8 FL (ref 37–54)
EOSINOPHIL # BLD AUTO: 0.14 10*3/MM3 (ref 0–0.4)
EOSINOPHIL NFR BLD AUTO: 1.4 % (ref 0.3–6.2)
ERYTHROCYTE [DISTWIDTH] IN BLOOD BY AUTOMATED COUNT: 13.1 % (ref 12.3–15.4)
GFR SERPL CREATININE-BSD FRML MDRD: 60 ML/MIN/1.73
GLOBULIN UR ELPH-MCNC: 3.1 GM/DL
GLUCOSE BLD-MCNC: 98 MG/DL (ref 65–99)
GLUCOSE UR STRIP-MCNC: NEGATIVE MG/DL
HBA1C MFR BLD: 5.97 % (ref 4.8–5.6)
HCT VFR BLD AUTO: 42.7 % (ref 37.5–51)
HGB BLD-MCNC: 14.6 G/DL (ref 13–17.7)
HGB UR QL STRIP.AUTO: NEGATIVE
IMM GRANULOCYTES # BLD AUTO: 0.04 10*3/MM3 (ref 0–0.05)
IMM GRANULOCYTES NFR BLD AUTO: 0.4 % (ref 0–0.5)
KETONES UR QL STRIP: NEGATIVE
LEUKOCYTE ESTERASE UR QL STRIP.AUTO: NEGATIVE
LYMPHOCYTES # BLD AUTO: 2.98 10*3/MM3 (ref 0.7–3.1)
LYMPHOCYTES NFR BLD AUTO: 29.9 % (ref 19.6–45.3)
MCH RBC QN AUTO: 31.1 PG (ref 26.6–33)
MCHC RBC AUTO-ENTMCNC: 34.2 G/DL (ref 31.5–35.7)
MCV RBC AUTO: 90.9 FL (ref 79–97)
MONOCYTES # BLD AUTO: 0.85 10*3/MM3 (ref 0.1–0.9)
MONOCYTES NFR BLD AUTO: 8.5 % (ref 5–12)
NEUTROPHILS # BLD AUTO: 5.92 10*3/MM3 (ref 1.7–7)
NEUTROPHILS NFR BLD AUTO: 59.5 % (ref 42.7–76)
NITRITE UR QL STRIP: NEGATIVE
NRBC BLD AUTO-RTO: 0 /100 WBC (ref 0–0.2)
PH UR STRIP.AUTO: 7.5 [PH] (ref 5–8)
PLATELET # BLD AUTO: 219 10*3/MM3 (ref 140–450)
PMV BLD AUTO: 12.2 FL (ref 6–12)
POTASSIUM BLD-SCNC: 4.4 MMOL/L (ref 3.5–5.2)
PROT SERPL-MCNC: 7.5 G/DL (ref 6–8.5)
PROT UR QL STRIP: NEGATIVE
RBC # BLD AUTO: 4.7 10*6/MM3 (ref 4.14–5.8)
SODIUM BLD-SCNC: 141 MMOL/L (ref 136–145)
SP GR UR STRIP: 1.02 (ref 1–1.03)
UROBILINOGEN UR QL STRIP: NORMAL
WBC NRBC COR # BLD: 9.96 10*3/MM3 (ref 3.4–10.8)

## 2019-12-06 NOTE — PROGRESS NOTES
Per GEMINI Fuentes, Mr. Ramsay has been called with recent lab results & recommendations.  Continue current medications and follow-up as planned or sooner if any problems.

## 2019-12-06 NOTE — TELEPHONE ENCOUNTER
Per GEMINI Fuentes, Mr. Ramsay has been called with recent lab results & recommendations.  Continue current medications and follow-up as planned or sooner if any problems.      ----- Message from GEMINI Red sent at 12/6/2019  7:35 AM CST -----  Labs normal, please call or send card!

## 2019-12-08 DIAGNOSIS — R79.89 LOW TESTOSTERONE IN MALE: Primary | ICD-10-CM

## 2019-12-08 LAB
TESTOST FREE SERPL-MCNC: 5.6 PG/ML (ref 6.6–18.1)
TESTOST SERPL-MCNC: 188 NG/DL (ref 264–916)

## 2019-12-10 ENCOUNTER — TELEPHONE (OUTPATIENT)
Dept: FAMILY MEDICINE CLINIC | Facility: CLINIC | Age: 69
End: 2019-12-10

## 2019-12-10 NOTE — TELEPHONE ENCOUNTER
Per GEMINI Fuentes, Mr. Ramsay has been called with recent lab results & recommendations.  Continue current medications and follow-up as planned or sooner if any problems.      ----- Message from GEMINI Red sent at 12/8/2019  5:58 PM CST -----  Total and free testosterone were both low. I would like to repeat his testosterone levels in 3 months and if no improvement we can discuss treatment options.  Orders are in the computer.

## 2019-12-12 DIAGNOSIS — E78.1 HYPERTRIGLYCERIDEMIA: ICD-10-CM

## 2019-12-12 RX ORDER — ICOSAPENT ETHYL 1000 MG/1
CAPSULE ORAL
Qty: 120 CAPSULE | Refills: 2 | Status: SHIPPED | OUTPATIENT
Start: 2019-12-12 | End: 2020-04-06

## 2019-12-18 DIAGNOSIS — I10 ESSENTIAL HYPERTENSION: ICD-10-CM

## 2019-12-18 RX ORDER — HYDRALAZINE HYDROCHLORIDE 25 MG/1
TABLET, FILM COATED ORAL
Qty: 270 TABLET | Refills: 1 | Status: SHIPPED | OUTPATIENT
Start: 2019-12-18 | End: 2020-07-22

## 2020-01-06 ENCOUNTER — OFFICE VISIT (OUTPATIENT)
Dept: FAMILY MEDICINE CLINIC | Facility: CLINIC | Age: 70
End: 2020-01-06

## 2020-01-06 VITALS
WEIGHT: 217.3 LBS | BODY MASS INDEX: 34.11 KG/M2 | DIASTOLIC BLOOD PRESSURE: 68 MMHG | HEIGHT: 67 IN | SYSTOLIC BLOOD PRESSURE: 112 MMHG

## 2020-01-06 DIAGNOSIS — Z00.00 MEDICARE ANNUAL WELLNESS VISIT, SUBSEQUENT: Primary | ICD-10-CM

## 2020-01-06 DIAGNOSIS — F33.0 MILD EPISODE OF RECURRENT MAJOR DEPRESSIVE DISORDER (HCC): ICD-10-CM

## 2020-01-06 PROCEDURE — G0439 PPPS, SUBSEQ VISIT: HCPCS | Performed by: NURSE PRACTITIONER

## 2020-01-06 RX ORDER — PAROXETINE HYDROCHLORIDE HEMIHYDRATE 37.5 MG/1
37.5 TABLET, FILM COATED, EXTENDED RELEASE ORAL EVERY MORNING
Qty: 30 TABLET | Refills: 3 | Status: SHIPPED | OUTPATIENT
Start: 2020-01-06 | End: 2020-01-29 | Stop reason: SDUPTHER

## 2020-01-06 NOTE — PROGRESS NOTES
The ABCs of the Annual Wellness Visit  Subsequent Medicare Wellness Visit    Chief Complaint   Patient presents with   • Medicare Wellness-subsequent       Subjective   History of Present Illness:  Hung Ramsay is a 69 y.o. male who presents for a Subsequent Medicare Wellness Visit.    HEALTH RISK ASSESSMENT    Recent Hospitalizations:  No hospitalization(s) within the last year.    Current Medical Providers:  Patient Care Team:  Elvira Ramsay APRN as PCP - General (Nurse Practitioner)  Dontae Cha DPM as Consulting Physician (Podiatry)    Smoking Status:  Social History     Tobacco Use   Smoking Status Former Smoker   Smokeless Tobacco Former User   Tobacco Comment    quit 50 yrs ago       Alcohol Consumption:  Social History     Substance and Sexual Activity   Alcohol Use No       Depression Screen:   PHQ-2/PHQ-9 Depression Screening 1/6/2020   Little interest or pleasure in doing things 0   Feeling down, depressed, or hopeless 0   Trouble falling or staying asleep, or sleeping too much 0   Feeling tired or having little energy 0   Poor appetite or overeating 0   Feeling bad about yourself - or that you are a failure or have let yourself or your family down 0   Trouble concentrating on things, such as reading the newspaper or watching television 0   Moving or speaking so slowly that other people could have noticed. Or the opposite - being so fidgety or restless that you have been moving around a lot more than usual 0   Thoughts that you would be better off dead, or of hurting yourself in some way 0   Total Score 0   If you checked off any problems, how difficult have these problems made it for you to do your work, take care of things at home, or get along with other people? Not difficult at all       Fall Risk Screen:  STEADI Fall Risk Assessment has not been completed.    Health Habits and Functional and Cognitive Screening:  Functional & Cognitive Status 1/6/2020   Do you have difficulty preparing  food and eating? No   Do you have difficulty bathing yourself, getting dressed or grooming yourself? No   Do you have difficulty using the toilet? No   Do you have difficulty moving around from place to place? No   Do you have trouble with steps or getting out of a bed or a chair? No   Current Diet Unhealthy Diet   Dental Exam Unknown   Eye Exam Up to date   Exercise (times per week) 3 times per week   Current Exercise Activities Include No Regular Exercise   Do you need help using the phone?  No   Are you deaf or do you have serious difficulty hearing?  No   Do you need help with transportation? No   Do you need help shopping? No   Do you need help preparing meals?  No   Do you need help with housework?  No   Do you need help with laundry? No   Do you need help taking your medications? No   Do you need help managing money? No   Do you ever drive or ride in a car without wearing a seat belt? No   Have you felt unusual stress, anger or loneliness in the last month? Yes   Who do you live with? Child   If you need help, do you have trouble finding someone available to you? No   Have you been bothered in the last four weeks by sexual problems? Yes   Do you have difficulty concentrating, remembering or making decisions? No         Does the patient have evidence of cognitive impairment? No    Asprin use counseling:Taking ASA appropriately as indicated    Age-appropriate Screening Schedule:  Refer to the list below for future screening recommendations based on patient's age, sex and/or medical conditions. Orders for these recommended tests are listed in the plan section. The patient has been provided with a written plan.    Health Maintenance   Topic Date Due   • DIABETIC EYE EXAM  07/03/2018   • ZOSTER VACCINE (2 of 2) 01/06/2021 (Originally 8/28/2017)   • HEMOGLOBIN A1C  06/05/2020   • LIPID PANEL  06/20/2020   • URINE MICROALBUMIN  06/20/2020   • DIABETIC FOOT EXAM  01/06/2021   • COLONOSCOPY  05/08/2024   • TDAP/TD  VACCINES (3 - Td) 12/02/2028   • INFLUENZA VACCINE  Completed          The following portions of the patient's history were reviewed and updated as appropriate:   He  has a past medical history of Anxiety, Diabetes (CMS/HCC), Essential hypertension, GERD (gastroesophageal reflux disease), and Insomnia.  He does not have any pertinent problems on file.  He  has a past surgical history that includes Colectomy partial / total; endoscopy and colonoscopy (04/19/2012); and Colonoscopy (N/A, 5/8/2019).  His family history includes Cancer in his father; Diabetes in his mother; Heart disease in his mother; Hypertension in his mother.  He  reports that he has quit smoking. He has quit using smokeless tobacco. He reports that he does not drink alcohol or use drugs.  Current Outpatient Medications   Medication Sig Dispense Refill   • aspirin 81 MG EC tablet Take 81 mg by mouth Daily.     • diclofenac (VOLTAREN) 75 MG EC tablet Take 1 tablet by mouth 2 (Two) Times a Day As Needed (pain). 180 tablet 2   • Dulaglutide 0.75 MG/0.5ML solution pen-injector Inject 0.75 mg under the skin into the appropriate area as directed 1 (One) Time Per Week. 4 pen 12   • esomeprazole (nexIUM) 40 MG capsule TAKE 1 CAPSULE BY MOUTH EVERY DAY IN THE MORNING BEFORE BREAKFAST 30 capsule 5   • glipiZIDE (GLUCOTROL) 5 MG tablet TAKE 1 TABLET BY MOUTH 2 (TWO) TIMES A DAY BEFORE MEALS. 60 tablet 0   • hydrALAZINE (APRESOLINE) 25 MG tablet TAKE 1 TABLET BY MOUTH THREE TIMES A  tablet 1   • losartan-hydrochlorothiazide (HYZAAR) 100-25 MG per tablet Take 1 tablet by mouth Daily. 90 tablet 1   • metFORMIN (GLUCOPHAGE) 1000 MG tablet Take 1 tablet by mouth 2 (Two) Times a Day With Meals. 180 tablet 1   • pravastatin (PRAVACHOL) 40 MG tablet Take 1 tablet by mouth every night at bedtime. 90 tablet 2   • sildenafil (REVATIO) 20 MG tablet Use 1-5 tablets as needed for erectile dysfunction. Do not exceed 100mg in 24 hours. 30 tablet 5   • traZODone  (DESYREL) 50 MG tablet TAKE 1 TABLET BY MOUTH EVERY DAY AT NIGHT 90 tablet 1   • VASCEPA 1 g capsule capsule TAKE 2 CAPSULES BY MOUTH 2 TIMES A DAY WITH MEALS. 120 capsule 2   • PARoxetine CR (PAXIL CR) 37.5 MG 24 hr tablet Take 1 tablet by mouth Every Morning. 30 tablet 3     No current facility-administered medications for this visit.      Current Outpatient Medications on File Prior to Visit   Medication Sig   • aspirin 81 MG EC tablet Take 81 mg by mouth Daily.   • diclofenac (VOLTAREN) 75 MG EC tablet Take 1 tablet by mouth 2 (Two) Times a Day As Needed (pain).   • Dulaglutide 0.75 MG/0.5ML solution pen-injector Inject 0.75 mg under the skin into the appropriate area as directed 1 (One) Time Per Week.   • esomeprazole (nexIUM) 40 MG capsule TAKE 1 CAPSULE BY MOUTH EVERY DAY IN THE MORNING BEFORE BREAKFAST   • glipiZIDE (GLUCOTROL) 5 MG tablet TAKE 1 TABLET BY MOUTH 2 (TWO) TIMES A DAY BEFORE MEALS.   • hydrALAZINE (APRESOLINE) 25 MG tablet TAKE 1 TABLET BY MOUTH THREE TIMES A DAY   • losartan-hydrochlorothiazide (HYZAAR) 100-25 MG per tablet Take 1 tablet by mouth Daily.   • metFORMIN (GLUCOPHAGE) 1000 MG tablet Take 1 tablet by mouth 2 (Two) Times a Day With Meals.   • pravastatin (PRAVACHOL) 40 MG tablet Take 1 tablet by mouth every night at bedtime.   • sildenafil (REVATIO) 20 MG tablet Use 1-5 tablets as needed for erectile dysfunction. Do not exceed 100mg in 24 hours.   • traZODone (DESYREL) 50 MG tablet TAKE 1 TABLET BY MOUTH EVERY DAY AT NIGHT   • VASCEPA 1 g capsule capsule TAKE 2 CAPSULES BY MOUTH 2 TIMES A DAY WITH MEALS.   • [DISCONTINUED] PARoxetine CR (PAXIL-CR) 25 MG 24 hr tablet Take 1 tablet by mouth Every Morning.     No current facility-administered medications on file prior to visit.      He has No Known Allergies..    Outpatient Medications Prior to Visit   Medication Sig Dispense Refill   • aspirin 81 MG EC tablet Take 81 mg by mouth Daily.     • diclofenac (VOLTAREN) 75 MG EC tablet Take 1  tablet by mouth 2 (Two) Times a Day As Needed (pain). 180 tablet 2   • Dulaglutide 0.75 MG/0.5ML solution pen-injector Inject 0.75 mg under the skin into the appropriate area as directed 1 (One) Time Per Week. 4 pen 12   • esomeprazole (nexIUM) 40 MG capsule TAKE 1 CAPSULE BY MOUTH EVERY DAY IN THE MORNING BEFORE BREAKFAST 30 capsule 5   • glipiZIDE (GLUCOTROL) 5 MG tablet TAKE 1 TABLET BY MOUTH 2 (TWO) TIMES A DAY BEFORE MEALS. 60 tablet 0   • hydrALAZINE (APRESOLINE) 25 MG tablet TAKE 1 TABLET BY MOUTH THREE TIMES A  tablet 1   • losartan-hydrochlorothiazide (HYZAAR) 100-25 MG per tablet Take 1 tablet by mouth Daily. 90 tablet 1   • metFORMIN (GLUCOPHAGE) 1000 MG tablet Take 1 tablet by mouth 2 (Two) Times a Day With Meals. 180 tablet 1   • pravastatin (PRAVACHOL) 40 MG tablet Take 1 tablet by mouth every night at bedtime. 90 tablet 2   • sildenafil (REVATIO) 20 MG tablet Use 1-5 tablets as needed for erectile dysfunction. Do not exceed 100mg in 24 hours. 30 tablet 5   • traZODone (DESYREL) 50 MG tablet TAKE 1 TABLET BY MOUTH EVERY DAY AT NIGHT 90 tablet 1   • VASCEPA 1 g capsule capsule TAKE 2 CAPSULES BY MOUTH 2 TIMES A DAY WITH MEALS. 120 capsule 2   • PARoxetine CR (PAXIL-CR) 25 MG 24 hr tablet Take 1 tablet by mouth Every Morning. 90 tablet 1     No facility-administered medications prior to visit.        Patient Active Problem List   Diagnosis   • Essential hypertension   • Diabetes (CMS/HCC)   • GERD (gastroesophageal reflux disease)   • Insomnia   • Anxiety   • Mixed hyperlipidemia   • Precordial pain   • Abnormal EKG   • Left elbow pain   • Laceration of left elbow   • Cause of injury, fall   • Family history of GI malignancy       Advanced Care Planning:  Patient has an advance directive - a copy has not been provided. Have asked the patient to send this to us to add to record    Review of Systems    Compared to one year ago, the patient feels his physical health is the same.  Compared to one year  "ago, the patient feels his mental health is worse.    Reviewed chart for potential of high risk medication in the elderly: yes  Reviewed chart for potential of harmful drug interactions in the elderly:yes    Objective         Vitals:    01/06/20 0822   BP: 112/68   Weight: 98.6 kg (217 lb 4.8 oz)   Height: 170.2 cm (67\")       Body mass index is 34.03 kg/m².  Discussed the patient's BMI with him. The BMI is above average; BMI management plan is completed.    Physical Exam   Constitutional: He is oriented to person, place, and time. He appears well-developed and well-nourished.   HENT:   Head: Normocephalic.   Right Ear: External ear normal.   Left Ear: External ear normal.   Eyes: Pupils are equal, round, and reactive to light. EOM are normal.   Neck: Normal range of motion. Neck supple.   Cardiovascular: Normal rate, regular rhythm and normal heart sounds.   Pulmonary/Chest: Effort normal and breath sounds normal.   Abdominal: Soft. Bowel sounds are normal.   Musculoskeletal: Normal range of motion.   Neurological: He is alert and oriented to person, place, and time.   Skin: Skin is warm. Capillary refill takes less than 2 seconds.   Psychiatric: He has a normal mood and affect. His behavior is normal.   Nursing note and vitals reviewed.      Lab Results   Component Value Date    HGBA1C 5.97 (H) 12/05/2019        Assessment/Plan   Medicare Risks and Personalized Health Plan  CMS Preventative Services Quick Reference  Cardiovascular risk  Depression/Dysphoria  Fall Risk  Obesity/Overweight     The above risks/problems have been discussed with the patient.  Pertinent information has been shared with the patient in the After Visit Summary.  Follow up plans and orders are seen below in the Assessment/Plan Section.    Diagnoses and all orders for this visit:    1. Medicare annual wellness visit, subsequent (Primary)    2. Mild episode of recurrent major depressive disorder (CMS/[facility])  -     PARoxetine CR (PAXIL CR) 37.5 " MG 24 hr tablet; Take 1 tablet by mouth Every Morning.  Dispense: 30 tablet; Refill: 3      Follow Up:  Return in about 2 months (around 3/6/2020) for Recheck of depression.     An After Visit Summary and PPPS were given to the patient.        This document has been electronically signed by GEMINI Red on January 6, 2020 9:06 AM

## 2020-01-06 NOTE — PATIENT INSTRUCTIONS
Calorie Counting for Weight Loss  Calories are units of energy. Your body needs a certain amount of calories from food to keep you going throughout the day. When you eat more calories than your body needs, your body stores the extra calories as fat. When you eat fewer calories than your body needs, your body burns fat to get the energy it needs.  Calorie counting means keeping track of how many calories you eat and drink each day. Calorie counting can be helpful if you need to lose weight. If you make sure to eat fewer calories than your body needs, you should lose weight. Ask your health care provider what a healthy weight is for you.  For calorie counting to work, you will need to eat the right number of calories in a day in order to lose a healthy amount of weight per week. A dietitian can help you determine how many calories you need in a day and will give you suggestions on how to reach your calorie goal.  · A healthy amount of weight to lose per week is usually 1-2 lb (0.5-0.9 kg). This usually means that your daily calorie intake should be reduced by 500-750 calories.  · Eating 1,200 - 1,500 calories per day can help most women lose weight.  · Eating 1,500 - 1,800 calories per day can help most men lose weight.  What is my plan?  My goal is to have __________ calories per day.  If I have this many calories per day, I should lose around __________ pounds per week.  What do I need to know about calorie counting?  In order to meet your daily calorie goal, you will need to:  · Find out how many calories are in each food you would like to eat. Try to do this before you eat.  · Decide how much of the food you plan to eat.  · Write down what you ate and how many calories it had. Doing this is called keeping a food log.  To successfully lose weight, it is important to balance calorie counting with a healthy lifestyle that includes regular activity. Aim for 150 minutes of moderate exercise (such as walking) or 75  minutes of vigorous exercise (such as running) each week.  Where do I find calorie information?    The number of calories in a food can be found on a Nutrition Facts label. If a food does not have a Nutrition Facts label, try to look up the calories online or ask your dietitian for help.  Remember that calories are listed per serving. If you choose to have more than one serving of a food, you will have to multiply the calories per serving by the amount of servings you plan to eat. For example, the label on a package of bread might say that a serving size is 1 slice and that there are 90 calories in a serving. If you eat 1 slice, you will have eaten 90 calories. If you eat 2 slices, you will have eaten 180 calories.  How do I keep a food log?  Immediately after each meal, record the following information in your food log:  · What you ate. Don't forget to include toppings, sauces, and other extras on the food.  · How much you ate. This can be measured in cups, ounces, or number of items.  · How many calories each food and drink had.  · The total number of calories in the meal.  Keep your food log near you, such as in a small notebook in your pocket, or use a mobile daquan or website. Some programs will calculate calories for you and show you how many calories you have left for the day to meet your goal.  What are some calorie counting tips?    · Use your calories on foods and drinks that will fill you up and not leave you hungry:  ? Some examples of foods that fill you up are nuts and nut butters, vegetables, lean proteins, and high-fiber foods like whole grains. High-fiber foods are foods with more than 5 g fiber per serving.  ? Drinks such as sodas, specialty coffee drinks, alcohol, and juices have a lot of calories, yet do not fill you up.  · Eat nutritious foods and avoid empty calories. Empty calories are calories you get from foods or beverages that do not have many vitamins or protein, such as candy, sweets, and  "soda. It is better to have a nutritious high-calorie food (such as an avocado) than a food with few nutrients (such as a bag of chips).  · Know how many calories are in the foods you eat most often. This will help you calculate calorie counts faster.  · Pay attention to calories in drinks. Low-calorie drinks include water and unsweetened drinks.  · Pay attention to nutrition labels for \"low fat\" or \"fat free\" foods. These foods sometimes have the same amount of calories or more calories than the full fat versions. They also often have added sugar, starch, or salt, to make up for flavor that was removed with the fat.  · Find a way of tracking calories that works for you. Get creative. Try different apps or programs if writing down calories does not work for you.  What are some portion control tips?  · Know how many calories are in a serving. This will help you know how many servings of a certain food you can have.  · Use a measuring cup to measure serving sizes. You could also try weighing out portions on a kitchen scale. With time, you will be able to estimate serving sizes for some foods.  · Take some time to put servings of different foods on your favorite plates, bowls, and cups so you know what a serving looks like.  · Try not to eat straight from a bag or box. Doing this can lead to overeating. Put the amount you would like to eat in a cup or on a plate to make sure you are eating the right portion.  · Use smaller plates, glasses, and bowls to prevent overeating.  · Try not to multitask (for example, watch TV or use your computer) while eating. If it is time to eat, sit down at a table and enjoy your food. This will help you to know when you are full. It will also help you to be aware of what you are eating and how much you are eating.  What are tips for following this plan?  Reading food labels  · Check the calorie count compared to the serving size. The serving size may be smaller than what you are used to " "eating.  · Check the source of the calories. Make sure the food you are eating is high in vitamins and protein and low in saturated and trans fats.  Shopping  · Read nutrition labels while you shop. This will help you make healthy decisions before you decide to purchase your food.  · Make a grocery list and stick to it.  Cooking  · Try to cook your favorite foods in a healthier way. For example, try baking instead of frying.  · Use low-fat dairy products.  Meal planning  · Use more fruits and vegetables. Half of your plate should be fruits and vegetables.  · Include lean proteins like poultry and fish.  How do I count calories when eating out?  · Ask for smaller portion sizes.  · Consider sharing an entree and sides instead of getting your own entree.  · If you get your own entree, eat only half. Ask for a box at the beginning of your meal and put the rest of your entree in it so you are not tempted to eat it.  · If calories are listed on the menu, choose the lower calorie options.  · Choose dishes that include vegetables, fruits, whole grains, low-fat dairy products, and lean protein.  · Choose items that are boiled, broiled, grilled, or steamed. Stay away from items that are buttered, battered, fried, or served with cream sauce. Items labeled \"crispy\" are usually fried, unless stated otherwise.  · Choose water, low-fat milk, unsweetened iced tea, or other drinks without added sugar. If you want an alcoholic beverage, choose a lower calorie option such as a glass of wine or light beer.  · Ask for dressings, sauces, and syrups on the side. These are usually high in calories, so you should limit the amount you eat.  · If you want a salad, choose a garden salad and ask for grilled meats. Avoid extra toppings like stewart, cheese, or fried items. Ask for the dressing on the side, or ask for olive oil and vinegar or lemon to use as dressing.  · Estimate how many servings of a food you are given. For example, a serving of " cooked rice is ½ cup or about the size of half a baseball. Knowing serving sizes will help you be aware of how much food you are eating at restaurants. The list below tells you how big or small some common portion sizes are based on everyday objects:  ? 1 oz--4 stacked dice.  ? 3 oz--1 deck of cards.  ? 1 tsp--1 die.  ? 1 Tbsp--½ a ping-pong ball.  ? 2 Tbsp--1 ping-pong ball.  ? ½ cup--½ baseball.  ? 1 cup--1 baseball.  Summary  · Calorie counting means keeping track of how many calories you eat and drink each day. If you eat fewer calories than your body needs, you should lose weight.  · A healthy amount of weight to lose per week is usually 1-2 lb (0.5-0.9 kg). This usually means reducing your daily calorie intake by 500-750 calories.  · The number of calories in a food can be found on a Nutrition Facts label. If a food does not have a Nutrition Facts label, try to look up the calories online or ask your dietitian for help.  · Use your calories on foods and drinks that will fill you up, and not on foods and drinks that will leave you hungry.  · Use smaller plates, glasses, and bowls to prevent overeating.  This information is not intended to replace advice given to you by your health care provider. Make sure you discuss any questions you have with your health care provider.  Document Released: 12/18/2006 Document Revised: 09/06/2019 Document Reviewed: 11/17/2017  [employer] Interactive Patient Education © 2019 [employer]  Heart Disease Prevention  Heart disease is the leading cause of death in the world. Coronary artery disease is the most common cause of heart disease. This condition results when cholesterol and other substances (plaque) build up inside the walls of the blood vessels that supply your heart muscle (arteries). This buildup in arteries is called atherosclerosis. You can take actions to lower your risk of heart disease.  How can heart disease affect me?  Heart disease can cause many unpleasant  symptoms and complications, such as:  · Chest pain (angina).  · Reduced or blocked blood flow to your heart. This can cause:  ? Irregular heartbeats (arrhythmias).  ? Heart attack.  ? Heart failure.  What can increase my risk?  The following factors may make you more likely to develop this condition:  · High blood pressure (hypertension).  · High cholesterol.  · Smoking.  · A diet high in saturated fats or trans fats.  · Lack of physical activity.  · Obesity.  · Drinking too much alcohol.  · Diabetes.  · Having a family history of heart disease.  What actions can I take to prevent heart disease?  Nutrition    · Eat a heart-healthy eating plan as told by your health care provider. Examples include the DASH (Dietary Approaches to Stop Hypertension) eating plan or the Mediterranean diet.  · Generally, it is recommended that you:  ? Eat less salt (sodium). Ask your health care provider how much sodium is safe for you. Most people should have less than 2,300 mg each day.  ? Limit unhealthy fats, such as saturated and trans fats, in your diet. You can do this by eating low-fat dairy products, eating less red meat, and avoiding processed foods.  ? Eat healthy fats (omega-3 fatty acids). These are found in fish, such as mackerel or salmon.  ? Eat more fruits and vegetables. You should try to fill one-half of your plate with fruits and vegetables at each meal.  ? Eat more whole grains.  ? Avoid foods and drinks that have added sugars.  Lifestyle    · Get regular exercise. This is one of the most important things you can do for your health. Generally, it is recommended that you:  ? Exercise for at least 30 minutes on most days of the week (150 minutes each week). The exercise should increase your heart rate and make you sweat (aerobic exercise).  ? Add strength exercises on at least 2 days each week.  · Do not use any products that contain nicotine or tobacco, such as cigarettes and e-cigarettes. These can damage your heart  and blood vessels. If you need help quitting, ask your health care provider.  Alcohol use  · Do not drink alcohol if:  ? Your health care provider tells you not to drink.  ? You are pregnant, may be pregnant, or are planning to become pregnant.  · If you drink alcohol, limit how much you have:  ? 0-1 drink a day for women.  ? 0-2 drinks a day for men.  · Be aware of how much alcohol is in your drink. In the [place] one drink equals one typical bottle of beer (12 oz), one-half glass of wine (5 oz), or one shot of hard liquor (1½ oz).  Medicines  · Take over-the-counter and prescription medicines only as told by your health care provider.  · Ask your health care provider whether you should take an aspirin every day. Taking aspirin may help reduce your risk of heart disease and stroke.  · Depending on your risk factors, your health care provider may prescribe medicines to lower your risk of heart disease or to control related conditions. You may take medicine to:  ? Lower cholesterol.  ? Control blood pressure.  ? Control diabetes.  General information  · Keep your blood pressure under control, as recommended by your health care provider. For most healthy people, the upper number of your blood pressure (systolic) should be no higher than 120, and the lower number (diastolic) no higher than 80. Treatment may be needed if your blood pressure is higher than 130/80.  · Have your blood pressure checked at least every two years. Your health care provider may check your blood pressure more often if you have high blood pressure.  · After age 20, have your cholesterol checked every 4-6 years. If you have risk factors for heart disease, you may need to have it checked more frequently. Treatment may be needed if your cholesterol is high.  · Have your body mass index (BMI) checked every year. Your health care provider can calculate your BMI from your height and weight.  · Work with your health care provider to lose weight, if  needed, or to maintain a healthy weight.  Where to find more information:  · Centers for Disease Control and Prevention: www.cdc.gov/heartdisease  · American Heart Association: www.heart.org  ? Take a free online heart disease risk quiz to better understand your personal risk factors.  Summary  · Heart disease is the leading cause of death in the world.  · Heart disease can cause chest pain, abnormal heart rhythms, heart attack, and heart failure.  · High blood pressure, high cholesterol, and smoking are the main risk factors for heart disease, although other factors also contribute.  · You can take actions to lower your chances of developing heart disease. Work with your health care provider to reduce your risk by following a heart-healthy diet, being physically active, and controlling your weight, blood pressure, and cholesterol level.  This information is not intended to replace advice given to you by your health care provider. Make sure you discuss any questions you have with your health care provider.  Document Released: 08/01/2005 Document Revised: 01/02/2019 Document Reviewed: 01/02/2019  [employer] Interactive Patient Education © 2019 [employer]  Persistent Depressive Disorder    Persistent depressive disorder (PDD) is a mental health condition. PDD causes symptoms of low-level depression for 2 years or longer. It may also be called long-term (chronic) depression or dysthymia. PDD may include episodes of more severe depression that last for about 2 weeks (major depressive disorder or MDD).  PDD can affect the way you think, feel, and sleep. This condition may also affect your relationships. You may be more likely to get sick if you have PDD.  Symptoms of PDD occur for most of the day and may include:  · Feeling tired (fatigue).  · Low energy.  · Eating too much or too little.  · Sleeping too much or too little.  · Feeling restless or agitated.  · Feeling hopeless.  · Feeling worthless or guilty.  · Feeling  worried or nervous (anxiety).  · Trouble concentrating or making decisions.  · Low self-esteem.  · A negative way of looking at things (outlook).  · Not being able to have fun or feel pleasure.  · Avoiding interacting with people.  · Getting angry or annoyed easily (irritability).  · Acting aggressive or angry.  Follow these instructions at home:  Activity  · Go back to your normal activities as told by your doctor.  · Exercise regularly as told by your doctor.  General instructions  · Take over-the-counter and prescription medicines only as told by your doctor.  · Do not drink alcohol. Or, limit how much alcohol you drink to no more than 1 drink a day for nonpregnant women and 2 drinks a day for men. One drink equals 12 oz of beer, 5 oz of wine, or 1½ oz of hard liquor. Alcohol can affect any antidepressant medicines you are taking. Talk with your doctor about your alcohol use.  · Eat a healthy diet and get plenty of sleep.  · Find activities that you enjoy each day.  · Consider joining a support group. Your doctor may be able to suggest a support group.  · Keep all follow-up visits as told by your doctor. This is important.  Where to find more information  National [place] on Mental Illness  · www.andrew.org  [place] National [place] of Mental Health  · www.nimh.nih.gov  National Suicide Prevention Lifeline  · (1-819.923.4568).  · This is free, 24-hour help.  Contact a doctor if:  · Your symptoms get worse.  · You have new symptoms.  · You have trouble sleeping or doing your daily activities.  Get help right away if:  · You self-harm.  · You have serious thoughts about hurting yourself or others.  · You see, hear, taste, smell, or feel things that are not there (hallucinate).  This information is not intended to replace advice given to you by your health care provider. Make sure you discuss any questions you have with your health care provider.  Document Released: 11/28/2016 Document Revised: 08/11/2017 Document  Reviewed: 08/11/2017  [employer] Interactive Patient Education © 2019 [employer]  Fall Prevention in the Home, Adult  Falls can cause injuries. They can happen to people of all ages. There are many things you can do to make your home safe and to help prevent falls. Ask for help when making these changes, if needed.  What actions can I take to prevent falls?  General Instructions  · Use good lighting in all rooms. Replace any light bulbs that burn out.  · Turn on the lights when you go into a dark area. Use night-lights.  · Keep items that you use often in easy-to-reach places. Lower the shelves around your home if necessary.  · Set up your furniture so you have a clear path. Avoid moving your furniture around.  · Do not have throw rugs and other things on the floor that can make you trip.  · Avoid walking on wet floors.  · If any of your floors are uneven, fix them.  · Add color or contrast paint or tape to clearly todd and help you see:  ? Any grab bars or handrails.  ? First and last steps of stairways.  ? Where the edge of each step is.  · If you use a stepladder:  ? Make sure that it is fully opened. Do not climb a closed stepladder.  ? Make sure that both sides of the stepladder are locked into place.  ? Ask someone to hold the stepladder for you while you use it.  · If there are any pets around you, be aware of where they are.  What can I do in the bathroom?         · Keep the floor dry. Clean up any water that spills onto the floor as soon as it happens.  · Remove soap buildup in the tub or shower regularly.  · Use non-skid mats or decals on the floor of the tub or shower.  · Attach bath mats securely with double-sided, non-slip rug tape.  · If you need to sit down in the shower, use a plastic, non-slip stool.  · Install grab bars by the toilet and in the tub and shower. Do not use towel bars as grab bars.  What can I do in the bedroom?  · Make sure that you have a light by your bed that is easy to  reach.  · Do not use any sheets or blankets that are too big for your bed. They should not hang down onto the floor.  · Have a firm chair that has side arms. You can use this for support while you get dressed.  What can I do in the kitchen?  · Clean up any spills right away.  · If you need to reach something above you, use a strong step stool that has a grab bar.  · Keep electrical cords out of the way.  · Do not use floor polish or wax that makes floors slippery. If you must use wax, use non-skid floor wax.  What can I do with my stairs?  · Do not leave any items on the stairs.  · Make sure that you have a light switch at the top of the stairs and the bottom of the stairs. If you do not have them, ask someone to add them for you.  · Make sure that there are handrails on both sides of the stairs, and use them. Fix handrails that are broken or loose. Make sure that handrails are as long as the stairways.  · Install non-slip stair treads on all stairs in your home.  · Avoid having throw rugs at the top or bottom of the stairs. If you do have throw rugs, attach them to the floor with carpet tape.  · Choose a carpet that does not hide the edge of the steps on the stairway.  · Check any carpeting to make sure that it is firmly attached to the stairs. Fix any carpet that is loose or worn.  What can I do on the outside of my home?  · Use bright outdoor lighting.  · Regularly fix the edges of walkways and driveways and fix any cracks.  · Remove anything that might make you trip as you walk through a door, such as a raised step or threshold.  · Trim any bushes or trees on the path to your home.  · Regularly check to see if handrails are loose or broken. Make sure that both sides of any steps have handrails.  · Install guardrails along the edges of any raised decks and porches.  · Clear walking paths of anything that might make someone trip, such as tools or rocks.  · Have any leaves, snow, or ice cleared regularly.  · Use  sand or salt on walking paths during winter.  · Clean up any spills in your garage right away. This includes grease or oil spills.  What other actions can I take?  · Wear shoes that:  ? Have a low heel. Do not wear high heels.  ? Have rubber bottoms.  ? Are comfortable and fit you well.  ? Are closed at the toe. Do not wear open-toe sandals.  · Use tools that help you move around (mobility aids) if they are needed. These include:  ? Canes.  ? Walkers.  ? Scooters.  ? Crutches.  · Review your medicines with your doctor. Some medicines can make you feel dizzy. This can increase your chance of falling.  Ask your doctor what other things you can do to help prevent falls.  Where to find more information  · Centers for Disease Control and Prevention, EMILY: https://cdc.gov  · National [place] on Aging: https://ig0nine.ebonie.nih.gov  Contact a doctor if:  · You are afraid of falling at home.  · You feel weak, drowsy, or dizzy at home.  · You fall at home.  Summary  · There are many simple things that you can do to make your home safe and to help prevent falls.  · Ways to make your home safe include removing tripping hazards and installing grab bars in the bathroom.  · Ask for help when making these changes in your home.  This information is not intended to replace advice given to you by your health care provider. Make sure you discuss any questions you have with your health care provider.  Document Released: 10/14/2010 Document Revised: 08/02/2018 Document Reviewed: 08/02/2018  [employer] Interactive Patient Education © 2019 [employer]

## 2020-01-29 DIAGNOSIS — F33.0 MILD EPISODE OF RECURRENT MAJOR DEPRESSIVE DISORDER (HCC): ICD-10-CM

## 2020-01-29 RX ORDER — PAROXETINE HYDROCHLORIDE HEMIHYDRATE 37.5 MG/1
37.5 TABLET, FILM COATED, EXTENDED RELEASE ORAL EVERY MORNING
Qty: 90 TABLET | Refills: 3 | Status: SHIPPED | OUTPATIENT
Start: 2020-01-29 | End: 2020-11-12

## 2020-03-03 RX ORDER — ESOMEPRAZOLE MAGNESIUM 40 MG/1
CAPSULE, DELAYED RELEASE ORAL
Qty: 30 CAPSULE | Refills: 5 | Status: SHIPPED | OUTPATIENT
Start: 2020-03-03 | End: 2020-08-11

## 2020-03-04 ENCOUNTER — LAB (OUTPATIENT)
Dept: LAB | Facility: HOSPITAL | Age: 70
End: 2020-03-04

## 2020-03-04 DIAGNOSIS — R79.89 LOW TESTOSTERONE IN MALE: ICD-10-CM

## 2020-03-04 PROCEDURE — 84402 ASSAY OF FREE TESTOSTERONE: CPT

## 2020-03-04 PROCEDURE — 84403 ASSAY OF TOTAL TESTOSTERONE: CPT

## 2020-03-05 ENCOUNTER — OFFICE VISIT (OUTPATIENT)
Dept: FAMILY MEDICINE CLINIC | Facility: CLINIC | Age: 70
End: 2020-03-05

## 2020-03-05 ENCOUNTER — LAB (OUTPATIENT)
Dept: LAB | Facility: HOSPITAL | Age: 70
End: 2020-03-05

## 2020-03-05 VITALS
SYSTOLIC BLOOD PRESSURE: 132 MMHG | DIASTOLIC BLOOD PRESSURE: 78 MMHG | WEIGHT: 220.9 LBS | BODY MASS INDEX: 34.67 KG/M2 | HEIGHT: 67 IN

## 2020-03-05 DIAGNOSIS — N52.9 ERECTILE DYSFUNCTION, UNSPECIFIED ERECTILE DYSFUNCTION TYPE: ICD-10-CM

## 2020-03-05 DIAGNOSIS — Z12.5 ENCOUNTER FOR SCREENING FOR MALIGNANT NEOPLASM OF PROSTATE: ICD-10-CM

## 2020-03-05 DIAGNOSIS — Z79.899 HIGH RISK MEDICATION USE: ICD-10-CM

## 2020-03-05 DIAGNOSIS — N52.9 ERECTILE DYSFUNCTION, UNSPECIFIED ERECTILE DYSFUNCTION TYPE: Primary | ICD-10-CM

## 2020-03-05 DIAGNOSIS — F33.0 MILD EPISODE OF RECURRENT MAJOR DEPRESSIVE DISORDER (HCC): Primary | ICD-10-CM

## 2020-03-05 LAB
AMPHET+METHAMPHET UR QL: NEGATIVE
AMPHETAMINES UR QL: NEGATIVE
BARBITURATES UR QL SCN: NEGATIVE
BENZODIAZ UR QL SCN: NEGATIVE
BUPRENORPHINE SERPL-MCNC: NEGATIVE NG/ML
CANNABINOIDS SERPL QL: NEGATIVE
COCAINE UR QL: NEGATIVE
METHADONE UR QL SCN: NEGATIVE
OPIATES UR QL: NEGATIVE
OXYCODONE UR QL SCN: NEGATIVE
PCP UR QL SCN: NEGATIVE
PROPOXYPH UR QL: NEGATIVE
PSA SERPL-MCNC: 0.77 NG/ML (ref 0–4)
TRICYCLICS UR QL SCN: NEGATIVE

## 2020-03-05 PROCEDURE — 80306 DRUG TEST PRSMV INSTRMNT: CPT

## 2020-03-05 PROCEDURE — 99214 OFFICE O/P EST MOD 30 MIN: CPT | Performed by: NURSE PRACTITIONER

## 2020-03-05 PROCEDURE — 96372 THER/PROPH/DIAG INJ SC/IM: CPT | Performed by: NURSE PRACTITIONER

## 2020-03-05 PROCEDURE — G0103 PSA SCREENING: HCPCS

## 2020-03-05 RX ORDER — TESTOSTERONE CYPIONATE 200 MG/ML
400 INJECTION, SOLUTION INTRAMUSCULAR
Status: DISCONTINUED | OUTPATIENT
Start: 2020-03-05 | End: 2021-12-23

## 2020-03-05 RX ORDER — TESTOSTERONE CYPIONATE 200 MG/ML
400 INJECTION, SOLUTION INTRAMUSCULAR
Qty: 2 ML | Refills: 0 | Status: SHIPPED | OUTPATIENT
Start: 2020-03-05 | End: 2020-04-03

## 2020-03-05 RX ADMIN — TESTOSTERONE CYPIONATE 400 MG: 200 INJECTION, SOLUTION INTRAMUSCULAR at 14:23

## 2020-03-05 NOTE — PROGRESS NOTES
"Subjective   Hung Ramsay is a 69 y.o. male.  4 week rechekc for depression.  Placed on Paxil at previous visit.  \"I am doing good.  I feel really good on this medication.\"  He needs to take trazodone to help him sleep at night.  \"I am sleeping really good to.\"  Only concern now is erectile dysfunction.  Problem has been going on for several years.  \"I just have a really hard time getting an erection.\"  Was given prescription for sildenafil at previous visit.  \"I took 3 of those pills and it did not help.\"    Depression   Visit Type: follow-up  Patient presents with the following symptoms: depressed mood and nervousness/anxiety.  Patient is not experiencing: feelings of hopelessness, feelings of worthlessness, palpitations and panic.  Frequency of symptoms: occasionally   Severity: mild   Sleep per night: 8 hours  Sleep quality: good  Nighttime awakenings: occasional  Compliance with medications:  %        Erectile Dysfunction   This is a chronic problem. The current episode started more than 1 year ago. The problem has been gradually worsening since onset. The nature of his difficulty is achieving erection. Non-physiologic factors contributing to erectile dysfunction are anxiety. Irritative symptoms include frequency. Obstructive symptoms include an intermittent stream, a slower stream, straining and a weak stream. The symptoms are aggravated by poor sleep and stress. Past treatments include sildenafil. The treatment provided no relief.        The following portions of the patient's history were reviewed and updated as appropriate:   Current Outpatient Medications   Medication Sig Dispense Refill   • aspirin 81 MG EC tablet Take 81 mg by mouth Daily.     • diclofenac (VOLTAREN) 75 MG EC tablet Take 1 tablet by mouth 2 (Two) Times a Day As Needed (pain). 180 tablet 2   • Dulaglutide 0.75 MG/0.5ML solution pen-injector Inject 0.75 mg under the skin into the appropriate area as directed 1 (One) Time Per " Week. 4 pen 12   • esomeprazole (nexIUM) 40 MG capsule TAKE 1 CAPSULE BY MOUTH EVERY DAY IN THE MORNING BEFORE BREAKFAST 30 capsule 5   • glipiZIDE (GLUCOTROL) 5 MG tablet TAKE 1 TABLET BY MOUTH 2 (TWO) TIMES A DAY BEFORE MEALS. 60 tablet 0   • hydrALAZINE (APRESOLINE) 25 MG tablet TAKE 1 TABLET BY MOUTH THREE TIMES A  tablet 1   • losartan-hydrochlorothiazide (HYZAAR) 100-25 MG per tablet Take 1 tablet by mouth Daily. 90 tablet 1   • metFORMIN (GLUCOPHAGE) 1000 MG tablet Take 1 tablet by mouth 2 (Two) Times a Day With Meals. 180 tablet 1   • PARoxetine CR (PAXIL CR) 37.5 MG 24 hr tablet Take 1 tablet by mouth Every Morning. 90 tablet 3   • pravastatin (PRAVACHOL) 40 MG tablet Take 1 tablet by mouth every night at bedtime. 90 tablet 2   • sildenafil (REVATIO) 20 MG tablet Use 1-5 tablets as needed for erectile dysfunction. Do not exceed 100mg in 24 hours. 30 tablet 5   • traZODone (DESYREL) 50 MG tablet TAKE 1 TABLET BY MOUTH EVERY DAY AT NIGHT 90 tablet 1   • VASCEPA 1 g capsule capsule TAKE 2 CAPSULES BY MOUTH 2 TIMES A DAY WITH MEALS. 120 capsule 2   • Testosterone Cypionate (DEPOTESTOTERONE CYPIONATE) 200 MG/ML injection Inject 2 mL into the appropriate muscle as directed by prescriber Every 28 (Twenty-Eight) Days. 2 mL 0     No current facility-administered medications for this visit.      Current Outpatient Medications on File Prior to Visit   Medication Sig   • aspirin 81 MG EC tablet Take 81 mg by mouth Daily.   • diclofenac (VOLTAREN) 75 MG EC tablet Take 1 tablet by mouth 2 (Two) Times a Day As Needed (pain).   • Dulaglutide 0.75 MG/0.5ML solution pen-injector Inject 0.75 mg under the skin into the appropriate area as directed 1 (One) Time Per Week.   • esomeprazole (nexIUM) 40 MG capsule TAKE 1 CAPSULE BY MOUTH EVERY DAY IN THE MORNING BEFORE BREAKFAST   • glipiZIDE (GLUCOTROL) 5 MG tablet TAKE 1 TABLET BY MOUTH 2 (TWO) TIMES A DAY BEFORE MEALS.   • hydrALAZINE (APRESOLINE) 25 MG tablet TAKE 1  "TABLET BY MOUTH THREE TIMES A DAY   • losartan-hydrochlorothiazide (HYZAAR) 100-25 MG per tablet Take 1 tablet by mouth Daily.   • metFORMIN (GLUCOPHAGE) 1000 MG tablet Take 1 tablet by mouth 2 (Two) Times a Day With Meals.   • PARoxetine CR (PAXIL CR) 37.5 MG 24 hr tablet Take 1 tablet by mouth Every Morning.   • pravastatin (PRAVACHOL) 40 MG tablet Take 1 tablet by mouth every night at bedtime.   • sildenafil (REVATIO) 20 MG tablet Use 1-5 tablets as needed for erectile dysfunction. Do not exceed 100mg in 24 hours.   • traZODone (DESYREL) 50 MG tablet TAKE 1 TABLET BY MOUTH EVERY DAY AT NIGHT   • VASCEPA 1 g capsule capsule TAKE 2 CAPSULES BY MOUTH 2 TIMES A DAY WITH MEALS.     No current facility-administered medications on file prior to visit.      He has No Known Allergies..    Review of Systems   Constitutional: Negative.    Cardiovascular: Negative.  Negative for palpitations.   Gastrointestinal: Negative.    Genitourinary: Positive for frequency.   Musculoskeletal: Negative.    Skin: Negative.    Neurological: Negative.    Psychiatric/Behavioral: The patient is nervous/anxious.        Objective    Visit Vitals  /78 Comment: After sitting for 10 minutes   Ht 170.2 cm (67\")   Wt 100 kg (220 lb 14.4 oz)   BMI 34.60 kg/m²       Physical Exam   Constitutional: He is oriented to person, place, and time. He appears well-developed and well-nourished.   HENT:   Head: Normocephalic.   Cardiovascular: Normal rate, regular rhythm and normal heart sounds.   Pulmonary/Chest: Effort normal and breath sounds normal.   Abdominal: Soft. Bowel sounds are normal.   Musculoskeletal: Normal range of motion.   Neurological: He is alert and oriented to person, place, and time.   Skin: Skin is warm. Capillary refill takes less than 2 seconds.   Psychiatric: He has a normal mood and affect. His behavior is normal.   Nursing note and vitals reviewed.      Assessment/Plan   Problems Addressed this Visit     None      Visit " Diagnoses     Mild episode of recurrent major depressive disorder (CMS/HCC)    -  Primary    Erectile dysfunction, unspecified erectile dysfunction type        Relevant Medications    Testosterone Cypionate (DEPOTESTOTERONE CYPIONATE) 200 MG/ML injection    Other Relevant Orders    PSA Screen    High risk medication use        Relevant Orders    Urine Drug Screen - Urine, Clean Catch    PSA Screen    Encounter for screening for malignant neoplasm of prostate         Relevant Orders    PSA Screen        New Medications Ordered This Visit   Medications   • Testosterone Cypionate (DEPOTESTOTERONE CYPIONATE) 200 MG/ML injection     Sig: Inject 2 mL into the appropriate muscle as directed by prescriber Every 28 (Twenty-Eight) Days.     Dispense:  2 mL     Refill:  0       1.  Mild episode of recurrent major depressive disorder:  Continue on Paxil as previously prescribed  Educated on possible side effects of this medication including but not limited to increased risk for worsening of erectile dysfunction, depressing moods or thoughts of self-harm  Strongly encouraged to contact this office for any new or worsening changes in mood but to seek emergency medical treatment for thoughts of self-harm  Discussed at length how he believes his depression could improve his erectile dysfunction improved    2.  Erectile dysfunction  Checked free and total testosterone levels but results are not back yet  Complete PSA as ordered and will notify results when available  Begin testosterone cypionate as prescribed  Educated on possible side effects of this medication including but not limited to increased risk for acne, hirsutism, elevated liver enzymes and male pattern baldness  Informed that if no improvement or worsening of urinary symptoms at next appointment will refer to urology for further evaluation of prostate    3.  High risk medication use:  STEVE reviewed by me and will be scanned into medical record  Controlled substance  contract signed and dated and will be scanned into medical record  Complete urine drug screen as ordered     4.  Encounter for screening for malignant neoplasm of the prostate:  Complete PSA as ordered and will notify results when available    Continue on current medications as previously prescribed   I spent 24 minutes in direct face to face contact with patient.  Greater than 50% of this time was spent counseling patient and discussing plan of care.  Return in about 4 weeks (around 4/2/2020) for Recheck.        This document has been electronically signed by GEMINI Red on March 5, 2020 9:35 AM

## 2020-03-07 LAB
TESTOST FREE SERPL-MCNC: 5.2 PG/ML (ref 6.6–18.1)
TESTOST SERPL-MCNC: 224 NG/DL (ref 264–916)

## 2020-03-09 ENCOUNTER — TELEPHONE (OUTPATIENT)
Dept: FAMILY MEDICINE CLINIC | Facility: CLINIC | Age: 70
End: 2020-03-09

## 2020-03-09 NOTE — TELEPHONE ENCOUNTER
Per GEMINI Fuentes, Mr. Ramsay has been called with recent lab results & recommendations.  Continue current medications and follow-up as planned or sooner if any problems.      ----- Message from GEMINI Red sent at 3/8/2020  4:34 PM CDT -----  Testosterone levels are low.  I have started him on testosterone injects.

## 2020-03-24 ENCOUNTER — APPOINTMENT (OUTPATIENT)
Dept: CT IMAGING | Facility: HOSPITAL | Age: 70
End: 2020-03-24

## 2020-03-24 ENCOUNTER — HOSPITAL ENCOUNTER (EMERGENCY)
Facility: HOSPITAL | Age: 70
Discharge: HOME | End: 2020-03-24
Attending: EMERGENCY MEDICINE | Admitting: EMERGENCY MEDICINE

## 2020-03-24 VITALS
SYSTOLIC BLOOD PRESSURE: 151 MMHG | WEIGHT: 224.5 LBS | TEMPERATURE: 98.5 F | OXYGEN SATURATION: 96 % | RESPIRATION RATE: 18 BRPM | HEIGHT: 67 IN | HEART RATE: 76 BPM | DIASTOLIC BLOOD PRESSURE: 74 MMHG | BODY MASS INDEX: 35.24 KG/M2

## 2020-03-24 DIAGNOSIS — R10.9 ABDOMINAL PAIN, UNSPECIFIED ABDOMINAL LOCATION: Primary | ICD-10-CM

## 2020-03-24 LAB
ALBUMIN SERPL-MCNC: 4.5 G/DL (ref 3.5–5.2)
ALBUMIN/GLOB SERPL: 1.4 G/DL
ALP SERPL-CCNC: 64 U/L (ref 39–117)
ALT SERPL W P-5'-P-CCNC: 11 U/L (ref 1–41)
ANION GAP SERPL CALCULATED.3IONS-SCNC: 16 MMOL/L (ref 5–15)
AST SERPL-CCNC: 12 U/L (ref 1–40)
BASOPHILS # BLD AUTO: 0.02 10*3/MM3 (ref 0–0.2)
BASOPHILS NFR BLD AUTO: 0.2 % (ref 0–1.5)
BILIRUB SERPL-MCNC: 0.5 MG/DL (ref 0.2–1.2)
BILIRUB UR QL STRIP: NEGATIVE
BUN BLD-MCNC: 17 MG/DL (ref 8–23)
BUN/CREAT SERPL: 13.4 (ref 7–25)
CALCIUM SPEC-SCNC: 9.1 MG/DL (ref 8.6–10.5)
CHLORIDE SERPL-SCNC: 98 MMOL/L (ref 98–107)
CLARITY UR: CLEAR
CO2 SERPL-SCNC: 24 MMOL/L (ref 22–29)
COLOR UR: YELLOW
CREAT BLD-MCNC: 1.27 MG/DL (ref 0.76–1.27)
DEPRECATED RDW RBC AUTO: 47.8 FL (ref 37–54)
EOSINOPHIL # BLD AUTO: 0.08 10*3/MM3 (ref 0–0.4)
EOSINOPHIL NFR BLD AUTO: 0.7 % (ref 0.3–6.2)
ERYTHROCYTE [DISTWIDTH] IN BLOOD BY AUTOMATED COUNT: 14.5 % (ref 12.3–15.4)
GFR SERPL CREATININE-BSD FRML MDRD: 56 ML/MIN/1.73
GLOBULIN UR ELPH-MCNC: 3.2 GM/DL
GLUCOSE BLD-MCNC: 152 MG/DL (ref 65–99)
GLUCOSE UR STRIP-MCNC: NEGATIVE MG/DL
HCT VFR BLD AUTO: 43.5 % (ref 37.5–51)
HGB BLD-MCNC: 15.2 G/DL (ref 13–17.7)
HGB UR QL STRIP.AUTO: NEGATIVE
HOLD SPECIMEN: NORMAL
IMM GRANULOCYTES # BLD AUTO: 0.07 10*3/MM3 (ref 0–0.05)
IMM GRANULOCYTES NFR BLD AUTO: 0.6 % (ref 0–0.5)
KETONES UR QL STRIP: NEGATIVE
LEUKOCYTE ESTERASE UR QL STRIP.AUTO: NEGATIVE
LIPASE SERPL-CCNC: 23 U/L (ref 13–60)
LYMPHOCYTES # BLD AUTO: 2.48 10*3/MM3 (ref 0.7–3.1)
LYMPHOCYTES NFR BLD AUTO: 21.2 % (ref 19.6–45.3)
MCH RBC QN AUTO: 31.9 PG (ref 26.6–33)
MCHC RBC AUTO-ENTMCNC: 34.9 G/DL (ref 31.5–35.7)
MCV RBC AUTO: 91.4 FL (ref 79–97)
MONOCYTES # BLD AUTO: 0.74 10*3/MM3 (ref 0.1–0.9)
MONOCYTES NFR BLD AUTO: 6.3 % (ref 5–12)
NEUTROPHILS # BLD AUTO: 8.32 10*3/MM3 (ref 1.7–7)
NEUTROPHILS NFR BLD AUTO: 71 % (ref 42.7–76)
NITRITE UR QL STRIP: NEGATIVE
NRBC BLD AUTO-RTO: 0 /100 WBC (ref 0–0.2)
PH UR STRIP.AUTO: 5.5 [PH] (ref 5–9)
PLATELET # BLD AUTO: 218 10*3/MM3 (ref 140–450)
PMV BLD AUTO: 11.1 FL (ref 6–12)
POTASSIUM BLD-SCNC: 3.9 MMOL/L (ref 3.5–5.2)
PROT SERPL-MCNC: 7.7 G/DL (ref 6–8.5)
PROT UR QL STRIP: NEGATIVE
RBC # BLD AUTO: 4.76 10*6/MM3 (ref 4.14–5.8)
SODIUM BLD-SCNC: 138 MMOL/L (ref 136–145)
SP GR UR STRIP: 1.03 (ref 1–1.03)
UROBILINOGEN UR QL STRIP: NORMAL
WBC NRBC COR # BLD: 11.71 10*3/MM3 (ref 3.4–10.8)
WHOLE BLOOD HOLD SPECIMEN: NORMAL

## 2020-03-24 PROCEDURE — 99283 EMERGENCY DEPT VISIT LOW MDM: CPT

## 2020-03-24 PROCEDURE — 36415 COLL VENOUS BLD VENIPUNCTURE: CPT

## 2020-03-24 PROCEDURE — 85025 COMPLETE CBC W/AUTO DIFF WBC: CPT | Performed by: PHYSICIAN ASSISTANT

## 2020-03-24 PROCEDURE — 25010000002 IOPAMIDOL 61 % SOLUTION: Performed by: EMERGENCY MEDICINE

## 2020-03-24 PROCEDURE — 74177 CT ABD & PELVIS W/CONTRAST: CPT

## 2020-03-24 PROCEDURE — 25010000002 LORAZEPAM PER 2 MG

## 2020-03-24 PROCEDURE — 96374 THER/PROPH/DIAG INJ IV PUSH: CPT

## 2020-03-24 PROCEDURE — 80053 COMPREHEN METABOLIC PANEL: CPT | Performed by: PHYSICIAN ASSISTANT

## 2020-03-24 PROCEDURE — 81003 URINALYSIS AUTO W/O SCOPE: CPT | Performed by: PHYSICIAN ASSISTANT

## 2020-03-24 PROCEDURE — 83690 ASSAY OF LIPASE: CPT | Performed by: PHYSICIAN ASSISTANT

## 2020-03-24 RX ORDER — LORAZEPAM 2 MG/ML
2 INJECTION INTRAMUSCULAR ONCE
Status: COMPLETED | OUTPATIENT
Start: 2020-03-24 | End: 2020-03-24

## 2020-03-24 RX ORDER — SODIUM CHLORIDE 0.9 % (FLUSH) 0.9 %
10 SYRINGE (ML) INJECTION AS NEEDED
Status: DISCONTINUED | OUTPATIENT
Start: 2020-03-24 | End: 2020-03-24 | Stop reason: HOSPADM

## 2020-03-24 RX ORDER — LORAZEPAM 2 MG/ML
INJECTION INTRAMUSCULAR
Status: COMPLETED
Start: 2020-03-24 | End: 2020-03-24

## 2020-03-24 RX ADMIN — IOPAMIDOL 94 ML: 612 INJECTION, SOLUTION INTRAVENOUS at 19:34

## 2020-03-24 RX ADMIN — LORAZEPAM 2 MG: 2 INJECTION, SOLUTION INTRAMUSCULAR; INTRAVENOUS at 19:16

## 2020-03-24 RX ADMIN — LORAZEPAM 2 MG: 2 INJECTION INTRAMUSCULAR at 19:16

## 2020-03-24 RX ADMIN — SODIUM CHLORIDE 1000 ML: 900 INJECTION, SOLUTION INTRAVENOUS at 18:52

## 2020-03-24 RX ADMIN — Medication 10 ML: at 18:33

## 2020-03-25 ENCOUNTER — TELEPHONE (OUTPATIENT)
Dept: FAMILY MEDICINE CLINIC | Facility: CLINIC | Age: 70
End: 2020-03-25

## 2020-03-25 ENCOUNTER — HOSPITAL ENCOUNTER (EMERGENCY)
Facility: HOSPITAL | Age: 70
Discharge: HOME | End: 2020-03-26
Attending: EMERGENCY MEDICINE | Admitting: EMERGENCY MEDICINE

## 2020-03-25 ENCOUNTER — APPOINTMENT (OUTPATIENT)
Dept: GENERAL RADIOLOGY | Facility: HOSPITAL | Age: 70
End: 2020-03-25

## 2020-03-25 ENCOUNTER — DOCUMENTATION (OUTPATIENT)
Dept: FAMILY MEDICINE CLINIC | Facility: CLINIC | Age: 70
End: 2020-03-25

## 2020-03-25 DIAGNOSIS — R07.89 ATYPICAL CHEST PAIN: ICD-10-CM

## 2020-03-25 DIAGNOSIS — K57.90 DIVERTICULOSIS: Primary | ICD-10-CM

## 2020-03-25 DIAGNOSIS — R10.84 GENERALIZED ABDOMINAL PAIN: ICD-10-CM

## 2020-03-25 DIAGNOSIS — R10.9 CHRONIC ABDOMINAL PAIN: Primary | ICD-10-CM

## 2020-03-25 DIAGNOSIS — G89.29 CHRONIC ABDOMINAL PAIN: Primary | ICD-10-CM

## 2020-03-25 LAB
ALBUMIN SERPL-MCNC: 4.6 G/DL (ref 3.5–5.2)
ALBUMIN/GLOB SERPL: 1.6 G/DL
ALP SERPL-CCNC: 61 U/L (ref 39–117)
ALT SERPL W P-5'-P-CCNC: 13 U/L (ref 1–41)
ANION GAP SERPL CALCULATED.3IONS-SCNC: 15 MMOL/L (ref 5–15)
AST SERPL-CCNC: 17 U/L (ref 1–40)
BASOPHILS # BLD AUTO: 0.03 10*3/MM3 (ref 0–0.2)
BASOPHILS NFR BLD AUTO: 0.2 % (ref 0–1.5)
BILIRUB SERPL-MCNC: 0.5 MG/DL (ref 0.2–1.2)
BUN BLD-MCNC: 14 MG/DL (ref 8–23)
BUN/CREAT SERPL: 12.2 (ref 7–25)
CALCIUM SPEC-SCNC: 9.3 MG/DL (ref 8.6–10.5)
CHLORIDE SERPL-SCNC: 99 MMOL/L (ref 98–107)
CO2 SERPL-SCNC: 24 MMOL/L (ref 22–29)
CREAT BLD-MCNC: 1.15 MG/DL (ref 0.76–1.27)
DEPRECATED RDW RBC AUTO: 47.5 FL (ref 37–54)
EOSINOPHIL # BLD AUTO: 0.04 10*3/MM3 (ref 0–0.4)
EOSINOPHIL NFR BLD AUTO: 0.3 % (ref 0.3–6.2)
ERYTHROCYTE [DISTWIDTH] IN BLOOD BY AUTOMATED COUNT: 14.4 % (ref 12.3–15.4)
ETHANOL BLD-MCNC: <10 MG/DL (ref 0–10)
ETHANOL UR QL: <0.01 %
GFR SERPL CREATININE-BSD FRML MDRD: 63 ML/MIN/1.73
GLOBULIN UR ELPH-MCNC: 2.9 GM/DL
GLUCOSE BLD-MCNC: 159 MG/DL (ref 65–99)
HCT VFR BLD AUTO: 43.2 % (ref 37.5–51)
HGB BLD-MCNC: 14.9 G/DL (ref 13–17.7)
IMM GRANULOCYTES # BLD AUTO: 0.04 10*3/MM3 (ref 0–0.05)
IMM GRANULOCYTES NFR BLD AUTO: 0.3 % (ref 0–0.5)
LIPASE SERPL-CCNC: 23 U/L (ref 13–60)
LYMPHOCYTES # BLD AUTO: 2.68 10*3/MM3 (ref 0.7–3.1)
LYMPHOCYTES NFR BLD AUTO: 20.8 % (ref 19.6–45.3)
MCH RBC QN AUTO: 31.2 PG (ref 26.6–33)
MCHC RBC AUTO-ENTMCNC: 34.5 G/DL (ref 31.5–35.7)
MCV RBC AUTO: 90.6 FL (ref 79–97)
MONOCYTES # BLD AUTO: 1.29 10*3/MM3 (ref 0.1–0.9)
MONOCYTES NFR BLD AUTO: 10 % (ref 5–12)
NEUTROPHILS # BLD AUTO: 8.82 10*3/MM3 (ref 1.7–7)
NEUTROPHILS NFR BLD AUTO: 68.4 % (ref 42.7–76)
NRBC BLD AUTO-RTO: 0 /100 WBC (ref 0–0.2)
NT-PROBNP SERPL-MCNC: 133.3 PG/ML (ref 5–900)
PLATELET # BLD AUTO: 220 10*3/MM3 (ref 140–450)
PMV BLD AUTO: 10.9 FL (ref 6–12)
POTASSIUM BLD-SCNC: 4.1 MMOL/L (ref 3.5–5.2)
PROT SERPL-MCNC: 7.5 G/DL (ref 6–8.5)
RBC # BLD AUTO: 4.77 10*6/MM3 (ref 4.14–5.8)
SODIUM BLD-SCNC: 138 MMOL/L (ref 136–145)
TROPONIN T SERPL-MCNC: <0.01 NG/ML (ref 0–0.03)
WBC NRBC COR # BLD: 12.9 10*3/MM3 (ref 3.4–10.8)

## 2020-03-25 PROCEDURE — 71045 X-RAY EXAM CHEST 1 VIEW: CPT

## 2020-03-25 PROCEDURE — 99284 EMERGENCY DEPT VISIT MOD MDM: CPT

## 2020-03-25 PROCEDURE — 83880 ASSAY OF NATRIURETIC PEPTIDE: CPT | Performed by: EMERGENCY MEDICINE

## 2020-03-25 PROCEDURE — 84484 ASSAY OF TROPONIN QUANT: CPT | Performed by: EMERGENCY MEDICINE

## 2020-03-25 PROCEDURE — 80307 DRUG TEST PRSMV CHEM ANLYZR: CPT | Performed by: EMERGENCY MEDICINE

## 2020-03-25 PROCEDURE — 85025 COMPLETE CBC W/AUTO DIFF WBC: CPT | Performed by: EMERGENCY MEDICINE

## 2020-03-25 PROCEDURE — 93010 ELECTROCARDIOGRAM REPORT: CPT | Performed by: INTERNAL MEDICINE

## 2020-03-25 PROCEDURE — 93005 ELECTROCARDIOGRAM TRACING: CPT | Performed by: EMERGENCY MEDICINE

## 2020-03-25 PROCEDURE — 83690 ASSAY OF LIPASE: CPT | Performed by: EMERGENCY MEDICINE

## 2020-03-25 PROCEDURE — 80053 COMPREHEN METABOLIC PANEL: CPT | Performed by: EMERGENCY MEDICINE

## 2020-03-25 RX ORDER — METRONIDAZOLE 500 MG/1
500 TABLET ORAL 2 TIMES DAILY
Qty: 14 TABLET | Refills: 0 | Status: SHIPPED | OUTPATIENT
Start: 2020-03-25 | End: 2020-04-01

## 2020-03-25 RX ORDER — SODIUM CHLORIDE 0.9 % (FLUSH) 0.9 %
10 SYRINGE (ML) INJECTION AS NEEDED
Status: DISCONTINUED | OUTPATIENT
Start: 2020-03-25 | End: 2020-03-26 | Stop reason: HOSPADM

## 2020-03-25 NOTE — TELEPHONE ENCOUNTER
Called and spoke with Mr. Ramsay this morning concerning the results of his recent CAT scan that have been performed in the emergency department on March 24, 2020.  Informed that CT did show diverticulosis with bilateral inguinal hernias and bilateral hydroceles.  Prescription for Flagyl sent to pharmacy to try to ease abdominal discomfort and discussed referral to gastroenterology if no improvement or worsening of symptoms.  Informed that hydroceles generally resolve themselves and he denies any testicular pain at this time.

## 2020-03-26 VITALS
WEIGHT: 222.2 LBS | DIASTOLIC BLOOD PRESSURE: 58 MMHG | HEART RATE: 78 BPM | TEMPERATURE: 97.9 F | SYSTOLIC BLOOD PRESSURE: 124 MMHG | RESPIRATION RATE: 18 BRPM | HEIGHT: 68 IN | BODY MASS INDEX: 33.68 KG/M2 | OXYGEN SATURATION: 96 %

## 2020-03-26 LAB
AMPHET+METHAMPHET UR QL: NEGATIVE
AMPHETAMINES UR QL: NEGATIVE
BARBITURATES UR QL SCN: NEGATIVE
BENZODIAZ UR QL SCN: POSITIVE
BUPRENORPHINE SERPL-MCNC: NEGATIVE NG/ML
CANNABINOIDS SERPL QL: NEGATIVE
COCAINE UR QL: NEGATIVE
METHADONE UR QL SCN: NEGATIVE
OPIATES UR QL: NEGATIVE
OXYCODONE UR QL SCN: NEGATIVE
PCP UR QL SCN: NEGATIVE
PROPOXYPH UR QL: NEGATIVE
TRICYCLICS UR QL SCN: NEGATIVE

## 2020-03-26 PROCEDURE — 80306 DRUG TEST PRSMV INSTRMNT: CPT | Performed by: EMERGENCY MEDICINE

## 2020-03-26 RX ORDER — ALUMINA, MAGNESIA, AND SIMETHICONE 2400; 2400; 240 MG/30ML; MG/30ML; MG/30ML
15 SUSPENSION ORAL ONCE
Status: COMPLETED | OUTPATIENT
Start: 2020-03-26 | End: 2020-03-26

## 2020-03-26 RX ORDER — LIDOCAINE HYDROCHLORIDE 20 MG/ML
15 SOLUTION OROPHARYNGEAL ONCE
Status: COMPLETED | OUTPATIENT
Start: 2020-03-26 | End: 2020-03-26

## 2020-03-26 RX ADMIN — ALUMINUM HYDROXIDE, MAGNESIUM HYDROXIDE, AND DIMETHICONE 15 ML: 400; 400; 40 SUSPENSION ORAL at 00:53

## 2020-03-26 RX ADMIN — LIDOCAINE HYDROCHLORIDE 15 ML: 20 SOLUTION ORAL; TOPICAL at 00:53

## 2020-03-28 ENCOUNTER — HOSPITAL ENCOUNTER (EMERGENCY)
Facility: HOSPITAL | Age: 70
Discharge: HOME | End: 2020-03-28
Attending: EMERGENCY MEDICINE | Admitting: EMERGENCY MEDICINE

## 2020-03-28 ENCOUNTER — APPOINTMENT (OUTPATIENT)
Dept: GENERAL RADIOLOGY | Facility: HOSPITAL | Age: 70
End: 2020-03-28

## 2020-03-28 VITALS
WEIGHT: 209 LBS | OXYGEN SATURATION: 97 % | DIASTOLIC BLOOD PRESSURE: 67 MMHG | HEIGHT: 68 IN | SYSTOLIC BLOOD PRESSURE: 145 MMHG | RESPIRATION RATE: 18 BRPM | HEART RATE: 68 BPM | TEMPERATURE: 97.9 F | BODY MASS INDEX: 31.67 KG/M2

## 2020-03-28 DIAGNOSIS — R10.30 LOWER ABDOMINAL PAIN: Primary | ICD-10-CM

## 2020-03-28 LAB
ALBUMIN SERPL-MCNC: 4.8 G/DL (ref 3.5–5.2)
ALBUMIN/GLOB SERPL: 2 G/DL
ALP SERPL-CCNC: 61 U/L (ref 39–117)
ALT SERPL W P-5'-P-CCNC: 20 U/L (ref 1–41)
AMYLASE SERPL-CCNC: 61 U/L (ref 28–100)
ANION GAP SERPL CALCULATED.3IONS-SCNC: 16 MMOL/L (ref 5–15)
AST SERPL-CCNC: 28 U/L (ref 1–40)
BASOPHILS # BLD AUTO: 0.03 10*3/MM3 (ref 0–0.2)
BASOPHILS NFR BLD AUTO: 0.2 % (ref 0–1.5)
BILIRUB SERPL-MCNC: 0.6 MG/DL (ref 0.2–1.2)
BILIRUB UR QL STRIP: ABNORMAL
BUN BLD-MCNC: 18 MG/DL (ref 8–23)
BUN/CREAT SERPL: 11.5 (ref 7–25)
CALCIUM SPEC-SCNC: 9 MG/DL (ref 8.6–10.5)
CHLORIDE SERPL-SCNC: 98 MMOL/L (ref 98–107)
CLARITY UR: CLEAR
CO2 SERPL-SCNC: 21 MMOL/L (ref 22–29)
COLOR UR: ABNORMAL
CREAT BLD-MCNC: 1.57 MG/DL (ref 0.76–1.27)
DEPRECATED RDW RBC AUTO: 45.9 FL (ref 37–54)
EOSINOPHIL # BLD AUTO: 0.07 10*3/MM3 (ref 0–0.4)
EOSINOPHIL NFR BLD AUTO: 0.5 % (ref 0.3–6.2)
ERYTHROCYTE [DISTWIDTH] IN BLOOD BY AUTOMATED COUNT: 14.1 % (ref 12.3–15.4)
GFR SERPL CREATININE-BSD FRML MDRD: 44 ML/MIN/1.73
GLOBULIN UR ELPH-MCNC: 2.4 GM/DL
GLUCOSE BLD-MCNC: 201 MG/DL (ref 65–99)
GLUCOSE UR STRIP-MCNC: NEGATIVE MG/DL
HCT VFR BLD AUTO: 43.5 % (ref 37.5–51)
HGB BLD-MCNC: 15.3 G/DL (ref 13–17.7)
HGB UR QL STRIP.AUTO: NEGATIVE
HOLD SPECIMEN: NORMAL
IMM GRANULOCYTES # BLD AUTO: 0.08 10*3/MM3 (ref 0–0.05)
IMM GRANULOCYTES NFR BLD AUTO: 0.6 % (ref 0–0.5)
KETONES UR QL STRIP: NEGATIVE
LEUKOCYTE ESTERASE UR QL STRIP.AUTO: NEGATIVE
LIPASE SERPL-CCNC: 24 U/L (ref 13–60)
LYMPHOCYTES # BLD AUTO: 2.04 10*3/MM3 (ref 0.7–3.1)
LYMPHOCYTES NFR BLD AUTO: 14.6 % (ref 19.6–45.3)
MCH RBC QN AUTO: 31.6 PG (ref 26.6–33)
MCHC RBC AUTO-ENTMCNC: 35.2 G/DL (ref 31.5–35.7)
MCV RBC AUTO: 89.9 FL (ref 79–97)
MONOCYTES # BLD AUTO: 1.08 10*3/MM3 (ref 0.1–0.9)
MONOCYTES NFR BLD AUTO: 7.7 % (ref 5–12)
NEUTROPHILS # BLD AUTO: 10.68 10*3/MM3 (ref 1.7–7)
NEUTROPHILS NFR BLD AUTO: 76.4 % (ref 42.7–76)
NITRITE UR QL STRIP: NEGATIVE
NRBC BLD AUTO-RTO: 0 /100 WBC (ref 0–0.2)
PH UR STRIP.AUTO: <=5 [PH] (ref 5–9)
PLATELET # BLD AUTO: 253 10*3/MM3 (ref 140–450)
PMV BLD AUTO: 10.7 FL (ref 6–12)
POTASSIUM BLD-SCNC: 4.1 MMOL/L (ref 3.5–5.2)
PROT SERPL-MCNC: 7.2 G/DL (ref 6–8.5)
PROT UR QL STRIP: NEGATIVE
RBC # BLD AUTO: 4.84 10*6/MM3 (ref 4.14–5.8)
SODIUM BLD-SCNC: 135 MMOL/L (ref 136–145)
SP GR UR STRIP: 1.02 (ref 1–1.03)
UROBILINOGEN UR QL STRIP: ABNORMAL
WBC NRBC COR # BLD: 13.98 10*3/MM3 (ref 3.4–10.8)
WHOLE BLOOD HOLD SPECIMEN: NORMAL

## 2020-03-28 PROCEDURE — 85025 COMPLETE CBC W/AUTO DIFF WBC: CPT | Performed by: NURSE PRACTITIONER

## 2020-03-28 PROCEDURE — 74018 RADEX ABDOMEN 1 VIEW: CPT

## 2020-03-28 PROCEDURE — 99284 EMERGENCY DEPT VISIT MOD MDM: CPT

## 2020-03-28 PROCEDURE — 82150 ASSAY OF AMYLASE: CPT | Performed by: NURSE PRACTITIONER

## 2020-03-28 PROCEDURE — 83690 ASSAY OF LIPASE: CPT | Performed by: NURSE PRACTITIONER

## 2020-03-28 PROCEDURE — 80053 COMPREHEN METABOLIC PANEL: CPT | Performed by: NURSE PRACTITIONER

## 2020-03-28 PROCEDURE — 81003 URINALYSIS AUTO W/O SCOPE: CPT | Performed by: NURSE PRACTITIONER

## 2020-03-28 RX ORDER — HYDROCODONE BITARTRATE AND ACETAMINOPHEN 5; 325 MG/1; MG/1
1 TABLET ORAL EVERY 4 HOURS PRN
COMMUNITY
Start: 2020-03-26 | End: 2020-04-03

## 2020-03-28 RX ORDER — LEVOFLOXACIN 500 MG/1
500 TABLET, FILM COATED ORAL ONCE
Status: COMPLETED | OUTPATIENT
Start: 2020-03-28 | End: 2020-03-28

## 2020-03-28 RX ORDER — ONDANSETRON 4 MG/1
4 TABLET, ORALLY DISINTEGRATING ORAL EVERY 8 HOURS PRN
COMMUNITY
Start: 2020-03-26 | End: 2020-04-03

## 2020-03-28 RX ORDER — DICYCLOMINE HCL 20 MG
20 TABLET ORAL EVERY 6 HOURS PRN
Qty: 20 TABLET | Refills: 0 | Status: SHIPPED | OUTPATIENT
Start: 2020-03-28 | End: 2021-09-22

## 2020-03-28 RX ORDER — OXYCODONE AND ACETAMINOPHEN 7.5; 325 MG/1; MG/1
1 TABLET ORAL ONCE
Status: COMPLETED | OUTPATIENT
Start: 2020-03-28 | End: 2020-03-28

## 2020-03-28 RX ORDER — PROMETHAZINE HYDROCHLORIDE 25 MG/1
25 TABLET ORAL EVERY 6 HOURS PRN
Qty: 10 TABLET | Refills: 0 | Status: SHIPPED | OUTPATIENT
Start: 2020-03-28 | End: 2022-08-31

## 2020-03-28 RX ORDER — SODIUM CHLORIDE 0.9 % (FLUSH) 0.9 %
10 SYRINGE (ML) INJECTION AS NEEDED
Status: DISCONTINUED | OUTPATIENT
Start: 2020-03-28 | End: 2020-03-28 | Stop reason: HOSPADM

## 2020-03-28 RX ADMIN — OXYCODONE HYDROCHLORIDE AND ACETAMINOPHEN 1 TABLET: 7.5; 325 TABLET ORAL at 18:23

## 2020-03-28 RX ADMIN — LEVOFLOXACIN 500 MG: 500 TABLET, FILM COATED ORAL at 20:15

## 2020-03-31 ENCOUNTER — OFFICE VISIT (OUTPATIENT)
Dept: GASTROENTEROLOGY | Facility: CLINIC | Age: 70
End: 2020-03-31

## 2020-03-31 VITALS
HEART RATE: 77 BPM | SYSTOLIC BLOOD PRESSURE: 140 MMHG | WEIGHT: 216.6 LBS | OXYGEN SATURATION: 99 % | BODY MASS INDEX: 32.83 KG/M2 | DIASTOLIC BLOOD PRESSURE: 70 MMHG | HEIGHT: 68 IN

## 2020-03-31 DIAGNOSIS — R10.12 LEFT UPPER QUADRANT PAIN: Primary | ICD-10-CM

## 2020-03-31 PROCEDURE — 99214 OFFICE O/P EST MOD 30 MIN: CPT | Performed by: INTERNAL MEDICINE

## 2020-03-31 RX ORDER — METRONIDAZOLE 500 MG/1
500 TABLET ORAL 2 TIMES DAILY
Qty: 8 TABLET | Refills: 0 | Status: SHIPPED | OUTPATIENT
Start: 2020-03-31 | End: 2020-09-14

## 2020-03-31 NOTE — PATIENT INSTRUCTIONS

## 2020-03-31 NOTE — PROGRESS NOTES
[facility]      Chief Complaint:   Chief Complaint   Patient presents with   • ER Visit F/U       Subjective     HPI:   Patient for follow-up from ER visit.  Patient has had some left upper quadrant abdominal pain with some change in bowel habits.  Patient states that at times he is getting constipated patient is been to the emergency room twice for this was given Flagyl for possible diverticulitis and Norco for pain.  Discussed with patient that the Norco was probably causing the constipation and change in bowel habits patient's had some improvement in abdominal pain since taking the Flagyl currently on day 7.  Will have patient extend to day 10 on the Flagyl.  Patient has had a colonoscopy recently which showed diverticular disease no polyps or tumors seen    Plan; we will have patient follow-up in 4 weeks to see if any improvement in symptoms.  If no improvement in symptoms patient will need endoscopy to evaluate.  If patient's symptoms are relieved this most likely diverticulitis and no further treatment will be needed.    Past Medical History:   Past Medical History:   Diagnosis Date   • Anxiety    • Diabetes (CMS/HCC)    • Essential hypertension    • GERD (gastroesophageal reflux disease)    • Hyperlipidemia    • Insomnia    • Type 2 diabetes mellitus (CMS/HCC)        Past Surgical History:  Past Surgical History:   Procedure Laterality Date   • COLECTOMY PARTIAL / TOTAL     • COLONOSCOPY N/A 5/8/2019    Procedure: COLONOSCOPY;  Surgeon: Cesar Santos MD;  Location: [facility] ENDOSCOPY;  Service: Gastroenterology   • ENDOSCOPY AND COLONOSCOPY  04/19/2012       Family History:  Family History   Problem Relation Age of Onset   • Diabetes Mother    • Hypertension Mother    • Heart disease Mother    • Cancer Father        Social History:   reports that he has quit smoking. He has quit using smokeless tobacco. He reports that he does not drink alcohol or use drugs.    Medications:   Prior  to Admission medications    Medication Sig Start Date End Date Taking? Authorizing Provider   aspirin 81 MG EC tablet Take 81 mg by mouth Daily.   Yes Dae Dee MD   diclofenac (VOLTAREN) 75 MG EC tablet Take 1 tablet by mouth 2 (Two) Times a Day As Needed (pain). 2/11/19  Yes Elvira Ramsay APRN   dicyclomine (Bentyl) 20 MG tablet Take 1 tablet by mouth Every 6 (Six) Hours As Needed (ABD CRAMPING). 3/28/20  Yes Jose Fajardo APRN   Dulaglutide 0.75 MG/0.5ML solution pen-injector Inject 0.75 mg under the skin into the appropriate area as directed 1 (One) Time Per Week. 3/25/19  Yes Elvira Ramsay APRN   esomeprazole (nexIUM) 40 MG capsule TAKE 1 CAPSULE BY MOUTH EVERY DAY IN THE MORNING BEFORE BREAKFAST 3/3/20  Yes Elvira Ramsay APRN   glipiZIDE (GLUCOTROL) 5 MG tablet TAKE 1 TABLET BY MOUTH 2 (TWO) TIMES A DAY BEFORE MEALS. 7/23/19  Yes Elvira Ramsay APRN   hydrALAZINE (APRESOLINE) 25 MG tablet TAKE 1 TABLET BY MOUTH THREE TIMES A DAY 12/18/19  Yes Elvira Ramsay APRN   HYDROcodone-acetaminophen (NORCO) 5-325 MG per tablet Take 1 tablet by mouth Every 4 (Four) Hours As Needed. 3/26/20 4/3/20 Yes Dae Dee MD   losartan-hydrochlorothiazide (HYZAAR) 100-25 MG per tablet Take 1 tablet by mouth Daily. 6/20/19  Yes Elvira Ramsay APRN   metFORMIN (GLUCOPHAGE) 1000 MG tablet Take 1 tablet by mouth 2 (Two) Times a Day With Meals. 6/20/19  Yes Elvira Ramsay APRN   metroNIDAZOLE (Flagyl) 500 MG tablet Take 1 tablet by mouth 2 (Two) Times a Day for 7 days. 3/25/20 4/1/20 Yes Elvira Ramsay APRN   ondansetron ODT (ZOFRAN-ODT) 4 MG disintegrating tablet Take 4 mg by mouth Every 8 (Eight) Hours As Needed. 3/26/20 4/3/20 Yes Dae Dee MD   PARoxetine CR (PAXIL CR) 37.5 MG 24 hr tablet Take 1 tablet by mouth Every Morning. 1/29/20  Yes Elvira Ramsay APRN   pravastatin (PRAVACHOL) 40 MG tablet Take 1 tablet by mouth every night at bedtime. 6/20/19  Yes Elvira Ramsay APRN   promethazine  "(PHENERGAN) 25 MG tablet Take 1 tablet by mouth Every 6 (Six) Hours As Needed for Nausea or Vomiting. 3/28/20  Yes Jose Fajardo APRN   sildenafil (REVATIO) 20 MG tablet Use 1-5 tablets as needed for erectile dysfunction. Do not exceed 100mg in 24 hours. 12/5/19  Yes Elvira Ramsay APRN   Testosterone Cypionate (DEPOTESTOTERONE CYPIONATE) 200 MG/ML injection Inject 2 mL into the appropriate muscle as directed by prescriber Every 28 (Twenty-Eight) Days. 3/5/20  Yes Elvira Ramsay APRN   traZODone (DESYREL) 50 MG tablet TAKE 1 TABLET BY MOUTH EVERY DAY AT NIGHT 10/25/19  Yes Elvira Ramsay APRN   VASCEPA 1 g capsule capsule TAKE 2 CAPSULES BY MOUTH 2 TIMES A DAY WITH MEALS. 12/12/19  Yes Elvira Ramsay APRN   metroNIDAZOLE (FLAGYL) 500 MG tablet Take 1 tablet by mouth 2 (Two) Times a Day. 3/31/20   Cesar Santos MD       Allergies:  Patient has no known allergies.    ROS:    Review of Systems   Constitutional: Negative for activity change, appetite change and unexpected weight change.   HENT: Negative for congestion, sore throat and trouble swallowing.    Respiratory: Negative for cough, choking and shortness of breath.    Cardiovascular: Negative for chest pain.   Gastrointestinal: Positive for abdominal pain. Negative for abdominal distention, anal bleeding, blood in stool, constipation, diarrhea, nausea, rectal pain and vomiting.   Endocrine: Negative for heat intolerance, polydipsia and polyphagia.   Genitourinary: Negative for difficulty urinating.   Musculoskeletal: Negative for arthralgias.   Skin: Negative for color change, pallor, rash and wound.   Allergic/Immunologic: Negative for food allergies.   Neurological: Negative for dizziness, syncope, weakness and headaches.   Psychiatric/Behavioral: Negative for agitation, behavioral problems, confusion and decreased concentration.     Objective     Blood pressure 140/70, pulse 77, height 172.7 cm (68\"), weight 98.2 kg (216 lb 9.6 oz), SpO2 99 " %.    Physical Exam   Constitutional: He is oriented to person, place, and time. He appears well-developed and well-nourished. No distress.   HENT:   Head: Normocephalic and atraumatic.   Cardiovascular: Normal rate, regular rhythm, normal heart sounds and intact distal pulses. Exam reveals no gallop and no friction rub.   No murmur heard.  Pulmonary/Chest: Breath sounds normal. No respiratory distress. He has no wheezes. He has no rales. He exhibits no tenderness.   Abdominal: Soft. Bowel sounds are normal. He exhibits no distension and no mass. There is no tenderness. There is no rebound and no guarding. No hernia.   Musculoskeletal: Normal range of motion. He exhibits no edema.   Neurological: He is alert and oriented to person, place, and time.   Skin: Skin is warm and dry. No rash noted. He is not diaphoretic. No erythema. No pallor.   Psychiatric: He has a normal mood and affect. His behavior is normal. Judgment and thought content normal.        Assessment/Plan   Hung was seen today for er visit f/u.    Diagnoses and all orders for this visit:    Left upper quadrant pain    Other orders  -     metroNIDAZOLE (FLAGYL) 500 MG tablet; Take 1 tablet by mouth 2 (Two) Times a Day.        * Surgery not found *     Diagnosis Plan   1. Left upper quadrant pain         Anticipated Surgical Procedure:  No orders of the defined types were placed in this encounter.      The risks, benefits, and alternatives of this procedure have been discussed with the patient or the responsible party- the patient understands and agrees to proceed.

## 2020-04-03 DIAGNOSIS — N52.9 ERECTILE DYSFUNCTION, UNSPECIFIED ERECTILE DYSFUNCTION TYPE: ICD-10-CM

## 2020-04-03 RX ORDER — TESTOSTERONE CYPIONATE 200 MG/ML
400 INJECTION, SOLUTION INTRAMUSCULAR
Qty: 2 ML | Refills: 0 | Status: SHIPPED | OUTPATIENT
Start: 2020-04-03 | End: 2020-10-07 | Stop reason: SDUPTHER

## 2020-04-05 DIAGNOSIS — E11.9 TYPE 2 DIABETES MELLITUS WITHOUT COMPLICATION, WITHOUT LONG-TERM CURRENT USE OF INSULIN (HCC): ICD-10-CM

## 2020-04-06 DIAGNOSIS — E78.1 HYPERTRIGLYCERIDEMIA: ICD-10-CM

## 2020-04-06 RX ORDER — GLIPIZIDE 5 MG/1
5 TABLET ORAL
Qty: 60 TABLET | Refills: 0 | Status: SHIPPED | OUTPATIENT
Start: 2020-04-06 | End: 2020-05-04

## 2020-04-06 RX ORDER — ICOSAPENT ETHYL 1000 MG/1
CAPSULE ORAL
Qty: 120 CAPSULE | Refills: 2 | Status: SHIPPED | OUTPATIENT
Start: 2020-04-06 | End: 2020-07-02

## 2020-05-02 DIAGNOSIS — G47.00 INSOMNIA, UNSPECIFIED TYPE: ICD-10-CM

## 2020-05-02 DIAGNOSIS — E11.9 TYPE 2 DIABETES MELLITUS WITHOUT COMPLICATION, WITHOUT LONG-TERM CURRENT USE OF INSULIN (HCC): ICD-10-CM

## 2020-05-02 DIAGNOSIS — F41.9 ANXIETY: ICD-10-CM

## 2020-05-04 RX ORDER — TRAZODONE HYDROCHLORIDE 50 MG/1
TABLET ORAL
Qty: 90 TABLET | Refills: 1 | Status: SHIPPED | OUTPATIENT
Start: 2020-05-04 | End: 2020-08-11

## 2020-05-04 RX ORDER — GLIPIZIDE 5 MG/1
5 TABLET ORAL
Qty: 60 TABLET | Refills: 0 | Status: SHIPPED | OUTPATIENT
Start: 2020-05-04 | End: 2020-06-01

## 2020-05-28 ENCOUNTER — OFFICE VISIT (OUTPATIENT)
Dept: FAMILY MEDICINE CLINIC | Facility: CLINIC | Age: 70
End: 2020-05-28

## 2020-05-28 DIAGNOSIS — E29.1 TESTICULAR HYPOFUNCTION: ICD-10-CM

## 2020-05-28 PROCEDURE — 96372 THER/PROPH/DIAG INJ SC/IM: CPT | Performed by: NURSE PRACTITIONER

## 2020-05-28 RX ADMIN — TESTOSTERONE CYPIONATE 400 MG: 200 INJECTION, SOLUTION INTRAMUSCULAR at 08:46

## 2020-05-31 DIAGNOSIS — E11.9 TYPE 2 DIABETES MELLITUS WITHOUT COMPLICATION, WITHOUT LONG-TERM CURRENT USE OF INSULIN (HCC): ICD-10-CM

## 2020-06-01 RX ORDER — GLIPIZIDE 5 MG/1
5 TABLET ORAL
Qty: 60 TABLET | Refills: 0 | Status: SHIPPED | OUTPATIENT
Start: 2020-06-01 | End: 2020-06-30

## 2020-06-30 DIAGNOSIS — E11.9 TYPE 2 DIABETES MELLITUS WITHOUT COMPLICATION, WITHOUT LONG-TERM CURRENT USE OF INSULIN (HCC): ICD-10-CM

## 2020-06-30 RX ORDER — GLIPIZIDE 5 MG/1
5 TABLET ORAL
Qty: 60 TABLET | Refills: 0 | Status: SHIPPED | OUTPATIENT
Start: 2020-06-30 | End: 2020-07-28

## 2020-07-02 DIAGNOSIS — E78.1 HYPERTRIGLYCERIDEMIA: ICD-10-CM

## 2020-07-02 RX ORDER — ICOSAPENT ETHYL 1000 MG/1
CAPSULE ORAL
Qty: 120 CAPSULE | Refills: 2 | Status: SHIPPED | OUTPATIENT
Start: 2020-07-02 | End: 2020-09-29

## 2020-07-07 ENCOUNTER — OFFICE VISIT (OUTPATIENT)
Dept: FAMILY MEDICINE CLINIC | Facility: CLINIC | Age: 70
End: 2020-07-07

## 2020-07-07 DIAGNOSIS — E29.1 TESTICULAR HYPOFUNCTION: ICD-10-CM

## 2020-07-07 PROCEDURE — 96372 THER/PROPH/DIAG INJ SC/IM: CPT | Performed by: NURSE PRACTITIONER

## 2020-07-07 RX ADMIN — TESTOSTERONE CYPIONATE 400 MG: 200 INJECTION, SOLUTION INTRAMUSCULAR at 13:26

## 2020-07-22 DIAGNOSIS — I10 ESSENTIAL HYPERTENSION: ICD-10-CM

## 2020-07-22 RX ORDER — HYDRALAZINE HYDROCHLORIDE 25 MG/1
TABLET, FILM COATED ORAL
Qty: 270 TABLET | Refills: 1 | Status: SHIPPED | OUTPATIENT
Start: 2020-07-22 | End: 2020-10-22

## 2020-07-28 DIAGNOSIS — E11.9 TYPE 2 DIABETES MELLITUS WITHOUT COMPLICATION, WITHOUT LONG-TERM CURRENT USE OF INSULIN (HCC): ICD-10-CM

## 2020-07-28 RX ORDER — GLIPIZIDE 5 MG/1
5 TABLET ORAL
Qty: 60 TABLET | Refills: 0 | Status: SHIPPED | OUTPATIENT
Start: 2020-07-28 | End: 2020-08-11

## 2020-08-03 DIAGNOSIS — E78.5 HYPERLIPIDEMIA, UNSPECIFIED HYPERLIPIDEMIA TYPE: ICD-10-CM

## 2020-08-03 RX ORDER — PRAVASTATIN SODIUM 40 MG
TABLET ORAL
Qty: 90 TABLET | Refills: 2 | Status: SHIPPED | OUTPATIENT
Start: 2020-08-03 | End: 2021-06-01

## 2020-08-04 ENCOUNTER — OFFICE VISIT (OUTPATIENT)
Dept: FAMILY MEDICINE CLINIC | Facility: CLINIC | Age: 70
End: 2020-08-04

## 2020-08-04 DIAGNOSIS — E29.1 TESTICULAR HYPOFUNCTION: ICD-10-CM

## 2020-08-04 PROCEDURE — 96372 THER/PROPH/DIAG INJ SC/IM: CPT | Performed by: NURSE PRACTITIONER

## 2020-08-04 RX ADMIN — TESTOSTERONE CYPIONATE 400 MG: 200 INJECTION, SOLUTION INTRAMUSCULAR at 13:11

## 2020-08-11 DIAGNOSIS — E11.9 TYPE 2 DIABETES MELLITUS WITHOUT COMPLICATION, WITHOUT LONG-TERM CURRENT USE OF INSULIN (HCC): ICD-10-CM

## 2020-08-11 DIAGNOSIS — G47.00 INSOMNIA, UNSPECIFIED TYPE: ICD-10-CM

## 2020-08-11 DIAGNOSIS — F41.9 ANXIETY: ICD-10-CM

## 2020-08-11 RX ORDER — ESOMEPRAZOLE MAGNESIUM 40 MG/1
CAPSULE, DELAYED RELEASE ORAL
Qty: 90 CAPSULE | Refills: 1 | Status: SHIPPED | OUTPATIENT
Start: 2020-08-11 | End: 2020-11-12

## 2020-08-11 RX ORDER — TRAZODONE HYDROCHLORIDE 50 MG/1
TABLET ORAL
Qty: 90 TABLET | Refills: 1 | Status: SHIPPED | OUTPATIENT
Start: 2020-08-11 | End: 2021-04-14

## 2020-08-11 RX ORDER — GLIPIZIDE 5 MG/1
5 TABLET ORAL
Qty: 60 TABLET | Refills: 0 | Status: SHIPPED | OUTPATIENT
Start: 2020-08-11 | End: 2020-09-21

## 2020-08-27 ENCOUNTER — OFFICE VISIT (OUTPATIENT)
Dept: FAMILY MEDICINE CLINIC | Facility: CLINIC | Age: 70
End: 2020-08-27

## 2020-08-27 VITALS
DIASTOLIC BLOOD PRESSURE: 66 MMHG | SYSTOLIC BLOOD PRESSURE: 126 MMHG | BODY MASS INDEX: 30.31 KG/M2 | HEIGHT: 68 IN | WEIGHT: 200 LBS

## 2020-08-27 DIAGNOSIS — G89.29 CHRONIC PAIN OF LEFT KNEE: Primary | ICD-10-CM

## 2020-08-27 DIAGNOSIS — E78.5 HYPERLIPIDEMIA, UNSPECIFIED HYPERLIPIDEMIA TYPE: ICD-10-CM

## 2020-08-27 DIAGNOSIS — Z13.29 SCREENING FOR HYPOTHYROIDISM: ICD-10-CM

## 2020-08-27 DIAGNOSIS — E29.1 TESTICULAR HYPOFUNCTION: ICD-10-CM

## 2020-08-27 DIAGNOSIS — R25.1 TREMOR: ICD-10-CM

## 2020-08-27 DIAGNOSIS — E11.9 TYPE 2 DIABETES MELLITUS WITHOUT COMPLICATION, WITHOUT LONG-TERM CURRENT USE OF INSULIN (HCC): ICD-10-CM

## 2020-08-27 DIAGNOSIS — M25.562 CHRONIC PAIN OF LEFT KNEE: Primary | ICD-10-CM

## 2020-08-27 PROCEDURE — 99213 OFFICE O/P EST LOW 20 MIN: CPT | Performed by: NURSE PRACTITIONER

## 2020-08-27 PROCEDURE — 96372 THER/PROPH/DIAG INJ SC/IM: CPT | Performed by: NURSE PRACTITIONER

## 2020-08-27 RX ORDER — CARBIDOPA 25 MG/1
25 TABLET ORAL 3 TIMES DAILY
Qty: 90 TABLET | Refills: 3 | Status: SHIPPED | OUTPATIENT
Start: 2020-08-27 | End: 2020-12-01

## 2020-08-27 RX ORDER — TRIAMCINOLONE ACETONIDE 40 MG/ML
80 INJECTION, SUSPENSION INTRA-ARTICULAR; INTRAMUSCULAR ONCE
Status: COMPLETED | OUTPATIENT
Start: 2020-08-27 | End: 2020-08-27

## 2020-08-27 RX ADMIN — TRIAMCINOLONE ACETONIDE 80 MG: 40 INJECTION, SUSPENSION INTRA-ARTICULAR; INTRAMUSCULAR at 16:11

## 2020-08-28 ENCOUNTER — TELEPHONE (OUTPATIENT)
Dept: FAMILY MEDICINE CLINIC | Facility: CLINIC | Age: 70
End: 2020-08-28

## 2020-08-28 NOTE — TELEPHONE ENCOUNTER
Per GEMINI Fuentes, Mr. Ramsay has been called with recent Left Knee x-ray results & recommendations.  Continue current medications and follow-up as planned or sooner if any problems.      ----- Message from GEMINI Red sent at 8/28/2020  7:21 AM CDT -----  Mild degenerative changes.  This could account for his pain.  If no relief with steroid injections I can refer him to orthopedics for further evaluation.

## 2020-09-03 ENCOUNTER — LAB (OUTPATIENT)
Dept: LAB | Facility: HOSPITAL | Age: 70
End: 2020-09-03

## 2020-09-03 ENCOUNTER — OFFICE VISIT (OUTPATIENT)
Dept: FAMILY MEDICINE CLINIC | Facility: CLINIC | Age: 70
End: 2020-09-03

## 2020-09-03 DIAGNOSIS — R25.1 TREMOR: ICD-10-CM

## 2020-09-03 DIAGNOSIS — E29.1 TESTICULAR HYPOFUNCTION: ICD-10-CM

## 2020-09-03 DIAGNOSIS — E78.5 HYPERLIPIDEMIA, UNSPECIFIED HYPERLIPIDEMIA TYPE: ICD-10-CM

## 2020-09-03 DIAGNOSIS — Z13.29 SCREENING FOR HYPOTHYROIDISM: ICD-10-CM

## 2020-09-03 DIAGNOSIS — E11.9 TYPE 2 DIABETES MELLITUS WITHOUT COMPLICATION, WITHOUT LONG-TERM CURRENT USE OF INSULIN (HCC): ICD-10-CM

## 2020-09-03 LAB
ALBUMIN SERPL-MCNC: 4.3 G/DL (ref 3.5–5.2)
ALBUMIN UR-MCNC: 1.2 MG/DL
ALBUMIN/GLOB SERPL: 1.3 G/DL
ALP SERPL-CCNC: 64 U/L (ref 39–117)
ALT SERPL W P-5'-P-CCNC: 12 U/L (ref 1–41)
ANION GAP SERPL CALCULATED.3IONS-SCNC: 13.3 MMOL/L (ref 5–15)
AST SERPL-CCNC: 11 U/L (ref 1–40)
BASOPHILS # BLD AUTO: 0.04 10*3/MM3 (ref 0–0.2)
BASOPHILS NFR BLD AUTO: 0.3 % (ref 0–1.5)
BILIRUB SERPL-MCNC: 0.5 MG/DL (ref 0–1.2)
BUN SERPL-MCNC: 20 MG/DL (ref 8–23)
BUN/CREAT SERPL: 17.2 (ref 7–25)
CALCIUM SPEC-SCNC: 9.6 MG/DL (ref 8.6–10.5)
CHLORIDE SERPL-SCNC: 102 MMOL/L (ref 98–107)
CHOLEST SERPL-MCNC: 109 MG/DL (ref 0–200)
CO2 SERPL-SCNC: 24.7 MMOL/L (ref 22–29)
CREAT SERPL-MCNC: 1.16 MG/DL (ref 0.76–1.27)
CREAT UR-MCNC: 197.2 MG/DL
DEPRECATED RDW RBC AUTO: 45 FL (ref 37–54)
EOSINOPHIL # BLD AUTO: 0.12 10*3/MM3 (ref 0–0.4)
EOSINOPHIL NFR BLD AUTO: 0.9 % (ref 0.3–6.2)
ERYTHROCYTE [DISTWIDTH] IN BLOOD BY AUTOMATED COUNT: 13.4 % (ref 12.3–15.4)
GFR SERPL CREATININE-BSD FRML MDRD: 62 ML/MIN/1.73
GLOBULIN UR ELPH-MCNC: 3.3 GM/DL
GLUCOSE SERPL-MCNC: 66 MG/DL (ref 65–99)
HBA1C MFR BLD: 5.54 % (ref 4.8–5.6)
HCT VFR BLD AUTO: 47 % (ref 37.5–51)
HDLC SERPL-MCNC: 29 MG/DL (ref 40–60)
HGB BLD-MCNC: 16.9 G/DL (ref 13–17.7)
IMM GRANULOCYTES # BLD AUTO: 0.07 10*3/MM3 (ref 0–0.05)
IMM GRANULOCYTES NFR BLD AUTO: 0.5 % (ref 0–0.5)
LDLC SERPL CALC-MCNC: 38 MG/DL (ref 0–100)
LDLC/HDLC SERPL: 1.3 {RATIO}
LYMPHOCYTES # BLD AUTO: 3.56 10*3/MM3 (ref 0.7–3.1)
LYMPHOCYTES NFR BLD AUTO: 26.1 % (ref 19.6–45.3)
MCH RBC QN AUTO: 32.8 PG (ref 26.6–33)
MCHC RBC AUTO-ENTMCNC: 36 G/DL (ref 31.5–35.7)
MCV RBC AUTO: 91.3 FL (ref 79–97)
MICROALBUMIN/CREAT UR: 6.1 MG/G
MONOCYTES # BLD AUTO: 0.83 10*3/MM3 (ref 0.1–0.9)
MONOCYTES NFR BLD AUTO: 6.1 % (ref 5–12)
NEUTROPHILS NFR BLD AUTO: 66.1 % (ref 42.7–76)
NEUTROPHILS NFR BLD AUTO: 9 10*3/MM3 (ref 1.7–7)
NRBC BLD AUTO-RTO: 0 /100 WBC (ref 0–0.2)
PLATELET # BLD AUTO: 221 10*3/MM3 (ref 140–450)
PMV BLD AUTO: 12.3 FL (ref 6–12)
POTASSIUM SERPL-SCNC: 3.9 MMOL/L (ref 3.5–5.2)
PROT SERPL-MCNC: 7.6 G/DL (ref 6–8.5)
RBC # BLD AUTO: 5.15 10*6/MM3 (ref 4.14–5.8)
SODIUM SERPL-SCNC: 140 MMOL/L (ref 136–145)
TRIGL SERPL-MCNC: 211 MG/DL (ref 0–150)
TSH SERPL DL<=0.05 MIU/L-ACNC: 2.33 UIU/ML (ref 0.27–4.2)
VLDLC SERPL-MCNC: 42.2 MG/DL (ref 5–40)
WBC # BLD AUTO: 13.62 10*3/MM3 (ref 3.4–10.8)

## 2020-09-03 PROCEDURE — 80061 LIPID PANEL: CPT

## 2020-09-03 PROCEDURE — 83036 HEMOGLOBIN GLYCOSYLATED A1C: CPT

## 2020-09-03 PROCEDURE — 82043 UR ALBUMIN QUANTITATIVE: CPT

## 2020-09-03 PROCEDURE — 96372 THER/PROPH/DIAG INJ SC/IM: CPT | Performed by: NURSE PRACTITIONER

## 2020-09-03 PROCEDURE — 80053 COMPREHEN METABOLIC PANEL: CPT

## 2020-09-03 PROCEDURE — 84443 ASSAY THYROID STIM HORMONE: CPT

## 2020-09-03 PROCEDURE — 84403 ASSAY OF TOTAL TESTOSTERONE: CPT

## 2020-09-03 PROCEDURE — 85025 COMPLETE CBC W/AUTO DIFF WBC: CPT

## 2020-09-03 PROCEDURE — 82570 ASSAY OF URINE CREATININE: CPT

## 2020-09-03 PROCEDURE — 84402 ASSAY OF FREE TESTOSTERONE: CPT

## 2020-09-03 RX ADMIN — TESTOSTERONE CYPIONATE 400 MG: 200 INJECTION, SOLUTION INTRAMUSCULAR at 09:46

## 2020-09-05 LAB
TESTOST FREE SERPL-MCNC: 5.4 PG/ML (ref 6.6–18.1)
TESTOST SERPL-MCNC: 154 NG/DL (ref 264–916)

## 2020-09-09 ENCOUNTER — TELEPHONE (OUTPATIENT)
Dept: FAMILY MEDICINE CLINIC | Facility: CLINIC | Age: 70
End: 2020-09-09

## 2020-09-09 NOTE — TELEPHONE ENCOUNTER
Per GEMINI Fuentes, Mr. Ramsay has been called with recent lab results & recommendations.  Continue current medications and follow-up as planned or sooner if any problems.      ----- Message from GEMINI Red sent at 9/8/2020 10:12 AM CDT -----  Testosterone levels remain low.  He can continue testosterone injections as prescribed.  Total cholesterol is within normal limits but triglycerides are now elevated at 211.  Good cholesterol continues to decline and is now down to 29.  He needs to increase exercises such as walking and adhere to a low-fat diet.  He needs to continue on his Pravachol and Vascepa  as previously prescribed.  His white blood cell count was elevated at 13.62.  This could be due to viral process such as common cold, cough but it has been elevated for the last several months.  At his next appointment when we recheck his blood work if it is still elevated I would like to refer him to a hematologist for further evaluation.  All other labs were essentially normal

## 2020-09-16 ENCOUNTER — TELEPHONE (OUTPATIENT)
Dept: FAMILY MEDICINE CLINIC | Facility: CLINIC | Age: 70
End: 2020-09-16

## 2020-09-16 DIAGNOSIS — N52.9 ERECTILE DYSFUNCTION, UNSPECIFIED ERECTILE DYSFUNCTION TYPE: ICD-10-CM

## 2020-09-16 RX ORDER — SILDENAFIL CITRATE 20 MG/1
TABLET ORAL
Qty: 90 TABLET | Refills: 1 | Status: SHIPPED | OUTPATIENT
Start: 2020-09-16 | End: 2022-02-21

## 2020-09-16 NOTE — TELEPHONE ENCOUNTER
Elvira,    Mr. Hung Ramsay is requesting the amount of his Sildenafil be increased,  Right now he is getting a 30 day supply.  I assume he is wanting a 60 or 90 day supply    Please Advise    PEEWEE Mace             ----- Message from Hung Ramsay sent at 9/16/2020  3:47 PM CDT -----  Regarding: Prescription Question  Contact: [phone]  Can you increase  nbr of pills per prescription  on my soldenifil.

## 2020-09-18 DIAGNOSIS — E11.9 TYPE 2 DIABETES MELLITUS WITHOUT COMPLICATION, WITHOUT LONG-TERM CURRENT USE OF INSULIN (HCC): ICD-10-CM

## 2020-09-21 RX ORDER — GLIPIZIDE 5 MG/1
5 TABLET ORAL
Qty: 60 TABLET | Refills: 0 | Status: SHIPPED | OUTPATIENT
Start: 2020-09-21 | End: 2020-11-23 | Stop reason: SDUPTHER

## 2020-09-29 DIAGNOSIS — E78.1 HYPERTRIGLYCERIDEMIA: ICD-10-CM

## 2020-09-29 RX ORDER — ICOSAPENT ETHYL 1000 MG/1
CAPSULE ORAL
Qty: 120 CAPSULE | Refills: 2 | Status: SHIPPED | OUTPATIENT
Start: 2020-09-29 | End: 2021-01-04

## 2020-10-07 DIAGNOSIS — N52.9 ERECTILE DYSFUNCTION, UNSPECIFIED ERECTILE DYSFUNCTION TYPE: ICD-10-CM

## 2020-10-07 RX ORDER — TESTOSTERONE CYPIONATE 200 MG/ML
400 INJECTION, SOLUTION INTRAMUSCULAR
Qty: 2 ML | Refills: 0 | Status: SHIPPED | OUTPATIENT
Start: 2020-10-07 | End: 2020-11-09 | Stop reason: SDUPTHER

## 2020-10-08 ENCOUNTER — CLINICAL SUPPORT (OUTPATIENT)
Dept: FAMILY MEDICINE CLINIC | Facility: CLINIC | Age: 70
End: 2020-10-08

## 2020-10-08 DIAGNOSIS — E29.1 TESTICULAR HYPOFUNCTION: ICD-10-CM

## 2020-10-08 PROCEDURE — 96372 THER/PROPH/DIAG INJ SC/IM: CPT | Performed by: NURSE PRACTITIONER

## 2020-10-08 RX ADMIN — TESTOSTERONE CYPIONATE 400 MG: 200 INJECTION, SOLUTION INTRAMUSCULAR at 10:31

## 2020-10-22 DIAGNOSIS — I10 ESSENTIAL HYPERTENSION: ICD-10-CM

## 2020-10-22 RX ORDER — HYDRALAZINE HYDROCHLORIDE 25 MG/1
25 TABLET, FILM COATED ORAL 2 TIMES DAILY
Qty: 180 TABLET | Refills: 1 | Status: SHIPPED | OUTPATIENT
Start: 2020-10-22

## 2020-10-22 RX ORDER — LOSARTAN POTASSIUM AND HYDROCHLOROTHIAZIDE 25; 100 MG/1; MG/1
TABLET ORAL
Qty: 90 TABLET | Refills: 1 | Status: SHIPPED | OUTPATIENT
Start: 2020-10-22 | End: 2021-08-02

## 2020-11-07 DIAGNOSIS — N52.9 ERECTILE DYSFUNCTION, UNSPECIFIED ERECTILE DYSFUNCTION TYPE: ICD-10-CM

## 2020-11-09 DIAGNOSIS — M25.562 CHRONIC PAIN OF LEFT KNEE: Primary | ICD-10-CM

## 2020-11-09 DIAGNOSIS — G89.29 CHRONIC PAIN OF LEFT KNEE: Primary | ICD-10-CM

## 2020-11-09 DIAGNOSIS — N52.9 ERECTILE DYSFUNCTION, UNSPECIFIED ERECTILE DYSFUNCTION TYPE: ICD-10-CM

## 2020-11-09 RX ORDER — TESTOSTERONE CYPIONATE 200 MG/ML
400 INJECTION, SOLUTION INTRAMUSCULAR
Qty: 2 ML | Refills: 0 | OUTPATIENT
Start: 2020-11-09

## 2020-11-09 RX ORDER — TESTOSTERONE CYPIONATE 200 MG/ML
400 INJECTION, SOLUTION INTRAMUSCULAR
Qty: 2 ML | Refills: 0 | Status: SHIPPED | OUTPATIENT
Start: 2020-11-09 | End: 2020-11-23 | Stop reason: SDUPTHER

## 2020-11-10 NOTE — PROGRESS NOTES
Pt scheduled for nuclear stress test, instructions given, verbalized understanding  
The patient has been re-examined and I agree with the above assessment or I updated with my findings.

## 2020-11-12 ENCOUNTER — CLINICAL SUPPORT (OUTPATIENT)
Dept: FAMILY MEDICINE CLINIC | Facility: CLINIC | Age: 70
End: 2020-11-12

## 2020-11-12 DIAGNOSIS — F33.0 MILD EPISODE OF RECURRENT MAJOR DEPRESSIVE DISORDER (HCC): ICD-10-CM

## 2020-11-12 DIAGNOSIS — Z23 FLU VACCINE NEED: Primary | ICD-10-CM

## 2020-11-12 DIAGNOSIS — E11.9 TYPE 2 DIABETES MELLITUS WITHOUT COMPLICATION, WITHOUT LONG-TERM CURRENT USE OF INSULIN (HCC): ICD-10-CM

## 2020-11-12 PROCEDURE — 90694 VACC AIIV4 NO PRSRV 0.5ML IM: CPT | Performed by: NURSE PRACTITIONER

## 2020-11-12 PROCEDURE — 96372 THER/PROPH/DIAG INJ SC/IM: CPT | Performed by: NURSE PRACTITIONER

## 2020-11-12 PROCEDURE — G0008 ADMIN INFLUENZA VIRUS VAC: HCPCS | Performed by: NURSE PRACTITIONER

## 2020-11-12 RX ORDER — ESOMEPRAZOLE MAGNESIUM 40 MG/1
CAPSULE, DELAYED RELEASE ORAL
Qty: 90 CAPSULE | Refills: 1 | Status: SHIPPED | OUTPATIENT
Start: 2020-11-12 | End: 2021-07-12

## 2020-11-12 RX ORDER — PAROXETINE HYDROCHLORIDE HEMIHYDRATE 37.5 MG/1
TABLET, FILM COATED, EXTENDED RELEASE ORAL
Qty: 90 TABLET | Refills: 3 | Status: SHIPPED | OUTPATIENT
Start: 2020-11-12 | End: 2021-11-22

## 2020-11-12 RX ADMIN — TESTOSTERONE CYPIONATE 400 MG: 200 INJECTION, SOLUTION INTRAMUSCULAR at 14:17

## 2020-11-17 ENCOUNTER — OFFICE VISIT (OUTPATIENT)
Dept: ORTHOPEDIC SURGERY | Facility: CLINIC | Age: 70
End: 2020-11-17

## 2020-11-17 VITALS — WEIGHT: 202.9 LBS | BODY MASS INDEX: 30.75 KG/M2 | HEIGHT: 68 IN

## 2020-11-17 DIAGNOSIS — M25.572 CHRONIC PAIN OF LEFT ANKLE: ICD-10-CM

## 2020-11-17 DIAGNOSIS — M79.605 LEFT LEG PAIN: Primary | ICD-10-CM

## 2020-11-17 DIAGNOSIS — G89.29 CHRONIC PAIN OF LEFT ANKLE: ICD-10-CM

## 2020-11-17 DIAGNOSIS — M25.562 CHRONIC PAIN OF LEFT KNEE: ICD-10-CM

## 2020-11-17 DIAGNOSIS — E11.9 TYPE 2 DIABETES MELLITUS WITHOUT COMPLICATION, WITHOUT LONG-TERM CURRENT USE OF INSULIN (HCC): Primary | ICD-10-CM

## 2020-11-17 DIAGNOSIS — G89.29 CHRONIC PAIN OF LEFT KNEE: ICD-10-CM

## 2020-11-17 DIAGNOSIS — M17.12 PRIMARY LOCALIZED OSTEOARTHROSIS OF LEFT LOWER LEG: ICD-10-CM

## 2020-11-17 PROCEDURE — 20610 DRAIN/INJ JOINT/BURSA W/O US: CPT | Performed by: ORTHOPAEDIC SURGERY

## 2020-11-17 PROCEDURE — 99203 OFFICE O/P NEW LOW 30 MIN: CPT | Performed by: ORTHOPAEDIC SURGERY

## 2020-11-17 RX ORDER — TRIAMCINOLONE ACETONIDE 40 MG/ML
80 INJECTION, SUSPENSION INTRA-ARTICULAR; INTRAMUSCULAR
Status: COMPLETED | OUTPATIENT
Start: 2020-11-17 | End: 2020-11-17

## 2020-11-17 RX ORDER — LIDOCAINE HYDROCHLORIDE AND EPINEPHRINE 10; 10 MG/ML; UG/ML
2 INJECTION, SOLUTION INFILTRATION; PERINEURAL
Status: COMPLETED | OUTPATIENT
Start: 2020-11-17 | End: 2020-11-17

## 2020-11-17 RX ORDER — BUPIVACAINE HYDROCHLORIDE 5 MG/ML
3 INJECTION, SOLUTION PERINEURAL
Status: COMPLETED | OUTPATIENT
Start: 2020-11-17 | End: 2020-11-17

## 2020-11-17 RX ORDER — FLASH GLUCOSE SCANNING READER
1 EACH MISCELLANEOUS
Qty: 2 DEVICE | Refills: 5 | Status: SHIPPED | OUTPATIENT
Start: 2020-11-17 | End: 2021-09-22

## 2020-11-17 RX ORDER — FLASH GLUCOSE SENSOR
KIT MISCELLANEOUS
Qty: 2 EACH | Refills: 4 | Status: SHIPPED | OUTPATIENT
Start: 2020-11-17 | End: 2021-09-22

## 2020-11-17 RX ADMIN — BUPIVACAINE HYDROCHLORIDE 3 ML: 5 INJECTION, SOLUTION PERINEURAL at 09:37

## 2020-11-17 RX ADMIN — TRIAMCINOLONE ACETONIDE 80 MG: 40 INJECTION, SUSPENSION INTRA-ARTICULAR; INTRAMUSCULAR at 09:37

## 2020-11-17 RX ADMIN — LIDOCAINE HYDROCHLORIDE AND EPINEPHRINE 2 ML: 10; 10 INJECTION, SOLUTION INFILTRATION; PERINEURAL at 09:37

## 2020-11-17 NOTE — PROGRESS NOTES
Hung Ramsay is a 69 y.o. male   Primary provider:  Elvira Ramsay APRN       Chief Complaint   Patient presents with   • Left Leg - Initial Evaluation       HISTORY OF PRESENT ILLNESS:  Patient in for initial eval of left leg pain  No pain with sitting, just walking.     This is the first office visit for evaluation of pain in the left lower extremity.    Mr. Ramsay is 69 years old.  He has a longstanding history of problems with the left lower extremity.  There is been no history of trauma.  He complains of pain radiating from the ankle to the posterior lateral aspect of the knee and occasionally to the buttock.  He also has mild occasional pain over the lateral aspect of the hip.  It appears the majority of his pain is over the posterior lateral aspect of the knee worse with standing and stair climbing.  He denies any numbness or tingling in the limb.  His pain improved somewhat with prolonged walking.  He has had no treatment for this.  He has noticed that he tends to walk on the lateral side of his left foot and this has been a chronic condition.    Home medications include carbidopa dulaglutide Nexium Glucotrol hydralazine losartan Metformin Paxil Pravachol sildenafil testosterone and trazodone.  He has no drug allergies.  He does not smoke.  Past medical history is remarkable for hypertension diabetes reflux and anxiety.  He is retired and .    GEMINI Núñez has asked that I see him for evaluation and treatment.      Leg Pain   Incident onset: 4 months ago. The pain is present in the left leg, left thigh, left hip, left knee and left ankle. The quality of the pain is described as stabbing. The pain is moderate. He reports no foreign bodies present. Exacerbated by: standing. He has tried nothing for the symptoms.        CONCURRENT MEDICAL HISTORY:    Past Medical History:   Diagnosis Date   • Anxiety    • Diabetes (CMS/[facility])    • Essential hypertension    • GERD (gastroesophageal reflux disease)     • Hyperlipidemia    • Insomnia    • Type 2 diabetes mellitus (CMS/HCC)        No Known Allergies      Current Outpatient Medications:   •  aspirin 81 MG EC tablet, Take 81 mg by mouth Daily., Disp: , Rfl:   •  carbidopa (LODOSYN) 25 MG tablet, Take 1 tablet by mouth 3 (Three) Times a Day., Disp: 90 tablet, Rfl: 3  •  diclofenac (VOLTAREN) 75 MG EC tablet, Take 1 tablet by mouth 2 (Two) Times a Day As Needed (pain)., Disp: 180 tablet, Rfl: 2  •  dicyclomine (Bentyl) 20 MG tablet, Take 1 tablet by mouth Every 6 (Six) Hours As Needed (ABD CRAMPING)., Disp: 20 tablet, Rfl: 0  •  Dulaglutide 0.75 MG/0.5ML solution pen-injector, Inject 0.75 mg under the skin into the appropriate area as directed 1 (One) Time Per Week., Disp: 4 pen, Rfl: 12  •  esomeprazole (nexIUM) 40 MG capsule, TAKE 1 CAPSULE BY MOUTH EVERY DAY IN THE MORNING BEFORE BREAKFAST, Disp: 90 capsule, Rfl: 1  •  glipizide (GLUCOTROL) 5 MG tablet, TAKE 1 TABLET BY MOUTH 2 (TWO) TIMES A DAY BEFORE MEALS., Disp: 60 tablet, Rfl: 0  •  hydrALAZINE (APRESOLINE) 25 MG tablet, Take 1 tablet by mouth 2 (Two) Times a Day., Disp: 180 tablet, Rfl: 1  •  losartan-hydrochlorothiazide (HYZAAR) 100-25 MG per tablet, TAKE 1 TABLET BY MOUTH EVERY DAY, Disp: 90 tablet, Rfl: 1  •  metFORMIN (GLUCOPHAGE) 1000 MG tablet, TAKE 1 TABLET BY MOUTH TWICE A DAY WITH MEALS, Disp: 180 tablet, Rfl: 1  •  PARoxetine CR (PAXIL-CR) 37.5 MG 24 hr tablet, TAKE 1 TABLET BY MOUTH EVERY DAY IN THE MORNING, Disp: 90 tablet, Rfl: 3  •  pravastatin (PRAVACHOL) 40 MG tablet, TAKE 1 TABLET BY MOUTH EVERYDAY AT BEDTIME, Disp: 90 tablet, Rfl: 2  •  promethazine (PHENERGAN) 25 MG tablet, Take 1 tablet by mouth Every 6 (Six) Hours As Needed for Nausea or Vomiting., Disp: 10 tablet, Rfl: 0  •  sildenafil (REVATIO) 20 MG tablet, Use 1-5 tablets as needed for erectile dysfunction. Do not exceed 100mg in 24 hours., Disp: 90 tablet, Rfl: 1  •  Testosterone Cypionate (DEPOTESTOTERONE CYPIONATE) 200 MG/ML  "injection, Inject 2 mL into the appropriate muscle as directed by prescriber Every 28 (Twenty-Eight) Days., Disp: 2 mL, Rfl: 0  •  traZODone (DESYREL) 50 MG tablet, TAKE 1 TABLET BY MOUTH EVERY DAY AT NIGHT, Disp: 90 tablet, Rfl: 1  •  Vascepa 1 g capsule capsule, TAKE 2 CAPSULES BY MOUTH 2 TIMES A DAY WITH MEALS., Disp: 120 capsule, Rfl: 2    Current Facility-Administered Medications:   •  Testosterone Cypionate (DEPOTESTOTERONE CYPIONATE) injection 400 mg, 400 mg, Intramuscular, Q30 Days, Elvira Ramsay, APRN, 400 mg at 11/12/20 1417    Past Surgical History:   Procedure Laterality Date   • COLECTOMY PARTIAL / TOTAL     • COLONOSCOPY N/A 5/8/2019    Procedure: COLONOSCOPY;  Surgeon: Cesar Santos MD;  Location: [facility] ENDOSCOPY;  Service: Gastroenterology   • ENDOSCOPY AND COLONOSCOPY  04/19/2012       Family History   Problem Relation Age of Onset   • Diabetes Mother    • Hypertension Mother    • Heart disease Mother    • Cancer Father        Social History     Socioeconomic History   • Marital status:      Spouse name: Not on file   • Number of children: Not on file   • Years of education: Not on file   • Highest education level: Not on file   Tobacco Use   • Smoking status: Former Smoker   • Smokeless tobacco: Former User   • Tobacco comment: quit 50 yrs ago   Substance and Sexual Activity   • Alcohol use: No   • Drug use: No   • Sexual activity: Defer        Review of Systems   Musculoskeletal:        Stiffness and muscle pain   Psychiatric/Behavioral: Positive for sleep disturbance.        Anxiety and depression   All other systems reviewed and are negative.    Review of systems is positive as noted above.      PHYSICAL EXAMINATION:       Vitals:    11/17/20 0907   Weight: 92 kg (202 lb 14.4 oz)   Height: 172.7 cm (68\")   PainSc: 0-No pain       Physical Exam in general he is alert and in no apparent distress.  He responds appropriately to questions and commands.    GAIT:     []  Normal  [x]  " Antalgic    Assistive device: [x]  None  []  Walker     []  Crutches  []  Cane     []  Wheelchair  []  Stretcher    Ortho Exam walks with a slightly antalgic gait favoring the left.  There is moderate varus of the left foot and ankle.  The heel appears to invert slightly with tiptoe standing.  Ankle motion is full on the left.  Total subtalar motion is 20 to 30 degrees and is smooth and produces little apparent pain.  Motion of the left knee is from 5 to 125 degrees.  There is no detectable effusion.  Motion of the hip is smooth and painless.  The left lower extremity is shorter than the right by 1/2 cm.  There is no patellofemoral crepitus.  There is no instability in varus and valgus stress.  Carlyn testing produces no crepitus and no apparent pain.  Dorsalis pedis pulse is strong.  Sensory exam is intact to soft touch.    Radiographs of the knee done recently show a prominent varus of the talus with marked narrowing of the tibiotalar joint space.  I see no fractures.  Radiographs of the knee show mild tricompartmental degenerative change which has progressed slightly since previous studies.    Exam: Four views left knee     INDICATION: Pain     FINDINGS: Four views. No acute fracture or dislocation. Mild  narrowing of the medial joint compartment. Mild posterior  patellar spurring No lytic or destructive lesion. No obvious  joint effusion.     IMPRESSION:  Mild degenerative change. No acute bony abnormality.     Electronically signed by:  Taran Colin MD  8/27/2020 9:53 PM  CDT Workstation: [id number]    EXAM:  Radiograph(s), Osseous         REGION:   Ankle    SIDE:     Left     VIEWS:   3             INDICATION:    left ankle issue, M25.572 Pain in left ankle and  joints of left foot     COMPARISON:    none              FINDINGS:           There is no evidence of an acute fracture.  There is distortion of the ankle mortise with inversion  orientation, presumably chronic  multifocal degenerative osteophyte  production..  .      IMPRESSION:  CONCLUSION:           1. Degenerative changes with apparent chronic inversion  orientation of the ankle mortise.              Note:  if pain or symptoms persist beyond reasonable expectations  and follow-up imaging is anticipated,  cross sectional imaging  (MRI) is suggested, as is deemed clinically appropriate.                         Electronically signed by:  JUANA Vuong MD  6/18/2018 4:17  PM CDT Workstation: [id number]        ASSESSMENT: 1 osteoarthritis left knee.  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  3 leg length inequality.    He denies any pain associated with the ankle.  It is uncertain if his leg length inequality is causing any of his current symptoms.    Treatment options were reviewed.  At this point I see no surgical indications.  He is not interested in having any more aggressive treatment for his ankle.  Potential benefits of injection therapy for the knee were discussed.  He wished to proceed with this.    The left knee was prepped.  Skin was infiltrated with 1% Xylocaine with epinephrine.  The knee was then injected with a mixture of Marcaine Kenalog and Xylocaine with epinephrine.  There were no complications.  After the injection he reported improvement in his pain with weightbearing.    Return here in 1 month if his symptoms have not improved.    Diagnoses and all orders for this visit:    Left leg pain  -     Large Joint Arthrocentesis: L knee    Chronic pain of left knee  -     Large Joint Arthrocentesis: L knee    Primary localized osteoarthrosis of left lower leg    Chronic pain of left ankle      Large Joint Arthrocentesis: L knee  Date/Time: 11/17/2020 9:37 AM  Consent given by: patient  Site marked: site marked  Timeout: Immediately prior to procedure a time out was called to verify the correct patient, procedure, equipment, support staff and site/side marked as required   Supporting  Documentation  Indications: pain   Procedure Details  Location: knee - L knee  Preparation: Patient was prepped and draped in the usual sterile fashion  Needle size: 22 G  Approach: anteromedial  Medications administered: 2 mL lidocaine-EPINEPHrine 1 %-1:[id number]; 3 mL bupivacaine 0.5 %; 80 mg triamcinolone acetonide 40 MG/ML  Patient tolerance: patient tolerated the procedure well with no immediate complications        PLAN    Return in about 4 weeks (around 12/15/2020).    Fadi Mercado MD

## 2020-11-23 ENCOUNTER — OFFICE VISIT (OUTPATIENT)
Dept: FAMILY MEDICINE CLINIC | Facility: CLINIC | Age: 70
End: 2020-11-23

## 2020-11-23 VITALS
SYSTOLIC BLOOD PRESSURE: 134 MMHG | HEIGHT: 68 IN | WEIGHT: 203.6 LBS | DIASTOLIC BLOOD PRESSURE: 74 MMHG | BODY MASS INDEX: 30.86 KG/M2

## 2020-11-23 DIAGNOSIS — E11.9 TYPE 2 DIABETES MELLITUS WITHOUT COMPLICATION, WITHOUT LONG-TERM CURRENT USE OF INSULIN (HCC): Primary | ICD-10-CM

## 2020-11-23 DIAGNOSIS — F33.0 MILD EPISODE OF RECURRENT MAJOR DEPRESSIVE DISORDER (HCC): ICD-10-CM

## 2020-11-23 DIAGNOSIS — N52.9 ERECTILE DYSFUNCTION, UNSPECIFIED ERECTILE DYSFUNCTION TYPE: ICD-10-CM

## 2020-11-23 DIAGNOSIS — Z23 NEED FOR 23-POLYVALENT PNEUMOCOCCAL POLYSACCHARIDE VACCINE: ICD-10-CM

## 2020-11-23 PROCEDURE — 90732 PPSV23 VACC 2 YRS+ SUBQ/IM: CPT | Performed by: NURSE PRACTITIONER

## 2020-11-23 PROCEDURE — G0009 ADMIN PNEUMOCOCCAL VACCINE: HCPCS | Performed by: NURSE PRACTITIONER

## 2020-11-23 PROCEDURE — 99213 OFFICE O/P EST LOW 20 MIN: CPT | Performed by: NURSE PRACTITIONER

## 2020-11-23 RX ORDER — BLOOD-GLUCOSE METER
1 KIT MISCELLANEOUS DAILY
Qty: 1 EACH | Refills: 0 | Status: SHIPPED | OUTPATIENT
Start: 2020-11-23

## 2020-11-23 RX ORDER — GLIPIZIDE 5 MG/1
5 TABLET ORAL
Qty: 180 TABLET | Refills: 3 | Status: SHIPPED | OUTPATIENT
Start: 2020-11-23 | End: 2021-11-29

## 2020-11-23 RX ORDER — TESTOSTERONE CYPIONATE 200 MG/ML
400 INJECTION, SOLUTION INTRAMUSCULAR
Qty: 2 ML | Refills: 0 | Status: SHIPPED | OUTPATIENT
Start: 2020-11-23 | End: 2021-01-15

## 2020-11-23 NOTE — PROGRESS NOTES
Subjective   Hung Ramsay is a 69 y.o. male.  Remote follow-up for diabetes, depression and erectile dysfunction.  Needs refill on his testosterone and new glucometer and test strips.    Depression  Visit Type: follow-up  Patient presents with the following symptoms: depressed mood and nervousness/anxiety.  Patient is not experiencing: confusion, feelings of hopelessness, feelings of worthlessness, palpitations, panic and shortness of breath.  Frequency of symptoms: occasionally   Severity: mild   Sleep per night: 8 hours  Sleep quality: good  Nighttime awakenings: occasional  Compliance with medications:  %        Erectile Dysfunction  This is a chronic problem. The current episode started more than 1 year ago. The problem has been gradually worsening since onset. The nature of his difficulty is achieving erection. Non-physiologic factors contributing to erectile dysfunction are anxiety. Obstructive symptoms include an intermittent stream, a slower stream, straining and a weak stream. Pertinent negatives include no chills. The symptoms are aggravated by poor sleep and stress. Past treatments include sildenafil. The treatment provided no relief.   Diabetes  He presents for his follow-up diabetic visit. He has type 2 diabetes mellitus. His disease course has been stable. Hypoglycemia symptoms include nervousness/anxiousness. Pertinent negatives for hypoglycemia include no confusion. Pertinent negatives for diabetes include no fatigue. There are no hypoglycemic complications. Symptoms are stable. There are no diabetic complications. Risk factors for coronary artery disease include male sex, obesity, sedentary lifestyle, family history and diabetes mellitus. Current diabetic treatment includes oral agent (dual therapy). He is compliant with treatment all of the time.        The following portions of the patient's history were reviewed and updated as appropriate:   Current Outpatient Medications    Medication Sig Dispense Refill   • aspirin 81 MG EC tablet Take 81 mg by mouth Daily.     • carbidopa (LODOSYN) 25 MG tablet Take 1 tablet by mouth 3 (Three) Times a Day. 90 tablet 3   • Continuous Blood Gluc  (FreeStyle Arianna 14 Day [place]) device 1 Device Every 14 (Fourteen) Days. 2 Device 5   • Continuous Blood Gluc Sensor (FreeStyle Arianna Sensor System) Every 14 (Fourteen) Days. 2 each 4   • diclofenac (VOLTAREN) 75 MG EC tablet Take 1 tablet by mouth 2 (Two) Times a Day As Needed (pain). 180 tablet 2   • dicyclomine (Bentyl) 20 MG tablet Take 1 tablet by mouth Every 6 (Six) Hours As Needed (ABD CRAMPING). 20 tablet 0   • esomeprazole (nexIUM) 40 MG capsule TAKE 1 CAPSULE BY MOUTH EVERY DAY IN THE MORNING BEFORE BREAKFAST 90 capsule 1   • glipizide (GLUCOTROL) 5 MG tablet Take 1 tablet by mouth 2 (Two) Times a Day Before Meals. 180 tablet 3   • hydrALAZINE (APRESOLINE) 25 MG tablet Take 1 tablet by mouth 2 (Two) Times a Day. 180 tablet 1   • losartan-hydrochlorothiazide (HYZAAR) 100-25 MG per tablet TAKE 1 TABLET BY MOUTH EVERY DAY 90 tablet 1   • metFORMIN (GLUCOPHAGE) 1000 MG tablet TAKE 1 TABLET BY MOUTH TWICE A DAY WITH MEALS 180 tablet 1   • PARoxetine CR (PAXIL-CR) 37.5 MG 24 hr tablet TAKE 1 TABLET BY MOUTH EVERY DAY IN THE MORNING 90 tablet 3   • pravastatin (PRAVACHOL) 40 MG tablet TAKE 1 TABLET BY MOUTH EVERYDAY AT BEDTIME 90 tablet 2   • promethazine (PHENERGAN) 25 MG tablet Take 1 tablet by mouth Every 6 (Six) Hours As Needed for Nausea or Vomiting. 10 tablet 0   • sildenafil (REVATIO) 20 MG tablet Use 1-5 tablets as needed for erectile dysfunction. Do not exceed 100mg in 24 hours. 90 tablet 1   • Testosterone Cypionate (DEPOTESTOTERONE CYPIONATE) 200 MG/ML injection Inject 2 mL into the appropriate muscle as directed by prescriber Every 28 (Twenty-Eight) Days. 2 mL 0   • traZODone (DESYREL) 50 MG tablet TAKE 1 TABLET BY MOUTH EVERY DAY AT NIGHT 90 tablet 1   • Vascepa 1 g capsule capsule  TAKE 2 CAPSULES BY MOUTH 2 TIMES A DAY WITH MEALS. 120 capsule 2   • glucose blood test strip Use as instructed 100 each 3   • glucose monitor monitoring kit 1 each Daily. 1 each 0     Current Facility-Administered Medications   Medication Dose Route Frequency Provider Last Rate Last Admin   • Testosterone Cypionate (DEPOTESTOTERONE CYPIONATE) injection 400 mg  400 mg Intramuscular Q30 Days Elvira Ramsay, APRN   400 mg at 11/12/20 1417     Current Outpatient Medications on File Prior to Visit   Medication Sig   • aspirin 81 MG EC tablet Take 81 mg by mouth Daily.   • carbidopa (LODOSYN) 25 MG tablet Take 1 tablet by mouth 3 (Three) Times a Day.   • Continuous Blood Gluc  (FreeStyle Arianna 14 Day [place]) device 1 Device Every 14 (Fourteen) Days.   • Continuous Blood Gluc Sensor (FreeStyle Arianna Sensor System) Every 14 (Fourteen) Days.   • diclofenac (VOLTAREN) 75 MG EC tablet Take 1 tablet by mouth 2 (Two) Times a Day As Needed (pain).   • dicyclomine (Bentyl) 20 MG tablet Take 1 tablet by mouth Every 6 (Six) Hours As Needed (ABD CRAMPING).   • esomeprazole (nexIUM) 40 MG capsule TAKE 1 CAPSULE BY MOUTH EVERY DAY IN THE MORNING BEFORE BREAKFAST   • hydrALAZINE (APRESOLINE) 25 MG tablet Take 1 tablet by mouth 2 (Two) Times a Day.   • losartan-hydrochlorothiazide (HYZAAR) 100-25 MG per tablet TAKE 1 TABLET BY MOUTH EVERY DAY   • metFORMIN (GLUCOPHAGE) 1000 MG tablet TAKE 1 TABLET BY MOUTH TWICE A DAY WITH MEALS   • PARoxetine CR (PAXIL-CR) 37.5 MG 24 hr tablet TAKE 1 TABLET BY MOUTH EVERY DAY IN THE MORNING   • pravastatin (PRAVACHOL) 40 MG tablet TAKE 1 TABLET BY MOUTH EVERYDAY AT BEDTIME   • promethazine (PHENERGAN) 25 MG tablet Take 1 tablet by mouth Every 6 (Six) Hours As Needed for Nausea or Vomiting.   • sildenafil (REVATIO) 20 MG tablet Use 1-5 tablets as needed for erectile dysfunction. Do not exceed 100mg in 24 hours.   • traZODone (DESYREL) 50 MG tablet TAKE 1 TABLET BY MOUTH EVERY DAY AT NIGHT   •  "Vascepa 1 g capsule capsule TAKE 2 CAPSULES BY MOUTH 2 TIMES A DAY WITH MEALS.   • [DISCONTINUED] glipizide (GLUCOTROL) 5 MG tablet TAKE 1 TABLET BY MOUTH 2 (TWO) TIMES A DAY BEFORE MEALS.   • [DISCONTINUED] Testosterone Cypionate (DEPOTESTOTERONE CYPIONATE) 200 MG/ML injection Inject 2 mL into the appropriate muscle as directed by prescriber Every 28 (Twenty-Eight) Days.   • [DISCONTINUED] Dulaglutide 0.75 MG/0.5ML solution pen-injector Inject 0.75 mg under the skin into the appropriate area as directed 1 (One) Time Per Week.     Current Facility-Administered Medications on File Prior to Visit   Medication   • Testosterone Cypionate (DEPOTESTOTERONE CYPIONATE) injection 400 mg     He has No Known Allergies..    Review of Systems   Constitutional: Negative.  Negative for chills, diaphoresis, fatigue and fever.   HENT: Negative.  Negative for sore throat.    Respiratory: Negative.  Negative for cough and shortness of breath.    Cardiovascular: Negative.  Negative for palpitations.   Gastrointestinal: Negative.    Musculoskeletal: Negative.    Skin: Negative.    Neurological: Negative.    Psychiatric/Behavioral: Negative for confusion. The patient is nervous/anxious.        Objective    Visit Vitals  /74   Ht 172.7 cm (68\")   Wt 92.4 kg (203 lb 9.6 oz)   BMI 30.96 kg/m²       Physical Exam  Vitals signs and nursing note reviewed.   Constitutional:       Appearance: He is well-developed.   HENT:      Head: Normocephalic.   Neck:      Musculoskeletal: Normal range of motion and neck supple.   Cardiovascular:      Rate and Rhythm: Normal rate and regular rhythm.      Heart sounds: Normal heart sounds.   Pulmonary:      Effort: Pulmonary effort is normal.      Breath sounds: Normal breath sounds.   Abdominal:      General: Bowel sounds are normal.      Palpations: Abdomen is soft.   Musculoskeletal: Normal range of motion.   Skin:     General: Skin is warm.   Neurological:      Mental Status: He is alert and " oriented to person, place, and time.   Psychiatric:         Behavior: Behavior normal.         Assessment/Plan   Problems Addressed this Visit        Endocrine    Diabetes (CMS/[facility]) - Primary    Relevant Medications    glipizide (GLUCOTROL) 5 MG tablet    glucose blood test strip    glucose monitor monitoring kit      Other Visit Diagnoses     Mild episode of recurrent major depressive disorder (CMS/[facility])        Erectile dysfunction, unspecified erectile dysfunction type        Relevant Medications    Testosterone Cypionate (DEPOTESTOTERONE CYPIONATE) 200 MG/ML injection    Need for 23-polyvalent pneumococcal polysaccharide vaccine        Relevant Orders    Pneumococcal Polysaccharide Vaccine 23-Valent Greater Than or Equal To 1yo Subcutaneous / IM (Completed)      Diagnoses       Codes Comments    Type 2 diabetes mellitus without complication, without long-term current use of insulin (CMS/[facility])    -  Primary ICD-10-CM: E11.9  ICD-9-CM: 250.00     Mild episode of recurrent major depressive disorder (CMS/[facility])     ICD-10-CM: F33.0  ICD-9-CM: 296.31     Erectile dysfunction, unspecified erectile dysfunction type     ICD-10-CM: N52.9  ICD-9-CM: 607.84     Need for 23-polyvalent pneumococcal polysaccharide vaccine     ICD-10-CM: Z23  ICD-9-CM: V03.82         New Medications Ordered This Visit   Medications   • glipizide (GLUCOTROL) 5 MG tablet     Sig: Take 1 tablet by mouth 2 (Two) Times a Day Before Meals.     Dispense:  180 tablet     Refill:  3   • Testosterone Cypionate (DEPOTESTOTERONE CYPIONATE) 200 MG/ML injection     Sig: Inject 2 mL into the appropriate muscle as directed by prescriber Every 28 (Twenty-Eight) Days.     Dispense:  2 mL     Refill:  0     Covering for Elvira Ramsay further per her   • glucose blood test strip     Sig: Use as instructed     Dispense:  100 each     Refill:  3     Please run through his insurance for coverage of brand   • glucose monitor monitoring kit     Si each Daily.      Dispense:  1 each     Refill:  0     Please run through his insurance for coverage     1.  Diabetes:  Continue on glipizide and Glucophage as previously prescribed and refill glipizide sent to pharmacy  New prescription for glucometer and test strips sent to pharmacy  Encouraged to continue to adhere to an ADA diet and increase exercise such as walking    2.  Mild episode of recurrent major depressive disorder:  Continue on Paxil as previously prescribed  Discussed at length how regular physical activity regimen can help to improve mental health  Encouraged to seek emergency medical treatment for any new or worsening thoughts of hopelessness, helplessness or self-harm    3.  Erectile dysfunction, unspecified erectile dysfunction type:  Continue on testosterone cypionate as previously prescribed and refill prescription sent to pharmacy  Reeducated on possible side effects of this medication including not limited to increased risk for mood swings, irritability and irregular hair growth    4.  Need for 23 polyvalent pneumococcal polysaccharide vaccine:  Pneumovax 23 given IM in office  Educated on possible side effects of this medication including not limited to increased risk for pain, swelling and redness of injection site    Continue on current medications as previously prescribed   I spent 20 minutes in direct face to face contact with patient.  Greater than 50% of this time was spent counseling patient and discussing plan of care.  Return in about 3 months (around 2/23/2021) for Medicare Wellness.        This document has been electronically signed by GEMINI Red on November 23, 2020 09:42 CST

## 2020-12-01 DIAGNOSIS — R25.1 TREMOR: ICD-10-CM

## 2020-12-01 RX ORDER — CARBIDOPA 25 MG/1
TABLET ORAL
Qty: 90 TABLET | Refills: 3 | Status: SHIPPED | OUTPATIENT
Start: 2020-12-01 | End: 2021-04-07

## 2020-12-15 ENCOUNTER — OFFICE VISIT (OUTPATIENT)
Dept: ORTHOPEDIC SURGERY | Facility: CLINIC | Age: 70
End: 2020-12-15

## 2020-12-15 VITALS — HEIGHT: 68 IN | HEART RATE: 65 BPM | OXYGEN SATURATION: 98 % | BODY MASS INDEX: 31.11 KG/M2 | WEIGHT: 205.3 LBS

## 2020-12-15 DIAGNOSIS — M17.12 PRIMARY LOCALIZED OSTEOARTHROSIS OF LEFT LOWER LEG: ICD-10-CM

## 2020-12-15 DIAGNOSIS — M79.605 LEFT LEG PAIN: Primary | ICD-10-CM

## 2020-12-15 DIAGNOSIS — M25.572 CHRONIC PAIN OF LEFT ANKLE: ICD-10-CM

## 2020-12-15 DIAGNOSIS — G89.29 CHRONIC PAIN OF LEFT KNEE: ICD-10-CM

## 2020-12-15 DIAGNOSIS — M25.562 CHRONIC PAIN OF LEFT KNEE: ICD-10-CM

## 2020-12-15 DIAGNOSIS — G89.29 CHRONIC PAIN OF LEFT ANKLE: ICD-10-CM

## 2020-12-15 PROCEDURE — 99213 OFFICE O/P EST LOW 20 MIN: CPT | Performed by: ORTHOPAEDIC SURGERY

## 2020-12-15 NOTE — PROGRESS NOTES
"Hung Ramsay is a 70 y.o. male returns for     Chief Complaint   Patient presents with   • Left Knee - Follow-up       HISTORY OF PRESENT ILLNESS: Patient being seen for left knee follow up. Injection given 11/17/2020, which helped some with pain.     Mr. Ramsay initially had near complete relief of his pain after the injection.  However he has had some continued pain primarily over the lateral aspect of the knee and proximal leg.  He has had no popping.     CONCURRENT MEDICAL HISTORY:    The following portions of the patient's history were reviewed and updated as appropriate: allergies, current medications, past family history, past medical history, past social history, past surgical history and problem list.         PHYSICAL EXAMINATION:       Pulse 65   Ht 172.7 cm (68\")   Wt 93.1 kg (205 lb 4.8 oz)   SpO2 98%   BMI 31.22 kg/m²     Physical Exam he is alert and in no apparent distress.    GAIT:     [x]  Normal  []  Antalgic    Assistive device: [x]  None  []  Walker     []  Crutches  []  Cane     []  Wheelchair  []  Stretcher    Ortho Exam he walks with no apparent pain.  There is mild varus thrust of the knee with weightbearing.  There is a trace to 1+ effusion of the knee but no warmth erythema.  Motion of the left hip is smooth      No results found.    Exam: Four views left knee     INDICATION: Pain     FINDINGS: Four views. No acute fracture or dislocation. Mild  narrowing of the medial joint compartment. Mild posterior  patellar spurring No lytic or destructive lesion. No obvious  joint effusion.     IMPRESSION:  Mild degenerative change. No acute bony abnormality.     Electronically signed by:  Taran Colin MD  8/27/2020 9:53 PM  CDT Workstation: [id number]      ASSESSMENT: Left knee pain secondary to osteoarthritis.  The natural history of disorder was again discussed.  He was instructed in use of over-the-counter topical anti-inflammatories and anesthetics.  He may supplement this with Tylenol " as needed.    Return here in 2 months if his symptoms have not improved.  If his symptoms have not improved I would like to obtain a standing PA view of both knees.    Diagnoses and all orders for this visit:    Left leg pain    Chronic pain of left knee    Primary localized osteoarthrosis of left lower leg    Chronic pain of left ankle          PLAN    Patient's Body mass index is 31.22 kg/m². BMI is above normal parameters. Recommendations include: exercise counseling, nutrition counseling and referral to primary care.      No follow-ups on file.    Fadi Mercado MD

## 2021-01-02 DIAGNOSIS — E78.1 HYPERTRIGLYCERIDEMIA: ICD-10-CM

## 2021-01-04 RX ORDER — ICOSAPENT ETHYL 1000 MG/1
CAPSULE ORAL
Qty: 120 CAPSULE | Refills: 2 | Status: SHIPPED | OUTPATIENT
Start: 2021-01-04 | End: 2021-08-16

## 2021-01-15 DIAGNOSIS — N52.9 ERECTILE DYSFUNCTION, UNSPECIFIED ERECTILE DYSFUNCTION TYPE: ICD-10-CM

## 2021-01-15 RX ORDER — TESTOSTERONE CYPIONATE 200 MG/ML
400 INJECTION, SOLUTION INTRAMUSCULAR
Qty: 2 ML | Refills: 0 | Status: SHIPPED | OUTPATIENT
Start: 2021-01-15 | End: 2021-09-22

## 2021-01-27 ENCOUNTER — IMMUNIZATION (OUTPATIENT)
Dept: VACCINE CLINIC | Facility: HOSPITAL | Age: 71
End: 2021-01-27

## 2021-01-27 PROCEDURE — 0001A: CPT | Performed by: THORACIC SURGERY (CARDIOTHORACIC VASCULAR SURGERY)

## 2021-01-27 PROCEDURE — 91300 HC SARSCOV02 VAC 30MCG/0.3ML IM: CPT | Performed by: THORACIC SURGERY (CARDIOTHORACIC VASCULAR SURGERY)

## 2021-02-15 DIAGNOSIS — M25.562 CHRONIC PAIN OF LEFT KNEE: Primary | ICD-10-CM

## 2021-02-15 DIAGNOSIS — G89.29 CHRONIC PAIN OF LEFT KNEE: Primary | ICD-10-CM

## 2021-02-16 ENCOUNTER — OFFICE VISIT (OUTPATIENT)
Dept: ORTHOPEDIC SURGERY | Facility: CLINIC | Age: 71
End: 2021-02-16

## 2021-02-16 VITALS
HEART RATE: 85 BPM | HEIGHT: 68 IN | DIASTOLIC BLOOD PRESSURE: 80 MMHG | BODY MASS INDEX: 31.22 KG/M2 | WEIGHT: 206 LBS | SYSTOLIC BLOOD PRESSURE: 164 MMHG | OXYGEN SATURATION: 98 %

## 2021-02-16 DIAGNOSIS — M25.562 CHRONIC PAIN OF LEFT KNEE: Primary | ICD-10-CM

## 2021-02-16 DIAGNOSIS — M79.605 LEFT LEG PAIN: ICD-10-CM

## 2021-02-16 DIAGNOSIS — M17.12 PRIMARY LOCALIZED OSTEOARTHROSIS OF LEFT LOWER LEG: ICD-10-CM

## 2021-02-16 DIAGNOSIS — G89.29 CHRONIC PAIN OF LEFT KNEE: Primary | ICD-10-CM

## 2021-02-16 PROCEDURE — 20610 DRAIN/INJ JOINT/BURSA W/O US: CPT | Performed by: ORTHOPAEDIC SURGERY

## 2021-02-16 PROCEDURE — 99214 OFFICE O/P EST MOD 30 MIN: CPT | Performed by: ORTHOPAEDIC SURGERY

## 2021-02-16 RX ORDER — BUPIVACAINE HYDROCHLORIDE 5 MG/ML
3 INJECTION, SOLUTION EPIDURAL; INTRACAUDAL
Status: COMPLETED | OUTPATIENT
Start: 2021-02-16 | End: 2021-02-16

## 2021-02-16 RX ORDER — LIDOCAINE HYDROCHLORIDE AND EPINEPHRINE 10; 10 MG/ML; UG/ML
2 INJECTION, SOLUTION INFILTRATION; PERINEURAL
Status: COMPLETED | OUTPATIENT
Start: 2021-02-16 | End: 2021-02-16

## 2021-02-16 RX ORDER — TRIAMCINOLONE ACETONIDE 40 MG/ML
80 INJECTION, SUSPENSION INTRA-ARTICULAR; INTRAMUSCULAR
Status: COMPLETED | OUTPATIENT
Start: 2021-02-16 | End: 2021-02-16

## 2021-02-16 RX ADMIN — LIDOCAINE HYDROCHLORIDE AND EPINEPHRINE 2 ML: 10; 10 INJECTION, SOLUTION INFILTRATION; PERINEURAL at 14:08

## 2021-02-16 RX ADMIN — BUPIVACAINE HYDROCHLORIDE 3 ML: 5 INJECTION, SOLUTION EPIDURAL; INTRACAUDAL at 14:08

## 2021-02-16 RX ADMIN — TRIAMCINOLONE ACETONIDE 80 MG: 40 INJECTION, SUSPENSION INTRA-ARTICULAR; INTRAMUSCULAR at 14:08

## 2021-02-16 NOTE — PROGRESS NOTES
"Hung Ramsay is a 70 y.o. male returns for     Chief Complaint   Patient presents with   • Left Knee - Pain   • Establish Care       HISTORY OF PRESENT ILLNESS: Patient being seen for left knee follow up. X-rays done today.     Mr. Ramsay continues to have pain in the left lower extremity.  The pain is over the lateral aspect of the knee and proximal leg laterally.  He also complains of pain over the lateral aspect of his hip.  He said he has had 1 or more falls over the last month or so.  Today he said his symptoms have been present for about a month or so.  I have been seeing him for almost 3 months.  His hip pain is worse when he lies on his left side.       CONCURRENT MEDICAL HISTORY:    The following portions of the patient's history were reviewed and updated as appropriate: allergies, current medications, past family history, past medical history, past social history, past surgical history and problem list.         PHYSICAL EXAMINATION:       /80 (BP Location: Left arm, Patient Position: Sitting)   Pulse 85   Ht 172.7 cm (68\")   Wt 93.4 kg (206 lb)   SpO2 98%   BMI 31.32 kg/m²     Physical Exam he is in apparent mild discomfort at rest.    GAIT:     [x]  Normal  [x]  Antalgic    Assistive device: [x]  None  []  Walker     []  Crutches  []  Cane     []  Wheelchair  []  Stretcher    Ortho Exam walks with a short stride slightly antalgic favoring the left.  He has a slight Trendelenburg gait on the left.  He has quite a bit of difficulty in single-leg stance on the left complaining of pain and weakness.  There is prominent tenderness over the greater trochanter.  There is a 1+ effusion of the knee but no warmth erythema.    Radiographs of the hip done 2 years ago were reviewed.  There is a mild cam deformity but no significant joint space narrowing.  There is also calcification about the greater trochanter suggestive of abductor tendinitis.      Xr Knee Bilateral Ap Standing    Result Date: " 2/16/2021  Narrative: Ordering Provider:  Fadi Mercado MD Ordering Diagnosis/Indication:  Chronic pain of left knee Procedure:  XR KNEE BILATERAL AP STANDING Exam Date:  2/16/21 COMPARISON: Exam of the knee dated August 2020     Impression:  Standing PA radiograph of both knees done today show moderate tricompartmental degenerative changes on the left.  There is mild narrowing of the medial joint space.  There is also mild degenerative change primarily in the medial compartment on the right. Fadi Mercado MD 2/16/21            ASSESSMENT: 1 osteoarthritis left knee.  2 left trochanteric bursitis.    He understands there may be more than 1 condition symptomatic.    Treatment options were reviewed.  He was instructed in stretching exercises for the iliotibial band.  He was also instructed in over-the-counter topical anti-inflammatories and analgesics for his knee.    Potential benefits of injections hip therapy for his hip were discussed.  He wished to proceed with this.    The left hip was prepped.  Skin was infiltrated with 1% Xylocaine with epinephrine.  The trochanteric bursa was injected with a mixture of Marcaine Kenalog and Xylocaine with epinephrine.  There were no complications.  After the injection he reported some improvement in pain with weightbearing.    Return here in 1 month if his symptoms have not improved.    Diagnoses and all orders for this visit:    Chronic pain of left knee    Left leg pain  -     Large Joint Arthrocentesis: L greater trochanteric bursa    Primary localized osteoarthrosis of left lower leg  -     Large Joint Arthrocentesis: L greater trochanteric bursa          PLAN  Large Joint Arthrocentesis: L greater trochanteric bursa  Date/Time: 2/16/2021 2:08 PM  Consent given by: patient  Timeout: Immediately prior to procedure a time out was called to verify the correct patient, procedure, equipment, support staff and site/side marked as required   Supporting  Documentation  Indications: pain   Procedure Details  Location: hip - L greater trochanteric bursa  Needle size: 20 G  Medications administered: 2 mL lidocaine-EPINEPHrine 1 %-1:781411; 3 mL bupivacaine (PF) 0.5 %; 80 mg triamcinolone acetonide 40 MG/ML           Patient's Body mass index is 31.32 kg/m². BMI is above normal parameters. Recommendations include: exercise counseling, nutrition counseling and referral to primary care.      No follow-ups on file.    Fadi Mercado MD

## 2021-02-17 ENCOUNTER — IMMUNIZATION (OUTPATIENT)
Dept: VACCINE CLINIC | Facility: HOSPITAL | Age: 71
End: 2021-02-17

## 2021-02-17 PROCEDURE — 91300 HC SARSCOV02 VAC 30MCG/0.3ML IM: CPT | Performed by: THORACIC SURGERY (CARDIOTHORACIC VASCULAR SURGERY)

## 2021-02-17 PROCEDURE — 0002A: CPT | Performed by: THORACIC SURGERY (CARDIOTHORACIC VASCULAR SURGERY)

## 2021-03-16 ENCOUNTER — TRANSCRIBE ORDERS (OUTPATIENT)
Dept: PHYSICAL THERAPY | Facility: HOSPITAL | Age: 71
End: 2021-03-16

## 2021-03-16 DIAGNOSIS — S83.209A ACUTE TORN MENISCUS: Primary | ICD-10-CM

## 2021-03-18 ENCOUNTER — HOSPITAL ENCOUNTER (OUTPATIENT)
Dept: PHYSICAL THERAPY | Facility: HOSPITAL | Age: 71
Setting detail: THERAPIES SERIES
Discharge: HOME | End: 2021-03-18

## 2021-03-18 DIAGNOSIS — S83.242A ACUTE MEDIAL MENISCUS TEAR OF LEFT KNEE, INITIAL ENCOUNTER: Primary | ICD-10-CM

## 2021-03-18 PROCEDURE — 97161 PT EVAL LOW COMPLEX 20 MIN: CPT | Performed by: PHYSICAL THERAPIST

## 2021-03-18 NOTE — THERAPY EVALUATION
Outpatient Physical Therapy Ortho Initial Evaluation  [facility]     Patient Name: Hung Ramsay  : 1950  MRN: [id number]  Today's Date: 3/18/2021      Visit Date: 2021  Visit   Return to MD: ALONA  Re-cert date: 21  Patient Active Problem List   Diagnosis   • Essential hypertension   • Diabetes (CMS/HCC)   • GERD (gastroesophageal reflux disease)   • Insomnia   • Anxiety   • Mixed hyperlipidemia   • Precordial pain   • Abnormal EKG   • Left elbow pain   • Laceration of left elbow   • Cause of injury, fall   • Family history of GI malignancy   • Left upper quadrant pain        Past Medical History:   Diagnosis Date   • Anxiety    • Diabetes (CMS/HCC)    • Essential hypertension    • GERD (gastroesophageal reflux disease)    • Hyperlipidemia    • Insomnia    • Type 2 diabetes mellitus (CMS/HCC)         Past Surgical History:   Procedure Laterality Date   • COLECTOMY PARTIAL / TOTAL     • COLONOSCOPY N/A 2019    Procedure: COLONOSCOPY;  Surgeon: Cesar Santos MD;  Location: [facility] ENDOSCOPY;  Service: Gastroenterology   • ENDOSCOPY AND COLONOSCOPY  2012       Visit Dx:     ICD-10-CM ICD-9-CM   1. Acute medial meniscus tear of left knee, initial encounter  S83.242A 836.0     Medications (Admitted on 3/18/2021)     Outpatient Medications  aspirin 81 MG EC tablet  carbidopa (LODOSYN) 25 MG tablet  Continuous Blood Gluc  (FreeStyle Arianna 14 Day [place]) device  Continuous Blood Gluc Sensor (FreeStyle Arianna Sensor System)  diclofenac (VOLTAREN) 75 MG EC tablet  dicyclomine (Bentyl) 20 MG tablet  esomeprazole (nexIUM) 40 MG capsule  glipizide (GLUCOTROL) 5 MG tablet  glucose blood test strip  glucose monitor monitoring kit  hydrALAZINE (APRESOLINE) 25 MG tablet  losartan-hydrochlorothiazide (HYZAAR) 100-25 MG per tablet  metFORMIN (GLUCOPHAGE) 1000 MG tablet  PARoxetine CR (PAXIL-CR) 37.5 MG 24 hr tablet  pravastatin (PRAVACHOL) 40 MG tablet  promethazine (PHENERGAN) 25  MG tablet  sildenafil (REVATIO) 20 MG tablet  Testosterone Cypionate (DEPOTESTOTERONE CYPIONATE) 200 MG/ML injection  traZODone (DESYREL) 50 MG tablet  Vascepa 1 g capsule capsule     Clinic-Administered Medications  Testosterone Cypionate (DEPOTESTOTERONE CYPIONATE) injection 400 mg    Allergies: NKA    PT Ortho     Row Name 03/18/21 1000       Subjective Comments    Subjective Comments  71 yo male with acute L knee pain starting 4 months ago. Diagnosed with a torn meniscus by Dr. Servin in [place]. His medial L knee region clicks at times with bending his L knee to endrange. Aggravating factors: standing and walking, sleeping, climbing stairs. Easing factors: ice/hot packs-minimal benefit  -BS       Precautions and Contraindications    Precautions/Limitations  no known precautions/limitations  -BS       Subjective Pain    Able to rate subjective pain?  yes  -BS    Pre-Treatment Pain Level  3  -BS    Post-Treatment Pain Level  2  -BS       Posture/Observations    Posture/Observations Comments  TTP along L posterolateral knee region, as well as tender along L biceps femoris tendon; genus varus posture w/ pt's foot collapsed in severe supination, reduced L foot/ankle pain w/ manual therapy to reposition L foot to subtalar neutral position.   -BS       Special Tests/Palpation    Special Tests/Palpation  Knee  -BS       Knee Special Tests    Anterior drawer (ACL lesion)  Left:;Negative  -BS    Valgus stress (MCL lesion)  Left:;Negative  -BS    Varus stress (LCL lesion)  Left:;Negative  -BS    Carlyn’s test (meniscal lesion)  Left:;Positive  -BS       General ROM    GENERAL ROM COMMENTS  AROM: L knee 0-135° R knee 0-140°   -BS       MMT (Manual Muscle Testing)    General MMT Comments  MMT: BLE's 5/5 except R hip abd 4/5 hip ext 4+/5  -BS       Sensation    Light Touch  No apparent deficits  -BS    Additional Comments  intact fine motor coordination-heel to toe taps  -BS       Flexibility    Flexibility Tested?   Lower Extremity  -BS       Lower Extremity Flexibility    Hamstrings  Left:;Severely limited;Right:;Moderately limited L 45°, R 52° w/ 90/90 SLR test position  -BS    Hip Flexors  Bilateral:;WNL  -BS    Quadriceps  Left:;Moderately limited;Right:;WNL  -BS    ITB  Left:;Moderately limited;Right:;WNL  -BS       Balance Skills Training    SLS  3 sec on R, 1 sec on L  -BS      User Key  (r) = Recorded By, (t) = Taken By, (c) = Cosigned By    Initials Name Provider Type    Emery Shanks, PT Physical Therapist                            PT OP Goals     Row Name 03/18/21 1000          PT Short Term Goals    STG Date to Achieve  04/08/21  -BS     STG 1  Improve L hip abduction/extension MMT to 5/5  -BS     STG 1 Progress  New  -BS     STG 2  Able to walk with 75% reduction in L knee pain  -BS     STG 2 Progress  New  -BS     STG 3  Improve L quadriceps and L ITB flexibility to WFL  -BS     STG 3 Progress  New  -BS        Long Term Goals    LTG Date to Achieve  04/15/21  -BS     LTG 1  LTG's TBD  -BS        Time Calculation    PT Goal Re-Cert Due Date  04/08/21  -BS       User Key  (r) = Recorded By, (t) = Taken By, (c) = Cosigned By    Initials Name Provider Type    Emery Shanks, PT Physical Therapist          PT Assessment/Plan     Row Name 03/18/21 1019          PT Assessment    Functional Limitations  Impaired gait;Performance in sport activities;Performance in leisure activities  -BS     Impairments  Balance;Gait;Impaired flexibility;Muscle strength;Pain  -BS     Assessment Comments  Acute L knee pain due to suspected meniscus tear. Tender to palpation along posterolateral L knee region, positive Carlyn finding and pain with standing and weight bearing on the L LE. Issued pt bilat orthotic semi-custom 3/4 length orthotics with bilateral posterolateral support   -BS     Please refer to paper survey for additional self-reported information  Yes  -BS     Rehab Potential  Good  -BS     Patient/caregiver  participated in establishment of treatment plan and goals  Yes  -BS     Patient would benefit from skilled therapy intervention  Yes  -BS        PT Plan    PT Frequency  2x/week  -BS     Predicted Duration of Therapy Intervention (PT)  3 weeks then TBD  -BS     Planned CPT's?  PT EVAL LOW COMPLEXITY: 95615;PT RE-EVAL: 33359;PT THER PROC EA 15 MIN: 17765;PT THER ACT EA 15 MIN: 47494;PT MANUAL THERAPY EA 15 MIN: 01651;PT NEUROMUSC RE-EDUCATION EA 15 MIN: 64733;PT HOT OR COLD PACK TREAT MCARE;PT ELECTRICAL STIM UNATTEND: ;PT ULTRASOUND EA 15 MIN: 52435;PT THER SUPP EA 15 MIN;PT ORTHOTIC MGMT/TRAIN EA 15 MIN: 24800  -BS     Physical Therapy Interventions (Optional Details)  balance training;gait training;home exercise program;joint mobilization;manual therapy techniques;modalities;neuromuscular re-education;patient/family education;ROM (Range of Motion);strengthening;stretching;transfer training  -BS     PT Plan Comments  Address quad control, hip abductor strenghthening. Initiate closed chain (low level) at first-leg press, step ups, TKE w/ TB. Re-check tolerance to orthotics issued.  Will consider custom orthotics if current ones issued are not effective at reducing his L foot/ankle pain and faulty mechanics.   -BS       User Key  (r) = Recorded By, (t) = Taken By, (c) = Cosigned By    Initials Name Provider Type    Emery Shanks, PT Physical Therapist            OP Exercises     Row Name 03/18/21 1000             Subjective Comments    Subjective Comments  71 yo male with acute L knee pain starting 4 months ago. Diagnosed with a torn meniscus by Dr. Servin in [place]. His medial L knee region clicks at times with bending his L knee to endrange. Aggravating factors: standing and walking, sleeping, climbing stairs. Easing factors: ice/hot packs-minimal benefit  -BS         Subjective Pain    Able to rate subjective pain?  yes  -BS      Pre-Treatment Pain Level  3  -BS      Post-Treatment Pain Level  2  -BS         User Key  (r) = Recorded By, (t) = Taken By, (c) = Cosigned By    Initials Name Provider Type    Emery Shanks, PT Physical Therapist                        Outcome Measure Options: Lower Extremity Functional Scale (LEFS)  Lower Extremity Functional Index  Any of your usual work, housework or school activities: Moderate difficulty  Your usual hobbies, recreational or sporting activities: Extreme difficulty or unable to perform activity  Getting into or out of the bath: Quite a bit of difficulty  Walking between rooms: Moderate difficulty  Putting on your shoes or socks: Moderate difficulty  Squatting: Quite a bit of difficulty  Lifting an object, like a bag of groceries from the floor: Moderate difficulty  Performing light activities around your home: Moderate difficulty  Performing heavy activities around your home: No difficulty  Getting into or out of a car: Moderate difficulty  Walking 2 blocks: Quite a bit of difficulty  Walking a mile: Extreme difficulty or unable to perform activity  Going up or down 10 stairs (about 1 flight of stairs): Moderate difficulty  Standing for 1 hour: Extreme difficulty or unable to perform activity  Sitting for 1 hour: No difficulty  Running on even ground: Extreme difficulty or unable to perform activity  Running on uneven ground: Extreme difficulty or unable to perform activity  Making sharp turns while running fast: Extreme difficulty or unable to perform activity  Hopping: Extreme difficulty or unable to perform activity  Rolling over in bed: Moderate difficulty  Total: 27      Time Calculation:     Start Time: 1019  Stop Time: 1100  Time Calculation (min): 41 min  Total Timed Code Minutes- PT: 41 minute(s)     Therapy Charges for Today     Code Description Service Date Service Provider Modifiers Qty    [id number]  PT EVAL LOW COMPLEXITY 3 3/18/2021 Emery Jenkins, PT GP 1          PT G-Codes  Outcome Measure Options: Lower Extremity Functional Scale (LEFS)  Total:  27         Emery Jenkins, PT  3/18/2021

## 2021-04-01 ENCOUNTER — HOSPITAL ENCOUNTER (OUTPATIENT)
Dept: PHYSICAL THERAPY | Facility: HOSPITAL | Age: 71
Setting detail: THERAPIES SERIES
Discharge: HOME | End: 2021-04-01

## 2021-04-01 DIAGNOSIS — S83.242A ACUTE MEDIAL MENISCUS TEAR OF LEFT KNEE, INITIAL ENCOUNTER: Primary | ICD-10-CM

## 2021-04-01 PROCEDURE — 97140 MANUAL THERAPY 1/> REGIONS: CPT | Performed by: PHYSICAL THERAPIST

## 2021-04-01 NOTE — THERAPY TREATMENT NOTE
Outpatient Physical Therapy Ortho Treatment Note  [facility]     Patient Name: Hung Ramsay  : 1950  MRN: [id number]  Today's Date: 2021      Visit Date: 2021    Visit Dx:    ICD-10-CM ICD-9-CM   1. Acute medial meniscus tear of left knee, initial encounter  S83.242A 836.0       Patient Active Problem List   Diagnosis   • Essential hypertension   • Diabetes (CMS/HCC)   • GERD (gastroesophageal reflux disease)   • Insomnia   • Anxiety   • Mixed hyperlipidemia   • Precordial pain   • Abnormal EKG   • Left elbow pain   • Laceration of left elbow   • Cause of injury, fall   • Family history of GI malignancy   • Left upper quadrant pain        Past Medical History:   Diagnosis Date   • Anxiety    • Diabetes (CMS/HCC)    • Essential hypertension    • GERD (gastroesophageal reflux disease)    • Hyperlipidemia    • Insomnia    • Type 2 diabetes mellitus (CMS/HCC)         Past Surgical History:   Procedure Laterality Date   • COLECTOMY PARTIAL / TOTAL     • COLONOSCOPY N/A 2019    Procedure: COLONOSCOPY;  Surgeon: Cesar Santos MD;  Location: [facility] ENDOSCOPY;  Service: Gastroenterology   • ENDOSCOPY AND COLONOSCOPY  2012       PT Ortho     Row Name 21 7945       Subjective Comments    Subjective Comments  Reports doing alot of walking at hospital due to son had surgery. No pain at rest   -BB       Precautions and Contraindications    Precautions/Limitations  no known precautions/limitations  -BB       Subjective Pain    Able to rate subjective pain?  yes  -BB    Pre-Treatment Pain Level  7  -BB    Post-Treatment Pain Level  7  -BB       Posture/Observations    Posture/Observations Comments  significant lateral knee tendernes  -BB      User Key  (r) = Recorded By, (t) = Taken By, (c) = Cosigned By    Initials Name Provider Type    Lora Marks, PT DPT Physical Therapist                      PT Assessment/Plan     Row Name 21 4075          PT Assessment     Assessment Comments  Significant pain levels today that were not able to be helped with taping, stabilization or MFR to lateral knee muscles. Did mold custom orthotics to assist in improved biomechanical alignment. Limited exercises due to pain.   -BB        PT Plan    PT Frequency  2x/week  -BB     Predicted Duration of Therapy Intervention (PT)  3 weeks then TBD  -BB     PT Plan Comments  check for completion of orthotics. Progress ROM and strength as tolerated. Possible Ktape  -BB       User Key  (r) = Recorded By, (t) = Taken By, (c) = Cosigned By    Initials Name Provider Type    Lora Marks, PT DPT Physical Therapist            OP Exercises     Row Name 04/01/21 134             Subjective Comments    Subjective Comments  Reports doing alot of walking at hospital due to son had surgery. No pain at rest   -BB         Subjective Pain    Able to rate subjective pain?  yes  -BB      Pre-Treatment Pain Level  7  -BB      Post-Treatment Pain Level  7  -BB         Exercise 1    Exercise Name 1  Custom orthotics and education   -BB      Time 1  15'   -BB         Exercise 2    Exercise Name 2  MFR to lateral calf and knee with free up   -BB      Time 2  15'   -BB         Exercise 3    Exercise Name 3  attempted K-tape but would not stick  -BB        User Key  (r) = Recorded By, (t) = Taken By, (c) = Cosigned By    Initials Name Provider Type    Lora Marks PT DPT Physical Therapist                       PT OP Goals     Row Name 04/01/21 4952          PT Short Term Goals    STG Date to Achieve  04/08/21  -BB     STG 1  Improve L hip abduction/extension MMT to 5/5  -BB     STG 1 Progress  Not Met  -BB     STG 2  Able to walk with 75% reduction in L knee pain  -BB     STG 2 Progress  Not Met  -BB     STG 3  Improve L quadriceps and L ITB flexibility to WFL  -BB     STG 3 Progress  Not Met  -BB        Long Term Goals    LTG Date to Achieve  04/15/21  -BB     LTG 1  LTG's TBD  -BB        Time Calculation    PT  Goal Re-Cert Due Date  04/08/21  -SEAMUS       User Key  (r) = Recorded By, (t) = Taken By, (c) = Cosigned By    Initials Name Provider Type    Lora Marks PT DPT Physical Therapist                         Time Calculation:   Start Time: 1345  Stop Time: 1430  Time Calculation (min): 45 min  Therapy Charges for Today     Code Description Service Date Service Provider Modifiers Qty    [id number]  PT-CUSTOM ORTHOTICS-LEVEL 1 4/1/2021 Lora Manuel PT DPT  1    [id number]  PT MANUAL THERAPY EA 15 MIN 4/1/2021 Lora Manuel PT DPT GP 1                    Lora Manuel PT DPT  4/1/2021

## 2021-04-05 ENCOUNTER — HOSPITAL ENCOUNTER (OUTPATIENT)
Dept: PHYSICAL THERAPY | Facility: HOSPITAL | Age: 71
Setting detail: THERAPIES SERIES
Discharge: HOME | End: 2021-04-05

## 2021-04-05 DIAGNOSIS — S83.242A ACUTE MEDIAL MENISCUS TEAR OF LEFT KNEE, INITIAL ENCOUNTER: Primary | ICD-10-CM

## 2021-04-05 PROCEDURE — 97110 THERAPEUTIC EXERCISES: CPT | Performed by: PHYSICAL THERAPIST

## 2021-04-05 NOTE — THERAPY TREATMENT NOTE
Outpatient Physical Therapy Ortho Treatment Note  [facility]     Patient Name: Hung Ramsay  : 1950  MRN: [id number]  Today's Date: 2021      Visit Date: 2021  Visit 3/3  Return to MD: ALONA  Re-cert date: 21    Visit Dx:    ICD-10-CM ICD-9-CM   1. Acute medial meniscus tear of left knee, initial encounter  S83.242A 836.0       Patient Active Problem List   Diagnosis   • Essential hypertension   • Diabetes (CMS/HCC)   • GERD (gastroesophageal reflux disease)   • Insomnia   • Anxiety   • Mixed hyperlipidemia   • Precordial pain   • Abnormal EKG   • Left elbow pain   • Laceration of left elbow   • Cause of injury, fall   • Family history of GI malignancy   • Left upper quadrant pain        Past Medical History:   Diagnosis Date   • Anxiety    • Diabetes (CMS/HCC)    • Essential hypertension    • GERD (gastroesophageal reflux disease)    • Hyperlipidemia    • Insomnia    • Type 2 diabetes mellitus (CMS/HCC)         Past Surgical History:   Procedure Laterality Date   • COLECTOMY PARTIAL / TOTAL     • COLONOSCOPY N/A 2019    Procedure: COLONOSCOPY;  Surgeon: Cesar Santos MD;  Location: [facility] ENDOSCOPY;  Service: Gastroenterology   • ENDOSCOPY AND COLONOSCOPY  2012       PT Ortho     Row Name 21 1536       Subjective Comments    Subjective Comments  Fitted for custom orthotics last week. Going to [place], [place] in 2 weeks for a golf tournament.   -BS       Precautions and Contraindications    Precautions/Limitations  no known precautions/limitations  -BS       Subjective Pain    Able to rate subjective pain?  yes  -BS    Pre-Treatment Pain Level  5  -BS    Post-Treatment Pain Level  5  -BS      User Key  (r) = Recorded By, (t) = Taken By, (c) = Cosigned By    Initials Name Provider Type    Emery Shanks, PT Physical Therapist                      PT Assessment/Plan     Row Name 21 0230          PT Assessment    Assessment Comments  Good tolerance to LE  "stretches with increased L knee pain with lateral step ups.  -BS        PT Plan    PT Frequency  2x/week  -BS     Predicted Duration of Therapy Intervention (PT)  3 weeks then TBD  -BS     PT Plan Comments  advance CKC ex's with step downs, SLS on Airex.   -BS       User Key  (r) = Recorded By, (t) = Taken By, (c) = Cosigned By    Initials Name Provider Type    Emery Shanks, PT Physical Therapist          Modalities     Row Name 04/05/21 1345             Ice    Ice Applied  Yes  -BS      Location  L knee  -BS      Rx Minutes  10 mins  -BS      Ice S/P Rx  Yes  -BS        User Key  (r) = Recorded By, (t) = Taken By, (c) = Cosigned By    Initials Name Provider Type    Emery Shanks, PT Physical Therapist        OP Exercises     Row Name 04/05/21 1345             Subjective Comments    Subjective Comments  Fitted for custom orthotics last week. Going to [place], [place] in 2 weeks for a golf tournament.   -BS         Subjective Pain    Able to rate subjective pain?  yes  -BS      Pre-Treatment Pain Level  5  -BS      Post-Treatment Pain Level  5  -BS         Exercise 1    Exercise Name 1  Pro II, level 2  -BS      Time 1  10'  -BS         Exercise 2    Exercise Name 2  standing HS S  -BS      Sets 2  1  -BS      Reps 2  2  -BS      Time 2  30\" hold  -BS      Additional Comments  bilat  -BS         Exercise 3    Exercise Name 3  standing ITB S  -BS      Sets 3  1  -BS      Reps 3  1  -BS      Time 3  30\" hold  -BS      Additional Comments  d/c'd due to increased knee pain  -BS         Exercise 4    Exercise Name 4  SL quad S w/ towel  -BS      Sets 4  1  -BS      Reps 4  2  -BS      Time 4  30\" hold  -BS         Exercise 5    Exercise Name 5  supine ITB S  -BS      Sets 5  1  -BS      Reps 5  2  -BS      Time 5  30\" hold  -BS      Additional Comments  bilat  -BS         Exercise 6    Exercise Name 6  Cybex leg press:2L  -BS      Sets 6  2  -BS      Reps 6  20  -BS      Additional Comments  100#  -BS         " Exercise 7    Exercise Name 7  resisted TKE w/ blue TB  -BS      Sets 7  1  -BS      Reps 7  20  -BS         Exercise 8    Exercise Name 8  standing HR/TR  -BS      Reps 8  20  -BS         Exercise 9    Exercise Name 9  see modalities  -BS        User Key  (r) = Recorded By, (t) = Taken By, (c) = Cosigned By    Initials Name Provider Type    Emery Shanks, PT Physical Therapist                       PT OP Goals     Row Name 04/05/21 1345 04/05/21 1300       PT Short Term Goals    STG Date to Achieve  04/08/21  -BS  --    STG 1  Improve L hip abduction/extension MMT to 5/5  -BS  --    STG 1 Progress  Not Met  -BS  --    STG 2  Able to walk with 75% reduction in L knee pain  -BS  --    STG 2 Progress  Not Met  -BS  --    STG 3  Improve L quadriceps and L ITB flexibility to WFL  -BS  --    STG 3 Progress  Not Met  -BS  --       Long Term Goals    LTG Date to Achieve  04/15/21  -BS  --    LTG 1  LTG's TBD  -BS  --       Time Calculation    PT Goal Re-Cert Due Date  04/08/21  -BS  --  -BS      User Key  (r) = Recorded By, (t) = Taken By, (c) = Cosigned By    Initials Name Provider Type    Emery Shanks, PT Physical Therapist                         Time Calculation:   Start Time: 1345  Stop Time: 1439  Time Calculation (min): 54 min  PT Non-Billable Time (min): 10 min  Total Timed Code Minutes- PT: 44 minute(s)  Therapy Charges for Today     Code Description Service Date Service Provider Modifiers Qty    25570918444 HC PT THER PROC EA 15 MIN 4/5/2021 Emery Jenkins, PT GP 3    02509484815 HC PT THER SUPP EA 15 MIN 4/5/2021 Emery Jenkins, PT GP 1                    Emery Jenkins PT  4/5/2021

## 2021-04-07 ENCOUNTER — HOSPITAL ENCOUNTER (OUTPATIENT)
Dept: PHYSICAL THERAPY | Facility: HOSPITAL | Age: 71
Setting detail: THERAPIES SERIES
Discharge: HOME | End: 2021-04-07

## 2021-04-07 DIAGNOSIS — R25.1 TREMOR: ICD-10-CM

## 2021-04-07 DIAGNOSIS — S83.242A ACUTE MEDIAL MENISCUS TEAR OF LEFT KNEE, INITIAL ENCOUNTER: Primary | ICD-10-CM

## 2021-04-07 PROCEDURE — 97110 THERAPEUTIC EXERCISES: CPT | Performed by: PHYSICAL THERAPIST

## 2021-04-07 RX ORDER — CARBIDOPA 25 MG/1
TABLET ORAL
Qty: 270 TABLET | Refills: 1 | Status: SHIPPED | OUTPATIENT
Start: 2021-04-07 | End: 2021-10-14

## 2021-04-07 NOTE — THERAPY TREATMENT NOTE
Outpatient Physical Therapy Ortho Treatment Note  [facility]     Patient Name: Hung Ramsay  : 1950  MRN: [id number]  Today's Date: 2021      Visit Date: 2021  Visit   Return to MD: ALONA  Re-cert date: 21  Visit Dx:    ICD-10-CM ICD-9-CM   1. Acute medial meniscus tear of left knee, initial encounter  S83.242A 836.0       Patient Active Problem List   Diagnosis   • Essential hypertension   • Diabetes (CMS/HCC)   • GERD (gastroesophageal reflux disease)   • Insomnia   • Anxiety   • Mixed hyperlipidemia   • Precordial pain   • Abnormal EKG   • Left elbow pain   • Laceration of left elbow   • Cause of injury, fall   • Family history of GI malignancy   • Left upper quadrant pain        Past Medical History:   Diagnosis Date   • Anxiety    • Diabetes (CMS/HCC)    • Essential hypertension    • GERD (gastroesophageal reflux disease)    • Hyperlipidemia    • Insomnia    • Type 2 diabetes mellitus (CMS/HCC)         Past Surgical History:   Procedure Laterality Date   • COLECTOMY PARTIAL / TOTAL     • COLONOSCOPY N/A 2019    Procedure: COLONOSCOPY;  Surgeon: Cesar Santos MD;  Location: [facility] ENDOSCOPY;  Service: Gastroenterology   • ENDOSCOPY AND COLONOSCOPY  2012       PT Ortho     Row Name 21 1100       Subjective Comments    Subjective Comments  Reports knee is feeling alot better and thinking exercises are helping   -BB       Precautions and Contraindications    Precautions/Limitations  no known precautions/limitations  -BB       Subjective Pain    Able to rate subjective pain?  yes  -BB    Pre-Treatment Pain Level  4  -BB    Post-Treatment Pain Level  3  -BB       Posture/Observations    Posture/Observations Comments  mild antalgics favoring LLE.   -BB      User Key  (r) = Recorded By, (t) = Taken By, (c) = Cosigned By    Initials Name Provider Type    Lora Marks PT DPT Physical Therapist                      PT Assessment/Plan     Row Name 21  "1100          PT Assessment    Assessment Comments  Tolerated all exercises well without significant complaints. Hardest exercise was step ups noted today. Distributed custom orthotics with skin inspection and wear schedule review.   -BB        PT Plan    PT Frequency  2x/week  -BB     Predicted Duration of Therapy Intervention (PT)  3 weks then TBD  -BB     PT Plan Comments  monitor fit of orthotics. Progress CKC as able  -BB       User Key  (r) = Recorded By, (t) = Taken By, (c) = Cosigned By    Initials Name Provider Type    Lora Marks, PT DPT Physical Therapist            OP Exercises     Row Name 04/07/21 1100             Subjective Comments    Subjective Comments  Reports knee is feeling alot better and thinking exercises are helping   -BB         Subjective Pain    Able to rate subjective pain?  yes  -BB      Pre-Treatment Pain Level  4  -BB      Post-Treatment Pain Level  3  -BB         Exercise 1    Exercise Name 1  Pro II, level 2  -BB      Time 1  10'  -BB         Exercise 2    Exercise Name 2  standing HS S  -BB      Sets 2  3  -BB      Time 2  30\" each   -BB         Exercise 3    Exercise Name 3  mama bear incline stretch   -BB      Sets 3  3  -BB      Time 3  30\" each   -BB         Exercise 4    Exercise Name 4  SLS on foam with slight knee flexion   -BB      Sets 4  5  -BB      Time 4  10\" each bilateral   -BB         Exercise 5    Exercise Name 5  heel to toe on foam beam   -BB      Time 5  5 laps   -BB         Exercise 6    Exercise Name 6  standing TKE red tband   -BB      Sets 6  1  -BB      Reps 6  30  -BB         Exercise 7    Exercise Name 7  step up small step   -BB      Sets 7  2  -BB      Reps 7  10  -BB         Exercise 8    Exercise Name 8  standing HR/TR  -BB      Reps 8  20  -BB         Exercise 9    Exercise Name 9  cast mold orthotic checkout with wear schedule and skin inspection ed  -BB        User Key  (r) = Recorded By, (t) = Taken By, (c) = Cosigned By    Initials Name " Provider Type    Lora Marks PT DPT Physical Therapist                       PT OP Goals     Row Name 04/07/21 1100          PT Short Term Goals    STG Date to Achieve  04/08/21  -BB     STG 1  Improve L hip abduction/extension MMT to 5/5  -BB     STG 1 Progress  Not Met  -BB     STG 2  Able to walk with 75% reduction in L knee pain  -BB     STG 2 Progress  Not Met  -BB     STG 3  Improve L quadriceps and L ITB flexibility to WFL  -BB     STG 3 Progress  Not Met  -BB        Long Term Goals    LTG Date to Achieve  04/15/21  -BB     LTG 1  LTG's TBD  -BB        Time Calculation    PT Goal Re-Cert Due Date  04/08/21  -BB       User Key  (r) = Recorded By, (t) = Taken By, (c) = Cosigned By    Initials Name Provider Type    Lora Marks, PT DPT Physical Therapist                         Time Calculation:   Start Time: 1100  Stop Time: 1155  Time Calculation (min): 55 min  Therapy Charges for Today     Code Description Service Date Service Provider Modifiers Qty    32368848071 HC PT THER PROC EA 15 MIN 4/7/2021 Lora Manuel PT DPT GP 3    37910043481 HC PT THER SUPP EA 15 MIN 4/7/2021 Lora Manuel PT DPT GP 1                    Lora Manuel PT DPT  4/7/2021

## 2021-04-12 ENCOUNTER — HOSPITAL ENCOUNTER (OUTPATIENT)
Dept: PHYSICAL THERAPY | Facility: HOSPITAL | Age: 71
Setting detail: THERAPIES SERIES
Discharge: HOME | End: 2021-04-12

## 2021-04-12 DIAGNOSIS — S83.242A ACUTE MEDIAL MENISCUS TEAR OF LEFT KNEE, INITIAL ENCOUNTER: Primary | ICD-10-CM

## 2021-04-12 PROCEDURE — 97110 THERAPEUTIC EXERCISES: CPT | Performed by: PHYSICAL THERAPIST

## 2021-04-12 NOTE — THERAPY PROGRESS REPORT/RE-CERT
Outpatient Physical Therapy Ortho Progress Note  [facility]     Patient Name: Hung Ramsay  : 1950  MRN: [id number]  Today's Date: 2021      Visit Date: 2021  Visit   Return to MD: ALONA  Re-cert date: 5/3/21    Visit Dx:    ICD-10-CM ICD-9-CM   1. Acute medial meniscus tear of left knee, initial encounter  S83.242A 836.0       Patient Active Problem List   Diagnosis   • Essential hypertension   • Diabetes (CMS/HCC)   • GERD (gastroesophageal reflux disease)   • Insomnia   • Anxiety   • Mixed hyperlipidemia   • Precordial pain   • Abnormal EKG   • Left elbow pain   • Laceration of left elbow   • Cause of injury, fall   • Family history of GI malignancy   • Left upper quadrant pain        Past Medical History:   Diagnosis Date   • Anxiety    • Diabetes (CMS/HCC)    • Essential hypertension    • GERD (gastroesophageal reflux disease)    • Hyperlipidemia    • Insomnia    • Type 2 diabetes mellitus (CMS/HCC)         Past Surgical History:   Procedure Laterality Date   • COLECTOMY PARTIAL / TOTAL     • COLONOSCOPY N/A 2019    Procedure: COLONOSCOPY;  Surgeon: Cesar Santos MD;  Location: [facility] ENDOSCOPY;  Service: Gastroenterology   • ENDOSCOPY AND COLONOSCOPY  2012       PT Ortho     Row Name 21 1200       Subjective Comments    Subjective Comments     -BS    Row Name 21 1106       Subjective Comments    Subjective Comments  Pt reports L calf and L hamstrings region pain this morning, not much L knee pain at this moment. Custom orthotics working well. Wears his custom orthotics following wear schedule and is currently at 3 hours at a time now.    -BS       Precautions and Contraindications    Precautions/Limitations  no known precautions/limitations  -BS       Subjective Pain    Able to rate subjective pain?  yes  -BS    Pre-Treatment Pain Level  5  -BS    Post-Treatment Pain Level  4  -BS       General ROM    GENERAL ROM COMMENTS  0-138° L knee AROM  -BS        MMT (Manual Muscle Testing)    General MMT Comments  MMT:  L hip-abd 4/5 ext 5/5   -BS       Lower Extremity Flexibility    LE Flexibility Comments  positive Bucky test for left ITB/hip flexor tightness (moderate)  -BS      User Key  (r) = Recorded By, (t) = Taken By, (c) = Cosigned By    Initials Name Provider Type    Emery Shanks, PT Physical Therapist                      PT Assessment/Plan     Row Name 04/12/21 1106          PT Assessment    Assessment Comments  pt with good tolerance to tx. Reduced pain post tx. Partially met strength goal set, flexibility goal unmet for L hip flexors/IT band. Would benefit from ongoing skilled PT to maximize glut medius strength and ITB/iliopsoas flexibility.  -BS        PT Plan    PT Frequency  2x/week  -BS     Predicted Duration of Therapy Intervention (PT)  3 weeks  -BS     PT Plan Comments  continue w/ hip flexor/ITB stretching manually as well as progressing hip abductor strength. [employer] machine, Cybex leg press w/ 1 vs 2 legs.   -BS       User Key  (r) = Recorded By, (t) = Taken By, (c) = Cosigned By    Initials Name Provider Type    Emery Shanks, PT Physical Therapist            OP Exercises     Row Name 04/12/21 1200 04/12/21 1106          Subjective Comments    Subjective Comments     -  Pt reports L calf and L hamstrings region pain this morning, not much L knee pain at this moment. Custom orthotics working well. Wears his custom orthotics following wear schedule and is currently at 3 hours at a time now.    -BS        Subjective Pain    Able to rate subjective pain?  --  yes  -BS     Pre-Treatment Pain Level  --  5  -BS     Post-Treatment Pain Level  --  4  -BS        Exercise 1    Exercise Name 1  --  Pro II, level 2  -BS     Time 1  --  10'  -BS        Exercise 2    Exercise Name 2  --  SL L hip abduction  -BS     Sets 2  --  2  -BS     Reps 2  --  15  -BS        Exercise 3    Exercise Name 3  --  mama bear incline stretch   -BS     Sets 3  --  3  " -BS     Time 3  --  30\" each   -BS        Exercise 4    Exercise Name 4  --  standing HS S  -BS     Sets 4  --  1  -BS     Reps 4  --  3  -BS     Time 4  --  30\" hold  -BS        Exercise 5    Exercise Name 5  --  HR/TR in standing  -BS     Sets 5  --  1  -BS     Reps 5  --  10  -BS        Exercise 6    Exercise Name 6  --  MS  -BS     Sets 6  --  1  -BS     Reps 6  --  10  -BS        Exercise 7    Exercise Name 7  --  fwd/lat step ups, 6\"  -BS     Sets 7  --  1  -BS     Reps 7  --  10  -BS        Exercise 8    Exercise Name 8  --  sit to stand, no UE A  -BS     Sets 8  --  1  -BS     Reps 8  --  10  -BS        Exercise 9    Exercise Name 9  --  SL quad/hip flexor S  -BS     Sets 9  --  1  -BS     Reps 9  --  3  -BS     Time 9  --  30\" hold  -BS     Additional Comments  --  w/ towel around ankle  -BS        Exercise 10    Exercise Name 10  --  Bucky S  -BS     Sets 10  --  1  -BS     Reps 10  --  3  -BS     Time 10  --  30\" hold  -BS     Additional Comments  --  L only  -BS        Exercise 11    Exercise Name 11  --  deferred ice  -BS        Exercise 12    Exercise Name 12  --  supine ITB S  -BS     Reps 12  --  1  -BS     Time 12  --  3  -BS     Additional Comments  --  30\" hold, L only  -BS       User Key  (r) = Recorded By, (t) = Taken By, (c) = Cosigned By    Initials Name Provider Type    BS Emery Jenkins, PT Physical Therapist                       PT OP Goals     Row Name 04/12/21 1100          PT Short Term Goals    STG Date to Achieve  04/08/21  -BS     STG 1  Improve L hip abduction/extension MMT to 5/5  -BS     STG 1 Progress  Partially Met met for L hip ext, not for L hip abd  -BS     STG 2  Able to walk with 75% reduction in L knee pain  -BS     STG 2 Progress  Met  -BS     STG 3  Improve L quadriceps and L ITB flexibility to WFL  -BS     STG 3 Progress  Ongoing;Progressing  -BS        Long Term Goals    LTG Date to Achieve  04/15/21  -BS     LTG 1  LTG's TBD  -BS        Time Calculation    PT Goal " Re-Cert Due Date  05/03/21  -LEONIDES       User Key  (r) = Recorded By, (t) = Taken By, (c) = Cosigned By    Initials Name Provider Type    Emery Shanks, PT Physical Therapist                         Time Calculation:   Start Time: 1106  Stop Time: 1148  Time Calculation (min): 42 min  Total Timed Code Minutes- PT: 42 minute(s)  Therapy Charges for Today     Code Description Service Date Service Provider Modifiers Qty    14618157546  PT THER PROC EA 15 MIN 4/12/2021 Emery Jenkins, PT GP 3                    Emery Jenkins, PT  4/12/2021

## 2021-04-14 ENCOUNTER — HOSPITAL ENCOUNTER (OUTPATIENT)
Dept: PHYSICAL THERAPY | Facility: HOSPITAL | Age: 71
Setting detail: THERAPIES SERIES
Discharge: HOME | End: 2021-04-14

## 2021-04-14 DIAGNOSIS — F41.9 ANXIETY: ICD-10-CM

## 2021-04-14 DIAGNOSIS — G47.00 INSOMNIA, UNSPECIFIED TYPE: ICD-10-CM

## 2021-04-14 DIAGNOSIS — S83.242A ACUTE MEDIAL MENISCUS TEAR OF LEFT KNEE, INITIAL ENCOUNTER: Primary | ICD-10-CM

## 2021-04-14 PROCEDURE — 97110 THERAPEUTIC EXERCISES: CPT | Performed by: PHYSICAL THERAPIST

## 2021-04-14 RX ORDER — TRAZODONE HYDROCHLORIDE 50 MG/1
TABLET ORAL
Qty: 90 TABLET | Refills: 1 | Status: SHIPPED | OUTPATIENT
Start: 2021-04-14 | End: 2021-10-14

## 2021-04-14 NOTE — THERAPY TREATMENT NOTE
Outpatient Physical Therapy Ortho Treatment Note  [facility]     Patient Name: Hung Ramsay  : 1950  MRN: [id number]  Today's Date: 2021      Visit Date: 2021  Visit   Return to MD: ALONA  Re-cert date: 5/3/21    Visit Dx:    ICD-10-CM ICD-9-CM   1. Acute medial meniscus tear of left knee, initial encounter  S83.242A 836.0       Patient Active Problem List   Diagnosis   • Essential hypertension   • Diabetes (CMS/HCC)   • GERD (gastroesophageal reflux disease)   • Insomnia   • Anxiety   • Mixed hyperlipidemia   • Precordial pain   • Abnormal EKG   • Left elbow pain   • Laceration of left elbow   • Cause of injury, fall   • Family history of GI malignancy   • Left upper quadrant pain        Past Medical History:   Diagnosis Date   • Anxiety    • Diabetes (CMS/HCC)    • Essential hypertension    • GERD (gastroesophageal reflux disease)    • Hyperlipidemia    • Insomnia    • Type 2 diabetes mellitus (CMS/HCC)         Past Surgical History:   Procedure Laterality Date   • COLECTOMY PARTIAL / TOTAL     • COLONOSCOPY N/A 2019    Procedure: COLONOSCOPY;  Surgeon: Cesar Santos MD;  Location: [facility] ENDOSCOPY;  Service: Gastroenterology   • ENDOSCOPY AND COLONOSCOPY  2012       PT Ortho     Row Name 21 1355       Subjective Comments    Subjective Comments  Reduced L knee pain today, still with nagging soreness in the L calf and L hamstrings regions.   -BS       Precautions and Contraindications    Precautions/Limitations  no known precautions/limitations  -BS       Subjective Pain    Able to rate subjective pain?  yes  -BS    Pre-Treatment Pain Level  2  -BS      User Key  (r) = Recorded By, (t) = Taken By, (c) = Cosigned By    Initials Name Provider Type    Emery Shanks, PT Physical Therapist                      PT Assessment/Plan     Row Name 21 1400          PT Assessment    Assessment Comments  Pt with reduced L calf/HS tightness post STM intervention.  "Limited by lack of eccentric control of L quads w/ sit to stands w/ no UE A. Pt on vacation all next week playing golf in [place].   -BS        PT Plan    PT Frequency  2x/week  -BS     Predicted Duration of Therapy Intervention (PT)  3 weeks  -BS     PT Plan Comments  continue per poc. Eccentric control of quads to be addressed. TKE w/ TB, eccentric step downs.   -BS       User Key  (r) = Recorded By, (t) = Taken By, (c) = Cosigned By    Initials Name Provider Type    Emery Shanks, PT Physical Therapist            OP Exercises     Row Name 04/14/21 8308             Subjective Comments    Subjective Comments  Reduced L knee pain today, still with nagging soreness in the L calf and L hamstrings regions.   -BS         Subjective Pain    Able to rate subjective pain?  yes  -BS      Pre-Treatment Pain Level  2  -BS      Post-Treatment Pain Level  2  -BS         Exercise 1    Exercise Name 1  Pro II, level 2  -BS      Time 1  10'  -BS         Exercise 2    Exercise Name 2  fwd lunge S, L hip flex S  -BS      Sets 2  1  -BS      Reps 2  3  -BS      Time 2  30\" hold  -BS         Exercise 3    Exercise Name 3  Cybex leg press: 2L  -BS      Sets 3  2  -BS      Reps 3  20  -BS      Additional Comments  90#  -BS         Exercise 4    Exercise Name 4  Cybex leg press: 1L  -BS      Sets 4  1  -BS      Reps 4  20  -BS      Additional Comments  90#  -BS         Exercise 5    Exercise Name 5  Multihip-R hip flex/abd/ext  -BS      Sets 5  1  -BS      Reps 5  20  -BS      Additional Comments  2 plates  -BS         Exercise 6    Exercise Name 6  STM to L calf/hamstrings  -BS      Time 6  8'  -BS      Additional Comments  in prone  -BS         Exercise 7    Exercise Name 7  manual ITB S in supine position  -BS      Sets 7  1  -BS      Reps 7  3  -BS      Time 7  15\" hold  -BS         Exercise 8    Exercise Name 8  sit to stand, no UE A  -BS      Sets 8  1  -BS      Reps 8  10  -BS         Exercise 9    Exercise Name 9  " standing HR/TR w/ B UE A  -BS      Sets 9  1  -BS      Reps 9  20  -BS         Exercise 10    Exercise Name 10  MS  -BS      Sets 10  1  -BS      Reps 10  20  -BS         Exercise 11    Exercise Name 11  attempted L SLS w/ no UE A  -BS      Time 11  unable due to L knee pain and instability  -BS        User Key  (r) = Recorded By, (t) = Taken By, (c) = Cosigned By    Initials Name Provider Type    Emery Shanks, PT Physical Therapist                      Manual Rx (last 36 hours)      Manual Treatments     Row Name 04/14/21 1300             Manual Rx 1    Manual Rx 1 Location  STM to L gastroc/L hamstrings regions  -BS      Manual Rx 1 Type  prone lying  -BS      Manual Rx 1 Duration  8  -BS        User Key  (r) = Recorded By, (t) = Taken By, (c) = Cosigned By    Initials Name Provider Type    Emery Shanks, PT Physical Therapist          PT OP Goals     Row Name 04/14/21 1400          PT Short Term Goals    STG Date to Achieve  04/08/21  -BS     STG 1  Improve L hip abduction/extension MMT to 5/5  -BS     STG 1 Progress  Partially Met met for L hip ext, not for L hip abd  -BS     STG 2  Able to walk with 75% reduction in L knee pain  -BS     STG 2 Progress  Met  -BS     STG 3  Improve L quadriceps and L ITB flexibility to WFL  -BS     STG 3 Progress  Ongoing;Progressing  -BS        Long Term Goals    LTG Date to Achieve  04/15/21  -BS     LTG 1  LTG's TBD  -BS        Time Calculation    PT Goal Re-Cert Due Date  05/03/21  -BS       User Key  (r) = Recorded By, (t) = Taken By, (c) = Cosigned By    Initials Name Provider Type    Emery Shanks, PT Physical Therapist                         Time Calculation:   Start Time: 1355  Stop Time: 1443  Time Calculation (min): 48 min  Total Timed Code Minutes- PT: 48 minute(s)  Therapy Charges for Today     Code Description Service Date Service Provider Modifiers Qty    74687256398 HC PT THER PROC EA 15 MIN 4/14/2021 Emery Jenkins, PT GP 3                    Emery  ROSA Jenkins, PT  4/14/2021

## 2021-04-19 ENCOUNTER — APPOINTMENT (OUTPATIENT)
Dept: PHYSICAL THERAPY | Facility: HOSPITAL | Age: 71
End: 2021-04-19

## 2021-04-21 ENCOUNTER — APPOINTMENT (OUTPATIENT)
Dept: PHYSICAL THERAPY | Facility: HOSPITAL | Age: 71
End: 2021-04-21

## 2021-04-26 ENCOUNTER — APPOINTMENT (OUTPATIENT)
Dept: PHYSICAL THERAPY | Facility: HOSPITAL | Age: 71
End: 2021-04-26

## 2021-04-28 ENCOUNTER — APPOINTMENT (OUTPATIENT)
Dept: PHYSICAL THERAPY | Facility: HOSPITAL | Age: 71
End: 2021-04-28

## 2021-05-03 ENCOUNTER — APPOINTMENT (OUTPATIENT)
Dept: PHYSICAL THERAPY | Facility: HOSPITAL | Age: 71
End: 2021-05-03

## 2021-05-05 ENCOUNTER — APPOINTMENT (OUTPATIENT)
Dept: PHYSICAL THERAPY | Facility: HOSPITAL | Age: 71
End: 2021-05-05

## 2021-05-10 ENCOUNTER — APPOINTMENT (OUTPATIENT)
Dept: PHYSICAL THERAPY | Facility: HOSPITAL | Age: 71
End: 2021-05-10

## 2021-05-12 ENCOUNTER — APPOINTMENT (OUTPATIENT)
Dept: PHYSICAL THERAPY | Facility: HOSPITAL | Age: 71
End: 2021-05-12

## 2021-05-30 DIAGNOSIS — E78.5 HYPERLIPIDEMIA, UNSPECIFIED HYPERLIPIDEMIA TYPE: ICD-10-CM

## 2021-06-01 RX ORDER — PRAVASTATIN SODIUM 40 MG
TABLET ORAL
Qty: 90 TABLET | Refills: 2 | Status: SHIPPED | OUTPATIENT
Start: 2021-06-01 | End: 2022-02-21

## 2021-07-12 DIAGNOSIS — E11.9 TYPE 2 DIABETES MELLITUS WITHOUT COMPLICATION, WITHOUT LONG-TERM CURRENT USE OF INSULIN (HCC): ICD-10-CM

## 2021-07-12 RX ORDER — ESOMEPRAZOLE MAGNESIUM 40 MG/1
CAPSULE, DELAYED RELEASE ORAL
Qty: 90 CAPSULE | Refills: 1 | Status: SHIPPED | OUTPATIENT
Start: 2021-07-12 | End: 2021-11-29

## 2021-07-31 DIAGNOSIS — I10 ESSENTIAL HYPERTENSION: ICD-10-CM

## 2021-08-02 RX ORDER — LOSARTAN POTASSIUM AND HYDROCHLOROTHIAZIDE 25; 100 MG/1; MG/1
TABLET ORAL
Qty: 90 TABLET | Refills: 0 | Status: SHIPPED | OUTPATIENT
Start: 2021-08-02 | End: 2021-10-27

## 2021-08-15 DIAGNOSIS — E78.1 HYPERTRIGLYCERIDEMIA: ICD-10-CM

## 2021-08-16 RX ORDER — ICOSAPENT ETHYL 1000 MG/1
CAPSULE ORAL
Qty: 120 CAPSULE | Refills: 2 | Status: SHIPPED | OUTPATIENT
Start: 2021-08-16

## 2021-09-22 ENCOUNTER — OFFICE VISIT (OUTPATIENT)
Dept: FAMILY MEDICINE CLINIC | Facility: CLINIC | Age: 71
End: 2021-09-22

## 2021-09-22 ENCOUNTER — LAB (OUTPATIENT)
Dept: LAB | Facility: HOSPITAL | Age: 71
End: 2021-09-22

## 2021-09-22 VITALS
HEIGHT: 68 IN | BODY MASS INDEX: 31.92 KG/M2 | OXYGEN SATURATION: 98 % | DIASTOLIC BLOOD PRESSURE: 76 MMHG | HEART RATE: 71 BPM | WEIGHT: 210.6 LBS | SYSTOLIC BLOOD PRESSURE: 124 MMHG

## 2021-09-22 DIAGNOSIS — F33.0 MAJOR DEPRESSIVE DISORDER, RECURRENT EPISODE, MILD DEGREE (HCC): ICD-10-CM

## 2021-09-22 DIAGNOSIS — E11.9 TYPE 2 DIABETES MELLITUS WITHOUT COMPLICATION, WITHOUT LONG-TERM CURRENT USE OF INSULIN (HCC): ICD-10-CM

## 2021-09-22 DIAGNOSIS — Z13.29 SCREENING FOR HYPOTHYROIDISM: ICD-10-CM

## 2021-09-22 DIAGNOSIS — I10 ESSENTIAL HYPERTENSION: ICD-10-CM

## 2021-09-22 DIAGNOSIS — Z12.5 ENCOUNTER FOR SCREENING FOR MALIGNANT NEOPLASM OF PROSTATE: ICD-10-CM

## 2021-09-22 DIAGNOSIS — E78.5 HYPERLIPIDEMIA, UNSPECIFIED HYPERLIPIDEMIA TYPE: ICD-10-CM

## 2021-09-22 DIAGNOSIS — I10 ESSENTIAL HYPERTENSION: Primary | ICD-10-CM

## 2021-09-22 DIAGNOSIS — N52.9 ERECTILE DYSFUNCTION, UNSPECIFIED ERECTILE DYSFUNCTION TYPE: ICD-10-CM

## 2021-09-22 PROCEDURE — 80053 COMPREHEN METABOLIC PANEL: CPT

## 2021-09-22 PROCEDURE — 82570 ASSAY OF URINE CREATININE: CPT

## 2021-09-22 PROCEDURE — 82043 UR ALBUMIN QUANTITATIVE: CPT

## 2021-09-22 PROCEDURE — 83036 HEMOGLOBIN GLYCOSYLATED A1C: CPT

## 2021-09-22 PROCEDURE — 99214 OFFICE O/P EST MOD 30 MIN: CPT | Performed by: NURSE PRACTITIONER

## 2021-09-22 PROCEDURE — 80061 LIPID PANEL: CPT

## 2021-09-22 PROCEDURE — 84443 ASSAY THYROID STIM HORMONE: CPT

## 2021-09-22 PROCEDURE — 85025 COMPLETE CBC W/AUTO DIFF WBC: CPT

## 2021-09-22 NOTE — PROGRESS NOTES
Chief Complaint  Diabetes (10 month check up), Hyperlipidemia, Hypertension, Depression, and Erectile Dysfunction    Subjective          Hung Ramsay presents to [facility] - [place]  Depression  Visit Type: follow-up  Patient presents with the following symptoms: depressed mood and nervousness/anxiety.  Patient is not experiencing: confusion, feelings of hopelessness, feelings of worthlessness, palpitations, panic and shortness of breath.  Frequency of symptoms: occasionally   Severity: mild   Sleep per night: 8 hours  Sleep quality: good  Nighttime awakenings: occasional  Compliance with medications:  %        Erectile Dysfunction  This is a chronic problem. The current episode started more than 1 year ago. The problem has been gradually worsening since onset. The nature of his difficulty is achieving erection. Non-physiologic factors contributing to erectile dysfunction are anxiety. Obstructive symptoms include an intermittent stream, a slower stream, straining and a weak stream. Pertinent negatives include no chills. The symptoms are aggravated by poor sleep and stress. Past treatments include sildenafil. The treatment provided no relief.   Diabetes  He presents for his follow-up diabetic visit. He has type 2 diabetes mellitus. His disease course has been stable. Hypoglycemia symptoms include nervousness/anxiousness. Pertinent negatives for hypoglycemia include no confusion. Pertinent negatives for diabetes include no chest pain and no fatigue. There are no hypoglycemic complications. Symptoms are stable. There are no diabetic complications. Risk factors for coronary artery disease include male sex, obesity, sedentary lifestyle, family history and diabetes mellitus. Current diabetic treatment includes oral agent (dual therapy). He is compliant with treatment all of the time.   Hypertension  This is a chronic problem. The current episode started more than  "1 year ago. The problem is unchanged. The problem is controlled. Pertinent negatives include no chest pain, orthopnea, palpitations, peripheral edema or shortness of breath. There are no associated agents to hypertension. Risk factors for coronary artery disease include obesity, male gender, family history and dyslipidemia. Current antihypertension treatment includes diuretics and angiotensin blockers.   Hyperlipidemia  This is a chronic problem. The current episode started more than 1 year ago. The problem is controlled. Exacerbating diseases include obesity. Factors aggravating his hyperlipidemia include fatty foods and thiazides. Pertinent negatives include no chest pain or shortness of breath. Current antihyperlipidemic treatment includes statins. The current treatment provides moderate improvement of lipids.       Objective   Vital Signs:   /76   Pulse 71   Ht 172.7 cm (68\")   Wt 95.5 kg (210 lb 9.6 oz)   SpO2 98%   BMI 32.02 kg/m²     Physical Exam  Vitals and nursing note reviewed.   Constitutional:       Appearance: He is well-developed.   HENT:      Head: Normocephalic.      Right Ear: External ear normal.      Left Ear: External ear normal.   Eyes:      Pupils: Pupils are equal, round, and reactive to light.   Cardiovascular:      Rate and Rhythm: Normal rate and regular rhythm.      Heart sounds: Normal heart sounds.   Pulmonary:      Effort: Pulmonary effort is normal.      Breath sounds: Normal breath sounds.   Abdominal:      General: Bowel sounds are normal.      Palpations: Abdomen is soft.   Musculoskeletal:         General: Normal range of motion.      Cervical back: Normal range of motion and neck supple.      Right foot: No bunion.      Left foot: Bunion present.   Feet:      Right foot:      Skin integrity: Dry skin present.      Toenail Condition: Right toenails are abnormally thick, long and ingrown. Fungal disease present.     Left foot:      Skin integrity: Dry skin present.      " Toenail Condition: Left toenails are abnormally thick, long and ingrown. Fungal disease present.     Comments: Diabetic foot exam:   Left: Filament test present   Pulses Dorsalis Pedis:  present   Reflexes 3+    Vibratory sensation normal   Proprioception normal   Sharp/dull discrimination normal       Right: Filament test present   Pulses Dorsalis Pedis:  present   Reflexes 3+    Vibratory sensation normal   Proprioception normal   Sharp/dull discrimination normal      Skin:     General: Skin is warm.      Capillary Refill: Capillary refill takes less than 2 seconds.   Neurological:      Mental Status: He is alert and oriented to person, place, and time.   Psychiatric:         Behavior: Behavior normal.        Result Review :                   Assessment and Plan    Diagnoses and all orders for this visit:    1. Essential hypertension (Primary)  -     CBC & Differential; Future  -     Comprehensive Metabolic Panel; Future    2. Hyperlipidemia, unspecified hyperlipidemia type  -     Lipid Panel; Future    3. Type 2 diabetes mellitus without complication, without long-term current use of insulin (CMS/[facility])  -     Hemoglobin A1c; Future  -     Microalbumin / Creatinine Urine Ratio - Urine, Clean Catch; Future    4. Major depressive disorder, recurrent episode, mild degree ([facility])    5. Erectile dysfunction, unspecified erectile dysfunction type    6. Screening for hypothyroidism  -     TSH; Future    7. Encounter for screening for malignant neoplasm of prostate   -     PSA Screen; Future      1.  Essential hypertension:  Complete CBC chemistry panel as ordered, notify results when available  Continue to adhere to low-sodium diet no more than 2000 mg/day  Continue on Hyzaar as previously prescribed  Avoid adding extra table salt to food    2.  Hyperlipidemia:  Complete fasting lipid panel as ordered and notify of results when available  Continue to adhere to a low-fat diet  Bake or air christianson foods    3.  Type 2 diabetes  mellitus without complication, without long-term current use of insulin:  Complete hemoglobin A1c and microalbumin/creatinine urine ratio as ordered and notify results when available  Continue on glipizide as previously prescribed  Continue on Glucophage as previously prescribed  Continue to adhere to a diet low in concentrated sweets and carbohydrates  Educated on importance of thorough diabetic foot care and encouraged to inspect feet daily for signs of ulceration, callus development or foreign body    4.  Major depressive disorder, recurrent episode, mild degree:  Continue on Paxil as previously prescribed  Continue on trazodone as previously prescribed    5.  Erectile dysfunction, unspecified erectile dysfunction type:  Continue on sildenafil as previously prescribed    6.  Screening for hypothyroidism:  Complete TSH as ordered and will notify of results when available    7.  Encounter for screening for malignant neoplasm of the prostate:  Complete PSA as ordered and notify results    I spent 31 minutes caring for Hung on this date of service. This time includes time spent by me in the following activities:preparing for the visit, reviewing tests, obtaining and/or reviewing a separately obtained history, performing a medically appropriate examination and/or evaluation , counseling and educating the patient/family/caregiver, ordering medications, tests, or procedures and documenting information in the medical record  Follow Up   Return in about 3 months (around 12/22/2021) for Medicare Wellness.  Patient was given instructions and counseling regarding his condition or for health maintenance advice. Please see specific information pulled into the AVS if appropriate.         This document has been electronically signed by GEMINI Red on September 22, 2021 12:25 CDT

## 2021-09-23 ENCOUNTER — TELEPHONE (OUTPATIENT)
Dept: FAMILY MEDICINE CLINIC | Facility: CLINIC | Age: 71
End: 2021-09-23

## 2021-09-23 LAB
ALBUMIN SERPL-MCNC: 4.6 G/DL (ref 3.5–5.2)
ALBUMIN UR-MCNC: <1.2 MG/DL
ALBUMIN/GLOB SERPL: 2 G/DL
ALP SERPL-CCNC: 68 U/L (ref 39–117)
ALT SERPL W P-5'-P-CCNC: 7 U/L (ref 1–41)
ANION GAP SERPL CALCULATED.3IONS-SCNC: 11.6 MMOL/L (ref 5–15)
AST SERPL-CCNC: 13 U/L (ref 1–40)
BASOPHILS # BLD AUTO: 0.02 10*3/MM3 (ref 0–0.2)
BASOPHILS NFR BLD AUTO: 0.2 % (ref 0–1.5)
BILIRUB SERPL-MCNC: 0.3 MG/DL (ref 0–1.2)
BUN SERPL-MCNC: 18 MG/DL (ref 8–23)
BUN/CREAT SERPL: 14.8 (ref 7–25)
CALCIUM SPEC-SCNC: 9.2 MG/DL (ref 8.6–10.5)
CHLORIDE SERPL-SCNC: 104 MMOL/L (ref 98–107)
CHOLEST SERPL-MCNC: 125 MG/DL (ref 0–200)
CO2 SERPL-SCNC: 25.4 MMOL/L (ref 22–29)
CREAT SERPL-MCNC: 1.22 MG/DL (ref 0.76–1.27)
CREAT UR-MCNC: 122.3 MG/DL
DEPRECATED RDW RBC AUTO: 45.8 FL (ref 37–54)
EOSINOPHIL # BLD AUTO: 0.1 10*3/MM3 (ref 0–0.4)
EOSINOPHIL NFR BLD AUTO: 1 % (ref 0.3–6.2)
ERYTHROCYTE [DISTWIDTH] IN BLOOD BY AUTOMATED COUNT: 13.3 % (ref 12.3–15.4)
GFR SERPL CREATININE-BSD FRML MDRD: 59 ML/MIN/1.73
GLOBULIN UR ELPH-MCNC: 2.3 GM/DL
GLUCOSE SERPL-MCNC: 97 MG/DL (ref 65–99)
HBA1C MFR BLD: 5.29 % (ref 4.8–5.6)
HCT VFR BLD AUTO: 40.8 % (ref 37.5–51)
HDLC SERPL-MCNC: 30 MG/DL (ref 40–60)
HGB BLD-MCNC: 13.9 G/DL (ref 13–17.7)
IMM GRANULOCYTES # BLD AUTO: 0.06 10*3/MM3 (ref 0–0.05)
IMM GRANULOCYTES NFR BLD AUTO: 0.6 % (ref 0–0.5)
LDLC SERPL CALC-MCNC: 61 MG/DL (ref 0–100)
LDLC/HDLC SERPL: 1.79 {RATIO}
LYMPHOCYTES # BLD AUTO: 2.76 10*3/MM3 (ref 0.7–3.1)
LYMPHOCYTES NFR BLD AUTO: 26.6 % (ref 19.6–45.3)
MCH RBC QN AUTO: 32 PG (ref 26.6–33)
MCHC RBC AUTO-ENTMCNC: 34.1 G/DL (ref 31.5–35.7)
MCV RBC AUTO: 94 FL (ref 79–97)
MICROALBUMIN/CREAT UR: NORMAL MG/G{CREAT}
MONOCYTES # BLD AUTO: 0.61 10*3/MM3 (ref 0.1–0.9)
MONOCYTES NFR BLD AUTO: 5.9 % (ref 5–12)
NEUTROPHILS NFR BLD AUTO: 6.83 10*3/MM3 (ref 1.7–7)
NEUTROPHILS NFR BLD AUTO: 65.7 % (ref 42.7–76)
NRBC BLD AUTO-RTO: 0 /100 WBC (ref 0–0.2)
PLATELET # BLD AUTO: 213 10*3/MM3 (ref 140–450)
PMV BLD AUTO: 11.7 FL (ref 6–12)
POTASSIUM SERPL-SCNC: 4.1 MMOL/L (ref 3.5–5.2)
PROT SERPL-MCNC: 6.9 G/DL (ref 6–8.5)
RBC # BLD AUTO: 4.34 10*6/MM3 (ref 4.14–5.8)
SODIUM SERPL-SCNC: 141 MMOL/L (ref 136–145)
TRIGL SERPL-MCNC: 206 MG/DL (ref 0–150)
TSH SERPL DL<=0.05 MIU/L-ACNC: 1.47 UIU/ML (ref 0.27–4.2)
VLDLC SERPL-MCNC: 34 MG/DL (ref 5–40)
WBC # BLD AUTO: 10.38 10*3/MM3 (ref 3.4–10.8)

## 2021-09-23 NOTE — PROGRESS NOTES
Per GEMINI Felix, Mr. Ramsay has been called with recent lab results & recommendations.  Continue current medications and follow-up as planned or sooner if any problems.

## 2021-09-23 NOTE — TELEPHONE ENCOUNTER
Per GEMINI Felix, Mr. Ramsay has been called with recent lab results & recommendations.  Continue current medications and follow-up as planned or sooner if any problems.     ----- Message from GEMINI Red sent at 9/23/2021  6:21 AM CDT -----  Triglycerides, while still elevated, have had a slight improvement as has his good cholesterol.  He needs to continue to adhere to a low-fat diet and continue with low impact exercises such as walking.  All other labs were essentially normal.

## 2021-10-14 DIAGNOSIS — G47.00 INSOMNIA, UNSPECIFIED TYPE: ICD-10-CM

## 2021-10-14 DIAGNOSIS — F41.9 ANXIETY: ICD-10-CM

## 2021-10-14 DIAGNOSIS — R25.1 TREMOR: ICD-10-CM

## 2021-10-14 RX ORDER — TRAZODONE HYDROCHLORIDE 50 MG/1
TABLET ORAL
Qty: 90 TABLET | Refills: 1 | Status: SHIPPED | OUTPATIENT
Start: 2021-10-14 | End: 2022-03-10

## 2021-10-14 RX ORDER — CARBIDOPA 25 MG/1
TABLET ORAL
Qty: 270 TABLET | Refills: 1 | Status: SHIPPED | OUTPATIENT
Start: 2021-10-14 | End: 2022-05-18

## 2021-10-16 ENCOUNTER — FLU SHOT (OUTPATIENT)
Dept: FAMILY MEDICINE CLINIC | Facility: CLINIC | Age: 71
End: 2021-10-16

## 2021-10-16 DIAGNOSIS — Z23 NEED FOR INFLUENZA VACCINATION: Primary | ICD-10-CM

## 2021-10-16 PROCEDURE — 90662 IIV NO PRSV INCREASED AG IM: CPT | Performed by: GENERAL PRACTICE

## 2021-10-16 PROCEDURE — G0008 ADMIN INFLUENZA VIRUS VAC: HCPCS | Performed by: GENERAL PRACTICE

## 2021-10-27 DIAGNOSIS — I10 ESSENTIAL HYPERTENSION: ICD-10-CM

## 2021-10-27 RX ORDER — LOSARTAN POTASSIUM AND HYDROCHLOROTHIAZIDE 25; 100 MG/1; MG/1
TABLET ORAL
Qty: 90 TABLET | Refills: 0 | Status: SHIPPED | OUTPATIENT
Start: 2021-10-27 | End: 2021-11-29

## 2021-11-21 DIAGNOSIS — F33.0 MILD EPISODE OF RECURRENT MAJOR DEPRESSIVE DISORDER (HCC): ICD-10-CM

## 2021-11-22 RX ORDER — PAROXETINE HYDROCHLORIDE HEMIHYDRATE 37.5 MG/1
TABLET, FILM COATED, EXTENDED RELEASE ORAL
Qty: 90 TABLET | Refills: 3 | Status: SHIPPED | OUTPATIENT
Start: 2021-11-22 | End: 2022-10-31

## 2021-11-23 NOTE — DISCHARGE INSTRUCTIONS
Keep the wound clean.  Follow-up with primary care/sore throat for reevaluation.  Return to ER for increasing swelling, redness, discharge, and excruciating pains, fever or chills.   No

## 2021-11-26 DIAGNOSIS — E11.9 TYPE 2 DIABETES MELLITUS WITHOUT COMPLICATION, WITHOUT LONG-TERM CURRENT USE OF INSULIN (HCC): ICD-10-CM

## 2021-11-26 DIAGNOSIS — I10 ESSENTIAL HYPERTENSION: ICD-10-CM

## 2021-11-29 RX ORDER — ESOMEPRAZOLE MAGNESIUM 40 MG/1
CAPSULE, DELAYED RELEASE ORAL
Qty: 90 CAPSULE | Refills: 1 | Status: SHIPPED | OUTPATIENT
Start: 2021-11-29 | End: 2022-06-27

## 2021-11-29 RX ORDER — LOSARTAN POTASSIUM AND HYDROCHLOROTHIAZIDE 25; 100 MG/1; MG/1
TABLET ORAL
Qty: 90 TABLET | Refills: 0 | Status: SHIPPED | OUTPATIENT
Start: 2021-11-29 | End: 2022-05-02

## 2021-11-29 RX ORDER — GLIPIZIDE 5 MG/1
5 TABLET ORAL
Qty: 180 TABLET | Refills: 3 | Status: SHIPPED | OUTPATIENT
Start: 2021-11-29 | End: 2022-11-28

## 2021-12-23 ENCOUNTER — OFFICE VISIT (OUTPATIENT)
Dept: FAMILY MEDICINE CLINIC | Facility: CLINIC | Age: 71
End: 2021-12-23

## 2021-12-23 VITALS
SYSTOLIC BLOOD PRESSURE: 122 MMHG | HEART RATE: 78 BPM | BODY MASS INDEX: 32.51 KG/M2 | HEIGHT: 68 IN | DIASTOLIC BLOOD PRESSURE: 70 MMHG | WEIGHT: 214.5 LBS | OXYGEN SATURATION: 100 %

## 2021-12-23 DIAGNOSIS — I10 ESSENTIAL HYPERTENSION: ICD-10-CM

## 2021-12-23 DIAGNOSIS — E78.2 MIXED HYPERLIPIDEMIA: ICD-10-CM

## 2021-12-23 DIAGNOSIS — Z00.00 MEDICARE ANNUAL WELLNESS VISIT, SUBSEQUENT: Primary | ICD-10-CM

## 2021-12-23 DIAGNOSIS — E11.9 TYPE 2 DIABETES MELLITUS WITHOUT COMPLICATION, WITHOUT LONG-TERM CURRENT USE OF INSULIN (HCC): ICD-10-CM

## 2021-12-23 DIAGNOSIS — K21.9 GASTROESOPHAGEAL REFLUX DISEASE WITHOUT ESOPHAGITIS: ICD-10-CM

## 2021-12-23 PROCEDURE — 1126F AMNT PAIN NOTED NONE PRSNT: CPT | Performed by: NURSE PRACTITIONER

## 2021-12-23 PROCEDURE — 1160F RVW MEDS BY RX/DR IN RCRD: CPT | Performed by: NURSE PRACTITIONER

## 2021-12-23 PROCEDURE — 1170F FXNL STATUS ASSESSED: CPT | Performed by: NURSE PRACTITIONER

## 2021-12-23 PROCEDURE — G0439 PPPS, SUBSEQ VISIT: HCPCS | Performed by: NURSE PRACTITIONER

## 2021-12-23 NOTE — PROGRESS NOTES
The ABCs of the Annual Wellness Visit  Subsequent Medicare Wellness Visit    Chief Complaint   Patient presents with   • Medicare Wellness-subsequent      Subjective    History of Present Illness:  Hung Ramsay is a 71 y.o. male who presents for a Subsequent Medicare Wellness Visit.    The following portions of the patient's history were reviewed and   updated as appropriate:   Current Outpatient Medications   Medication Sig Dispense Refill   • aspirin 81 MG EC tablet Take 81 mg by mouth Daily.     • carbidopa (LODOSYN) 25 MG tablet TAKE 1 TABLET BY MOUTH THREE TIMES A  tablet 1   • diclofenac (VOLTAREN) 75 MG EC tablet Take 1 tablet by mouth 2 (Two) Times a Day As Needed (pain). 180 tablet 2   • esomeprazole (nexIUM) 40 MG capsule TAKE 1 CAPSULE BY MOUTH EVERY DAY IN THE MORNING BEFORE BREAKFAST 90 capsule 1   • glipizide (GLUCOTROL) 5 MG tablet TAKE 1 TABLET BY MOUTH 2 (TWO) TIMES A DAY BEFORE MEALS. 180 tablet 3   • glucose blood test strip Use as instructed 100 each 3   • glucose monitor monitoring kit 1 each Daily. 1 each 0   • hydrALAZINE (APRESOLINE) 25 MG tablet Take 1 tablet by mouth 2 (Two) Times a Day. 180 tablet 1   • losartan-hydrochlorothiazide (HYZAAR) 100-25 MG per tablet TAKE 1 TABLET BY MOUTH EVERY DAY 90 tablet 0   • metFORMIN (GLUCOPHAGE) 1000 MG tablet TAKE 1 TABLET BY MOUTH TWICE A DAY WITH MEALS 180 tablet 1   • PARoxetine CR (PAXIL-CR) 37.5 MG 24 hr tablet TAKE 1 TABLET BY MOUTH EVERY DAY IN THE MORNING 90 tablet 3   • pravastatin (PRAVACHOL) 40 MG tablet TAKE 1 TABLET BY MOUTH EVERYDAY AT BEDTIME 90 tablet 2   • promethazine (PHENERGAN) 25 MG tablet Take 1 tablet by mouth Every 6 (Six) Hours As Needed for Nausea or Vomiting. 10 tablet 0   • sildenafil (REVATIO) 20 MG tablet Use 1-5 tablets as needed for erectile dysfunction. Do not exceed 100mg in 24 hours. 90 tablet 1   • traZODone (DESYREL) 50 MG tablet TAKE 1 TABLET BY MOUTH EVERY DAY AT NIGHT 90 tablet 1   • Vascepa 1 g  capsule capsule TAKE 2 CAPSULES BY MOUTH 2 TIMES A DAY WITH MEALS. 120 capsule 2     No current facility-administered medications for this visit.     Current Outpatient Medications on File Prior to Visit   Medication Sig   • aspirin 81 MG EC tablet Take 81 mg by mouth Daily.   • carbidopa (LODOSYN) 25 MG tablet TAKE 1 TABLET BY MOUTH THREE TIMES A DAY   • diclofenac (VOLTAREN) 75 MG EC tablet Take 1 tablet by mouth 2 (Two) Times a Day As Needed (pain).   • esomeprazole (nexIUM) 40 MG capsule TAKE 1 CAPSULE BY MOUTH EVERY DAY IN THE MORNING BEFORE BREAKFAST   • glipizide (GLUCOTROL) 5 MG tablet TAKE 1 TABLET BY MOUTH 2 (TWO) TIMES A DAY BEFORE MEALS.   • glucose blood test strip Use as instructed   • glucose monitor monitoring kit 1 each Daily.   • hydrALAZINE (APRESOLINE) 25 MG tablet Take 1 tablet by mouth 2 (Two) Times a Day.   • losartan-hydrochlorothiazide (HYZAAR) 100-25 MG per tablet TAKE 1 TABLET BY MOUTH EVERY DAY   • metFORMIN (GLUCOPHAGE) 1000 MG tablet TAKE 1 TABLET BY MOUTH TWICE A DAY WITH MEALS   • PARoxetine CR (PAXIL-CR) 37.5 MG 24 hr tablet TAKE 1 TABLET BY MOUTH EVERY DAY IN THE MORNING   • pravastatin (PRAVACHOL) 40 MG tablet TAKE 1 TABLET BY MOUTH EVERYDAY AT BEDTIME   • promethazine (PHENERGAN) 25 MG tablet Take 1 tablet by mouth Every 6 (Six) Hours As Needed for Nausea or Vomiting.   • sildenafil (REVATIO) 20 MG tablet Use 1-5 tablets as needed for erectile dysfunction. Do not exceed 100mg in 24 hours.   • traZODone (DESYREL) 50 MG tablet TAKE 1 TABLET BY MOUTH EVERY DAY AT NIGHT   • Vascepa 1 g capsule capsule TAKE 2 CAPSULES BY MOUTH 2 TIMES A DAY WITH MEALS.     Current Facility-Administered Medications on File Prior to Visit   Medication   • [DISCONTINUED] Testosterone Cypionate (DEPOTESTOTERONE CYPIONATE) injection 400 mg     He has No Known Allergies..    Compared to one year ago, the patient feels his physical   health is better.    Compared to one year ago, the patient feels his mental    health is better.    Recent Hospitalizations:  He was not admitted to the hospital during the last year.       Current Medical Providers:  Patient Care Team:  Elvira Ramsay APRN as PCP - General (Nurse Practitioner)  Dontae Cha DPM as Consulting Physician (Podiatry)    Outpatient Medications Prior to Visit   Medication Sig Dispense Refill   • aspirin 81 MG EC tablet Take 81 mg by mouth Daily.     • carbidopa (LODOSYN) 25 MG tablet TAKE 1 TABLET BY MOUTH THREE TIMES A  tablet 1   • diclofenac (VOLTAREN) 75 MG EC tablet Take 1 tablet by mouth 2 (Two) Times a Day As Needed (pain). 180 tablet 2   • esomeprazole (nexIUM) 40 MG capsule TAKE 1 CAPSULE BY MOUTH EVERY DAY IN THE MORNING BEFORE BREAKFAST 90 capsule 1   • glipizide (GLUCOTROL) 5 MG tablet TAKE 1 TABLET BY MOUTH 2 (TWO) TIMES A DAY BEFORE MEALS. 180 tablet 3   • glucose blood test strip Use as instructed 100 each 3   • glucose monitor monitoring kit 1 each Daily. 1 each 0   • hydrALAZINE (APRESOLINE) 25 MG tablet Take 1 tablet by mouth 2 (Two) Times a Day. 180 tablet 1   • losartan-hydrochlorothiazide (HYZAAR) 100-25 MG per tablet TAKE 1 TABLET BY MOUTH EVERY DAY 90 tablet 0   • metFORMIN (GLUCOPHAGE) 1000 MG tablet TAKE 1 TABLET BY MOUTH TWICE A DAY WITH MEALS 180 tablet 1   • PARoxetine CR (PAXIL-CR) 37.5 MG 24 hr tablet TAKE 1 TABLET BY MOUTH EVERY DAY IN THE MORNING 90 tablet 3   • pravastatin (PRAVACHOL) 40 MG tablet TAKE 1 TABLET BY MOUTH EVERYDAY AT BEDTIME 90 tablet 2   • promethazine (PHENERGAN) 25 MG tablet Take 1 tablet by mouth Every 6 (Six) Hours As Needed for Nausea or Vomiting. 10 tablet 0   • sildenafil (REVATIO) 20 MG tablet Use 1-5 tablets as needed for erectile dysfunction. Do not exceed 100mg in 24 hours. 90 tablet 1   • traZODone (DESYREL) 50 MG tablet TAKE 1 TABLET BY MOUTH EVERY DAY AT NIGHT 90 tablet 1   • Vascepa 1 g capsule capsule TAKE 2 CAPSULES BY MOUTH 2 TIMES A DAY WITH MEALS. 120 capsule 2  "    Facility-Administered Medications Prior to Visit   Medication Dose Route Frequency Provider Last Rate Last Admin   • Testosterone Cypionate (DEPOTESTOTERONE CYPIONATE) injection 400 mg  400 mg Intramuscular Q30 Days Elvira Ramsay, APRN   400 mg at 11/12/20 1417       No opioid medication identified on active medication list. I have reviewed chart for other potential  high risk medication/s and harmful drug interactions in the elderly.          Aspirin is on active medication list. Aspirin use is indicated based on review of current medical condition/s. Pros and cons of this therapy have been discussed today. Benefits of this medication outweigh potential harm.  Patient has been encouraged to continue taking this medication.  .      Patient Active Problem List   Diagnosis   • Essential hypertension   • Diabetes (HCC)   • GERD (gastroesophageal reflux disease)   • Insomnia   • Anxiety   • Mixed hyperlipidemia   • Precordial pain   • Abnormal EKG   • Left elbow pain   • Laceration of left elbow   • Cause of injury, fall   • Family history of GI malignancy   • Left upper quadrant pain     Advance Care Planning  Advance Directive is not on file.  ACP discussion was held with the patient during this visit. Patient has an advance directive (not in EMR), copy requested.          Objective    Vitals:    12/23/21 0823   BP: 122/70   Pulse: 78   SpO2: 100%   Weight: 97.3 kg (214 lb 8 oz)   Height: 172.7 cm (68\")   PainSc: 0-No pain     BMI Readings from Last 1 Encounters:   12/23/21 32.61 kg/m²   BMI is above normal parameters. Recommendations include: educational material and exercise counseling    Does the patient have evidence of cognitive impairment? No    Physical Exam            HEALTH RISK ASSESSMENT    Smoking Status:  Social History     Tobacco Use   Smoking Status Former Smoker   Smokeless Tobacco Former User   Tobacco Comment    quit 50 yrs ago     Alcohol Consumption:  Social History     Substance and Sexual " Activity   Alcohol Use No     Fall Risk Screen:    EMILY Fall Risk Assessment was completed, and patient is at LOW risk for falls.Assessment completed on:12/23/2021    Depression Screening:  PHQ-2/PHQ-9 Depression Screening 12/23/2021   Little interest or pleasure in doing things 0   Feeling down, depressed, or hopeless 0   Trouble falling or staying asleep, or sleeping too much 1   Feeling tired or having little energy 1   Poor appetite or overeating 0   Feeling bad about yourself - or that you are a failure or have let yourself or your family down 0   Trouble concentrating on things, such as reading the newspaper or watching television 0   Moving or speaking so slowly that other people could have noticed. Or the opposite - being so fidgety or restless that you have been moving around a lot more than usual 1   Thoughts that you would be better off dead, or of hurting yourself in some way 0   Total Score 3   If you checked off any problems, how difficult have these problems made it for you to do your work, take care of things at home, or get along with other people? Not difficult at all       Health Habits and Functional and Cognitive Screening:  Functional & Cognitive Status 12/23/2021   Do you have difficulty preparing food and eating? No   Do you have difficulty bathing yourself, getting dressed or grooming yourself? No   Do you have difficulty using the toilet? No   Do you have difficulty moving around from place to place? No   Do you have trouble with steps or getting out of a bed or a chair? No   Current Diet Well Balanced Diet   Dental Exam Up to date        Dental Exam Comment 2021   Eye Exam Up to date        Eye Exam Comment 2021   Exercise (times per week) 3 times per week        Exercise Frequency Comment -   Current Exercises Include Walking   Current Exercise Activities Include -   Do you need help using the phone?  No   Are you deaf or do you have serious difficulty hearing?  No   Do you need help  with transportation? No   Do you need help shopping? No   Do you need help preparing meals?  No   Do you need help with housework?  No   Do you need help with laundry? No   Do you need help taking your medications? No   Do you need help managing money? No   Do you ever drive or ride in a car without wearing a seat belt? No   Have you felt unusual stress, anger or loneliness in the last month? No   Who do you live with? Child   If you need help, do you have trouble finding someone available to you? No   Have you been bothered in the last four weeks by sexual problems? No   Do you have difficulty concentrating, remembering or making decisions? No       Age-appropriate Screening Schedule:  Refer to the list below for future screening recommendations based on patient's age, sex and/or medical conditions. Orders for these recommended tests are listed in the plan section. The patient has been provided with a written plan.    Health Maintenance   Topic Date Due   • HEMOGLOBIN A1C  03/22/2022   • DIABETIC EYE EXAM  06/24/2022   • DIABETIC FOOT EXAM  09/22/2022   • LIPID PANEL  09/22/2022   • URINE MICROALBUMIN  09/22/2022   • COLONOSCOPY  05/08/2024   • TDAP/TD VACCINES (3 - Td or Tdap) 12/02/2028   • INFLUENZA VACCINE  Completed   • ZOSTER VACCINE  Addressed              Assessment/Plan   CMS Preventative Services Quick Reference  Risk Factors Identified During Encounter  Cardiovascular Disease  The above risks/problems have been discussed with the patient.  Follow up actions/plans if indicated are seen below in the Assessment/Plan Section.  Pertinent information has been shared with the patient in the After Visit Summary.    Diagnoses and all orders for this visit:    1. Medicare annual wellness visit, subsequent (Primary)    2. Essential hypertension    3. Mixed hyperlipidemia    4. Type 2 diabetes mellitus without complication, without long-term current use of insulin (HCC)    5. Gastroesophageal reflux disease without  esophagitis        Follow Up:   Return in about 6 months (around 6/23/2022) for Recheck.     An After Visit Summary and PPPS were made available to the patient.        I spent 23 minutes caring for Hung on this date of service. This time includes time spent by me in the following activities:preparing for the visit, reviewing tests, obtaining and/or reviewing a separately obtained history, performing a medically appropriate examination and/or evaluation , counseling and educating the patient/family/caregiver and documenting information in the medical record         This document has been electronically signed by GEMINI Red on December 23, 2021 09:12 CST

## 2022-01-10 DIAGNOSIS — E11.9 TYPE 2 DIABETES MELLITUS WITHOUT COMPLICATION, WITHOUT LONG-TERM CURRENT USE OF INSULIN: ICD-10-CM

## 2022-02-19 DIAGNOSIS — E78.5 HYPERLIPIDEMIA, UNSPECIFIED HYPERLIPIDEMIA TYPE: ICD-10-CM

## 2022-02-21 DIAGNOSIS — N52.9 ERECTILE DYSFUNCTION, UNSPECIFIED ERECTILE DYSFUNCTION TYPE: ICD-10-CM

## 2022-02-21 RX ORDER — SILDENAFIL CITRATE 20 MG/1
TABLET ORAL
Qty: 90 TABLET | Refills: 1 | Status: SHIPPED | OUTPATIENT
Start: 2022-02-21

## 2022-02-21 RX ORDER — PRAVASTATIN SODIUM 40 MG
TABLET ORAL
Qty: 90 TABLET | Refills: 2 | Status: SHIPPED | OUTPATIENT
Start: 2022-02-21 | End: 2022-11-16

## 2022-03-09 DIAGNOSIS — G47.00 INSOMNIA, UNSPECIFIED TYPE: ICD-10-CM

## 2022-03-09 DIAGNOSIS — F41.9 ANXIETY: ICD-10-CM

## 2022-03-10 RX ORDER — TRAZODONE HYDROCHLORIDE 50 MG/1
TABLET ORAL
Qty: 90 TABLET | Refills: 1 | Status: SHIPPED | OUTPATIENT
Start: 2022-03-10 | End: 2022-10-31

## 2022-04-30 DIAGNOSIS — I10 ESSENTIAL HYPERTENSION: ICD-10-CM

## 2022-05-02 RX ORDER — LOSARTAN POTASSIUM AND HYDROCHLOROTHIAZIDE 25; 100 MG/1; MG/1
TABLET ORAL
Qty: 90 TABLET | Refills: 0 | Status: SHIPPED | OUTPATIENT
Start: 2022-05-02 | End: 2022-06-27

## 2022-05-18 DIAGNOSIS — R25.1 TREMOR: ICD-10-CM

## 2022-05-18 RX ORDER — CARBIDOPA 25 MG/1
TABLET ORAL
Qty: 270 TABLET | Refills: 1 | Status: SHIPPED | OUTPATIENT
Start: 2022-05-18 | End: 2022-11-28

## 2022-06-26 DIAGNOSIS — I10 ESSENTIAL HYPERTENSION: ICD-10-CM

## 2022-06-27 RX ORDER — LOSARTAN POTASSIUM AND HYDROCHLOROTHIAZIDE 25; 100 MG/1; MG/1
TABLET ORAL
Qty: 90 TABLET | Refills: 0 | Status: SHIPPED | OUTPATIENT
Start: 2022-06-27 | End: 2022-11-28

## 2022-06-27 RX ORDER — ESOMEPRAZOLE MAGNESIUM 40 MG/1
CAPSULE, DELAYED RELEASE ORAL
Qty: 90 CAPSULE | Refills: 1 | Status: SHIPPED | OUTPATIENT
Start: 2022-06-27 | End: 2022-11-28

## 2022-08-24 DIAGNOSIS — Z12.5 ENCOUNTER FOR SCREENING FOR MALIGNANT NEOPLASM OF PROSTATE: ICD-10-CM

## 2022-08-24 DIAGNOSIS — I10 ESSENTIAL HYPERTENSION: Primary | ICD-10-CM

## 2022-08-24 DIAGNOSIS — E11.9 TYPE 2 DIABETES MELLITUS WITHOUT COMPLICATION, WITHOUT LONG-TERM CURRENT USE OF INSULIN: ICD-10-CM

## 2022-08-24 DIAGNOSIS — E78.5 HYPERLIPIDEMIA, UNSPECIFIED HYPERLIPIDEMIA TYPE: ICD-10-CM

## 2022-08-24 DIAGNOSIS — Z13.29 SCREENING FOR HYPOTHYROIDISM: ICD-10-CM

## 2022-08-30 ENCOUNTER — LAB (OUTPATIENT)
Dept: LAB | Facility: HOSPITAL | Age: 72
End: 2022-08-30

## 2022-08-30 DIAGNOSIS — E11.9 TYPE 2 DIABETES MELLITUS WITHOUT COMPLICATION, WITHOUT LONG-TERM CURRENT USE OF INSULIN: ICD-10-CM

## 2022-08-30 DIAGNOSIS — E78.5 HYPERLIPIDEMIA, UNSPECIFIED HYPERLIPIDEMIA TYPE: ICD-10-CM

## 2022-08-30 DIAGNOSIS — Z13.29 SCREENING FOR HYPOTHYROIDISM: ICD-10-CM

## 2022-08-30 DIAGNOSIS — Z12.5 ENCOUNTER FOR SCREENING FOR MALIGNANT NEOPLASM OF PROSTATE: ICD-10-CM

## 2022-08-30 DIAGNOSIS — I10 ESSENTIAL HYPERTENSION: ICD-10-CM

## 2022-08-30 PROCEDURE — 80053 COMPREHEN METABOLIC PANEL: CPT

## 2022-08-30 PROCEDURE — G0103 PSA SCREENING: HCPCS

## 2022-08-30 PROCEDURE — 80061 LIPID PANEL: CPT

## 2022-08-30 PROCEDURE — 83036 HEMOGLOBIN GLYCOSYLATED A1C: CPT

## 2022-08-30 PROCEDURE — 85025 COMPLETE CBC W/AUTO DIFF WBC: CPT

## 2022-08-30 PROCEDURE — 84443 ASSAY THYROID STIM HORMONE: CPT

## 2022-08-30 PROCEDURE — 36415 COLL VENOUS BLD VENIPUNCTURE: CPT

## 2022-08-31 ENCOUNTER — OFFICE VISIT (OUTPATIENT)
Dept: FAMILY MEDICINE CLINIC | Facility: CLINIC | Age: 72
End: 2022-08-31

## 2022-08-31 VITALS
HEIGHT: 68 IN | OXYGEN SATURATION: 99 % | BODY MASS INDEX: 32.95 KG/M2 | DIASTOLIC BLOOD PRESSURE: 76 MMHG | WEIGHT: 217.4 LBS | SYSTOLIC BLOOD PRESSURE: 144 MMHG | HEART RATE: 90 BPM

## 2022-08-31 DIAGNOSIS — R21 RASH: Primary | ICD-10-CM

## 2022-08-31 DIAGNOSIS — Z23 NEED FOR PNEUMOCOCCAL VACCINATION: ICD-10-CM

## 2022-08-31 DIAGNOSIS — R26.89 LOSS OF BALANCE: ICD-10-CM

## 2022-08-31 LAB
ALBUMIN SERPL-MCNC: 4.4 G/DL (ref 3.5–5.2)
ALBUMIN/GLOB SERPL: 1.6 G/DL
ALP SERPL-CCNC: 75 U/L (ref 39–117)
ALT SERPL W P-5'-P-CCNC: 13 U/L (ref 1–41)
ANION GAP SERPL CALCULATED.3IONS-SCNC: 12.8 MMOL/L (ref 5–15)
AST SERPL-CCNC: 15 U/L (ref 1–40)
BASOPHILS # BLD AUTO: 0.02 10*3/MM3 (ref 0–0.2)
BASOPHILS NFR BLD AUTO: 0.2 % (ref 0–1.5)
BILIRUB SERPL-MCNC: 0.4 MG/DL (ref 0–1.2)
BUN SERPL-MCNC: 17 MG/DL (ref 8–23)
BUN/CREAT SERPL: 16.5 (ref 7–25)
CALCIUM SPEC-SCNC: 9 MG/DL (ref 8.6–10.5)
CHLORIDE SERPL-SCNC: 105 MMOL/L (ref 98–107)
CHOLEST SERPL-MCNC: 147 MG/DL (ref 0–200)
CO2 SERPL-SCNC: 23.2 MMOL/L (ref 22–29)
CREAT SERPL-MCNC: 1.03 MG/DL (ref 0.76–1.27)
DEPRECATED RDW RBC AUTO: 42.4 FL (ref 37–54)
EGFRCR SERPLBLD CKD-EPI 2021: 77.2 ML/MIN/1.73
EOSINOPHIL # BLD AUTO: 0.08 10*3/MM3 (ref 0–0.4)
EOSINOPHIL NFR BLD AUTO: 0.7 % (ref 0.3–6.2)
ERYTHROCYTE [DISTWIDTH] IN BLOOD BY AUTOMATED COUNT: 12.8 % (ref 12.3–15.4)
GLOBULIN UR ELPH-MCNC: 2.7 GM/DL
GLUCOSE SERPL-MCNC: 147 MG/DL (ref 65–99)
HBA1C MFR BLD: 5.4 % (ref 4.8–5.6)
HCT VFR BLD AUTO: 41.2 % (ref 37.5–51)
HDLC SERPL-MCNC: 30 MG/DL (ref 40–60)
HGB BLD-MCNC: 14.1 G/DL (ref 13–17.7)
IMM GRANULOCYTES # BLD AUTO: 0.04 10*3/MM3 (ref 0–0.05)
IMM GRANULOCYTES NFR BLD AUTO: 0.4 % (ref 0–0.5)
LDLC SERPL CALC-MCNC: 75 MG/DL (ref 0–100)
LDLC/HDLC SERPL: 2.19 {RATIO}
LYMPHOCYTES # BLD AUTO: 1.93 10*3/MM3 (ref 0.7–3.1)
LYMPHOCYTES NFR BLD AUTO: 18 % (ref 19.6–45.3)
MCH RBC QN AUTO: 30.9 PG (ref 26.6–33)
MCHC RBC AUTO-ENTMCNC: 34.2 G/DL (ref 31.5–35.7)
MCV RBC AUTO: 90.4 FL (ref 79–97)
MONOCYTES # BLD AUTO: 0.68 10*3/MM3 (ref 0.1–0.9)
MONOCYTES NFR BLD AUTO: 6.3 % (ref 5–12)
NEUTROPHILS NFR BLD AUTO: 7.98 10*3/MM3 (ref 1.7–7)
NEUTROPHILS NFR BLD AUTO: 74.4 % (ref 42.7–76)
NRBC BLD AUTO-RTO: 0 /100 WBC (ref 0–0.2)
PLATELET # BLD AUTO: 218 10*3/MM3 (ref 140–450)
PMV BLD AUTO: 11.7 FL (ref 6–12)
POTASSIUM SERPL-SCNC: 4.3 MMOL/L (ref 3.5–5.2)
PROT SERPL-MCNC: 7.1 G/DL (ref 6–8.5)
PSA SERPL-MCNC: 0.58 NG/ML (ref 0–4)
RBC # BLD AUTO: 4.56 10*6/MM3 (ref 4.14–5.8)
SODIUM SERPL-SCNC: 141 MMOL/L (ref 136–145)
TRIGL SERPL-MCNC: 257 MG/DL (ref 0–150)
TSH SERPL DL<=0.05 MIU/L-ACNC: 1.61 UIU/ML (ref 0.27–4.2)
VLDLC SERPL-MCNC: 42 MG/DL (ref 5–40)
WBC NRBC COR # BLD: 10.73 10*3/MM3 (ref 3.4–10.8)

## 2022-08-31 PROCEDURE — G0009 ADMIN PNEUMOCOCCAL VACCINE: HCPCS | Performed by: NURSE PRACTITIONER

## 2022-08-31 PROCEDURE — 90677 PCV20 VACCINE IM: CPT | Performed by: NURSE PRACTITIONER

## 2022-08-31 PROCEDURE — 99214 OFFICE O/P EST MOD 30 MIN: CPT | Performed by: NURSE PRACTITIONER

## 2022-08-31 RX ORDER — NYSTATIN 100000 U/G
1 CREAM TOPICAL 2 TIMES DAILY
Qty: 30 G | Refills: 1 | Status: SHIPPED | OUTPATIENT
Start: 2022-08-31

## 2022-09-01 ENCOUNTER — TELEPHONE (OUTPATIENT)
Dept: FAMILY MEDICINE CLINIC | Facility: CLINIC | Age: 72
End: 2022-09-01

## 2022-09-01 NOTE — TELEPHONE ENCOUNTER
Per GEMINI Felix, Mr. Ramsay has been called with recent lab results & recommendations.  Continue current medications and follow-up as planned or sooner if any problems.     Elvira, Yes, he said he is still taking the Vascepa.      ----- Message from GEMINI Red sent at 8/31/2022  7:30 AM CDT -----  Fasting glucose was elevated at 147 but hemoglobin A1c remains within normal limits.  He needs to reduce concentrated sweets and carbohydrates in his diet.  This is more likely due to what ever he ate the night before he had the blood draw.  Triglycerides increased and are up to 257.  He needs to reduce saturated fats in his diet and increase low impact exercising.  Please verify that he is still taking his Vascepa.  All other labs were essentially normal.

## 2022-09-02 ENCOUNTER — TELEPHONE (OUTPATIENT)
Dept: FAMILY MEDICINE CLINIC | Facility: CLINIC | Age: 72
End: 2022-09-02

## 2022-09-02 NOTE — TELEPHONE ENCOUNTER
[id number] Responded to [employer] called in [facility]. Martínez Garza RN (oncoming nurse) by Remy Donahue RN (offgoing nurse). Report included the following information SBAR, Kardex, ED Summary, Procedure Summary, Intake/Output, MAR, Recent Results, Cardiac Rhythm NSR with LBBB and frequent PVCs/ Junctional, Alarm Parameters  and Dual Neuro Assessment. Per GEMINI Felix, Mr. Ramsay has been called with additional recommendations.  Continue current medications and follow-up as planned or sooner if any problems.       ----- Message from GEMINI Red sent at 9/1/2022  1:27 PM CDT -----  Weight loss would also be beneficial in helping to reduce triglycerides.  Continue medications as prescribed.  ----- Message -----  From: Nakita Ames LPN  Sent: 9/1/2022  12:24 PM CDT  To: GEMINI Red    Per GEMINI Felix, Mr. Ramsay has been called with recent lab results & recommendations.  Continue current medications and follow-up as planned or sooner if any problems.     Elvira, Yes, he said he is still taking the Vascepa.

## 2022-09-02 NOTE — PROGRESS NOTES
Per GEMINI Felix, Mr. Ramsay has been called with additional recommendations.  Continue current medications and follow-up as planned or sooner if any problems.

## 2022-09-08 ENCOUNTER — HOSPITAL ENCOUNTER (OUTPATIENT)
Dept: PHYSICAL THERAPY | Facility: HOSPITAL | Age: 72
Setting detail: THERAPIES SERIES
Discharge: HOME | End: 2022-09-08

## 2022-09-08 DIAGNOSIS — R26.89 LOSS OF BALANCE: Primary | ICD-10-CM

## 2022-09-08 PROCEDURE — 97162 PT EVAL MOD COMPLEX 30 MIN: CPT | Performed by: PHYSICAL THERAPIST

## 2022-09-08 NOTE — THERAPY EVALUATION
Outpatient Physical Therapy Ortho Initial Evaluation  [facility]     Patient Name: Hung Ramsay  : 1950  MRN: [id number]  Today's Date: 2022      Visit Date: 2022  Visit   Return to MD: ALONA  Re-certification date: 22  Patient Active Problem List   Diagnosis   • Essential hypertension   • Diabetes (HCC)   • GERD (gastroesophageal reflux disease)   • Insomnia   • Anxiety   • Mixed hyperlipidemia   • Precordial pain   • Abnormal EKG   • Left elbow pain   • Laceration of left elbow   • Cause of injury, fall   • Family history of GI malignancy   • Left upper quadrant pain        Past Medical History:   Diagnosis Date   • Anxiety    • Diabetes (HCC)    • Essential hypertension    • GERD (gastroesophageal reflux disease)    • Hyperlipidemia    • Insomnia    • Type 2 diabetes mellitus (HCC)         Past Surgical History:   Procedure Laterality Date   • COLECTOMY PARTIAL / TOTAL     • COLONOSCOPY N/A 2019    Procedure: COLONOSCOPY;  Surgeon: Cesar Santos MD;  Location: [facility] ENDOSCOPY;  Service: Gastroenterology   • ENDOSCOPY AND COLONOSCOPY  2012       Visit Dx:     ICD-10-CM ICD-9-CM   1. Loss of balance  R26.89 781.99         Medications (Admitted on 2022)         aspirin 81 MG EC tablet    carbidopa (LODOSYN) 25 MG tablet    diclofenac (VOLTAREN) 75 MG EC tablet    esomeprazole (nexIUM) 40 MG capsule    glipizide (GLUCOTROL) 5 MG tablet    glucose blood test strip    glucose monitor monitoring kit    hydrALAZINE (APRESOLINE) 25 MG tablet    losartan-hydrochlorothiazide (HYZAAR) 100-25 MG per tablet    metFORMIN (GLUCOPHAGE) 1000 MG tablet    nystatin (MYCOSTATIN) 239488 UNIT/GM cream    PARoxetine CR (PAXIL-CR) 37.5 MG 24 hr tablet    pravastatin (PRAVACHOL) 40 MG tablet    sildenafil (REVATIO) 20 MG tablet    traZODone (DESYREL) 50 MG tablet    Vascepa 1 g capsule capsule    Allergies: NKA     PT Ortho     Row Name 22 0171       Subjective Comments     Subjective Comments 70 yo male with loss in balance over the past 6 months. No dizziness. Insidious onset. Has a h/o L knee pain with a meniscus tear and L ankle pain as well. L knee and L ankle pain exacerbated with extensive walking on uneven terrain such as a golf course (plays golf recreationally). Intermittently his legs get painful and told he has excessive supination with the L ankle. Has trouble getting out of his chair due to weakness. Fell out of bed 3 times over the past 6 months with no head trauma each time.  -BS       Precautions and Contraindications    Precautions/Limitations fall precautions  -BS       Subjective Pain    Able to rate subjective pain? yes  -BS    Pre-Treatment Pain Level 0  L knee and L ankle  -BS    Post-Treatment Pain Level 0  -BS       Posture/Observations    Posture/Observations Comments severe L ankle supination, genu varum on L LE, L foot collapses into excessive supination with ambulation  -BS       General ROM    GENERAL ROM COMMENTS AROM: B hip/knee grossly WFL  -BS       MMT (Manual Muscle Testing)    General MMT Comments BLE's grossly 5/5 except B hip ext 4/5  -BS       Sensation    Light Touch No apparent deficits  -BS    Additional Comments mildly ataxic B LE's, heel to toe taps  -BS       Flexibility    Flexibility Tested? Lower Extremity  -BS       Lower Extremity Flexibility    Hamstrings Bilateral:;Moderately limited  -BS    Hip External Rotators Bilateral:;Moderately limited  -BS          User Key  (r) = Recorded By, (t) = Taken By, (c) = Cosigned By    Initials Name Provider Type    Emery Shanks, PT Physical Therapist                                   PT OP Goals     Row Name 09/08/22 9735          PT Short Term Goals    STG Date to Achieve 09/29/22  -BS     STG 1 Improve 5x's STS test to </=13 seconds  -BS     STG 1 Progress New  -BS     STG 2 Improve Kent balance test score to >/=52/56  -BS     STG 2 Progress New  -BS            Time Calculation    PT Goal  Re-Cert Due Date 09/29/22  -BS           User Key  (r) = Recorded By, (t) = Taken By, (c) = Cosigned By    Initials Name Provider Type    Emery Shanks, PT Physical Therapist                 PT Assessment/Plan     Row Name 09/08/22 1432          PT Assessment    Functional Limitations Performance in sport activities;Performance in leisure activities;Impaired gait  -BS     Impairments Balance;Endurance;Impaired flexibility;Muscle strength  -BS     Assessment Comments 72 yo male with loss of balance over the past 6 months. Scored 49/56 on the Kent Balance test showing a low fall risk, limited speed, balance and endurance with his outcomes on the 5x's sit to stand test as well. Presents with altered gait mechanics, severe L foot supination noted with genu varum as well on the L LE. Would benefit from skilled PT to address hip weakness and impaired balance.  -BS     Please refer to paper survey for additional self-reported information Yes  -BS     Rehab Potential Good  -BS     Patient/caregiver participated in establishment of treatment plan and goals Yes  -BS     Patient would benefit from skilled therapy intervention Yes  -BS            PT Plan    PT Frequency 1x/week;2x/week  -BS     Predicted Duration of Therapy Intervention (PT) 3-4 weeks then TBD  -BS     Planned CPT's? PT EVAL MOD COMPLELITY: 73366;PT RE-EVAL: 12994;PT THER PROC EA 15 MIN: 37176;PT THER ACT EA 15 MIN: 72502;PT MANUAL THERAPY EA 15 MIN: 26392;PT NEUROMUSC RE-EDUCATION EA 15 MIN: 35127;PT GAIT TRAINING EA 15 MIN: 89455;PT HOT OR COLD PACK TREAT MCARE;PT THER SUPP EA 15 MIN  -BS     Physical Therapy Interventions (Optional Details) balance training;home exercise program;lumbar stabilization;manual therapy techniques;modalities;neuromuscular re-education;patient/family education;stair training;strengthening;stretching;transfer training  -BS     PT Plan Comments Address B hip abductor strengthening. Narrow CATA on Airex, step ups, sidestepping.   -BS           User Key  (r) = Recorded By, (t) = Taken By, (c) = Cosigned By    Initials Name Provider Type    Emery Shanks, PT Physical Therapist                   OP Exercises     Row Name 09/08/22 1068             Subjective Comments    Subjective Comments 70 yo male with loss in balance over the past 6 months. No dizziness. Insidious onset. Has a h/o L knee pain with a meniscus tear and L ankle pain as well. L knee and L ankle pain exacerbated with extensive walking on uneven terrain such as a golf course (plays golf recreationally). Intermittently his legs get painful and told he has excessive supination with the L ankle. Has trouble getting out of his chair due to weakness. Fell out of bed 3 times over the past 6 months with no head trauma each time.  -BS              Subjective Pain    Able to rate subjective pain? yes  -BS      Pre-Treatment Pain Level 0  L knee and L ankle  -BS      Post-Treatment Pain Level 0  -BS            User Key  (r) = Recorded By, (t) = Taken By, (c) = Cosigned By    Initials Name Provider Type    Emery Shanks, PT Physical Therapist                              Outcome Measure Options: 5x Sit to Stand, Kent Balance  5 Times Sit to Stand  5 Times Sit to Stand (seconds): 16.91 seconds  5 Times Sit to Stand Comments: B UEA  Kent Balance Scale  Sitting to Standing: able to stand without using hands and stabilize independently  Standing Unsupported: able to stand safely for 2 minutes  Sitting with Back Unsupported but Feet Supported on Floor or on Stool: able to sit safely and securely for 2 minutes  Standing to Sitting: sits safely with minimal use of hands  Transfers: able to transfer safely with minor use of hands  Standing Unsupported with Eyes Closed: able to stand 10 seconds safely  Standing Unsupported with Feet Together: able to place feet together independently and stand 1 minute safely  Reaching Forward with Outstretched Arm While Standing: can reach forward  confidently 25 cm (10 inches)   Object From the Floor From a Standing Position: able to  object safely and easily  Turning to Look Behind Over Left and Right Shoulders While Standing: looks behind one side only other side shows less weight shift  Turn 360 Degrees: able to turn 360 degrees safely in 4 seconds or less  Place Alternate Foot on Step or Stool While Standing Unsupported: able to complete 4 steps without aid with supervision  Standing Unsupported with One Foot in Front: able to place foot ahead independently and hold 30 seconds  Standing on One Leg: tries to lift leg unable to hold 3 seconds but remains standing independently  Kent Total Score: 49  Tinetti Assessment  Tinetti Assessment: yes      Time Calculation:     Start Time: 1432  Stop Time: 1504  Time Calculation (min): 32 min  Total Timed Code Minutes- PT: 32 minute(s)     Therapy Charges for Today     Code Description Service Date Service Provider Modifiers Qty    02634730237 HC PT EVAL MOD COMPLEXITY 3 9/8/2022 Emery Jenkins, PT GP 1          PT G-Codes  Outcome Measure Options: 5x Sit to Stand, Kent Balance  Kent Total Score: 49         Emery Jenkins, PT  9/8/2022

## 2022-09-19 ENCOUNTER — HOSPITAL ENCOUNTER (OUTPATIENT)
Dept: PHYSICAL THERAPY | Facility: HOSPITAL | Age: 72
Setting detail: THERAPIES SERIES
Discharge: HOME | End: 2022-09-19

## 2022-09-19 DIAGNOSIS — R26.89 LOSS OF BALANCE: Primary | ICD-10-CM

## 2022-09-19 PROCEDURE — 97110 THERAPEUTIC EXERCISES: CPT

## 2022-09-19 NOTE — THERAPY TREATMENT NOTE
Outpatient Physical Therapy Ortho Treatment Note  [facility]     Patient Name: Hung Ramsay  : 1950  MRN: [id number]  Today's Date: 2022      Visit Date: 2022     Subjective Improvement 0  Visits 2/2  Visits approved ?  RTMD PRN  Recert Date 2022    Loss of balance      Visit Dx:    ICD-10-CM ICD-9-CM   1. Loss of balance  R26.89 781.99       Patient Active Problem List   Diagnosis   • Essential hypertension   • Diabetes (HCC)   • GERD (gastroesophageal reflux disease)   • Insomnia   • Anxiety   • Mixed hyperlipidemia   • Precordial pain   • Abnormal EKG   • Left elbow pain   • Laceration of left elbow   • Cause of injury, fall   • Family history of GI malignancy   • Left upper quadrant pain        Past Medical History:   Diagnosis Date   • Anxiety    • Diabetes (HCC)    • Essential hypertension    • GERD (gastroesophageal reflux disease)    • Hyperlipidemia    • Insomnia    • Type 2 diabetes mellitus (HCC)         Past Surgical History:   Procedure Laterality Date   • COLECTOMY PARTIAL / TOTAL     • COLONOSCOPY N/A 2019    Procedure: COLONOSCOPY;  Surgeon: Cesar Santos MD;  Location: [facility] ENDOSCOPY;  Service: Gastroenterology   • ENDOSCOPY AND COLONOSCOPY  2012        PT Ortho     Row Name 22 1400       Precautions and Contraindications    Precautions/Limitations fall precautions  -CP       Subjective Pain    Able to rate subjective pain? yes  -CP    Pre-Treatment Pain Level 4  -CP    Subjective Pain Comment left ankle and knee  -CP          User Key  (r) = Recorded By, (t) = Taken By, (c) = Cosigned By    Initials Name Provider Type    CP Tiffanie Webb, PTA Physical Therapist Assistant                             PT Assessment/Plan     Row Name 22 1448 22 1406       PT Assessment    Functional Limitations Decreased safety during functional activities  -CP --    Assessment Comments Requested order from referring MD for custom orthotics.   "-CP Patient arrived 15 minutes late for his appointment today  -CP       PT Plan    PT Frequency 1x/week;2x/week  -CP --    Predicted Duration of Therapy Intervention (PT) 3-4 weeks  -CP --    PT Plan Comments Cont with POC. side stepping, resisted walks on CC  -CP --          User Key  (r) = Recorded By, (t) = Taken By, (c) = Cosigned By    Initials Name Provider Type    CP Tiffanie Webb, PTA Physical Therapist Assistant                   OP Exercises     Row Name 09/19/22 1456 09/19/22 1400          Subjective Comments    Subjective Comments -- Patient states that he hasnt fallen but at times feels off balance.  He thinks that it is because of his left ankle weakness. He states that he did have custom orthotics which seemed to help  -CP            Subjective Pain    Able to rate subjective pain? -- yes  -CP     Pre-Treatment Pain Level -- 4  -CP     Post-Treatment Pain Level -- 3  -CP     Subjective Pain Comment -- left ankle and knee  -CP            Total Minutes    72661 - PT Therapeutic Exercise Minutes 38  -CP --            Exercise 1    Exercise Name 1 -- patient arrived 15 minutes late for his appointment today  -CP            Exercise 2    Exercise Name 2 -- Pro II level 3  -CP     Time 2 -- 10  -CP            Exercise 3    Exercise Name 3 -- incline stretch  -CP     Cueing 3 -- Verbal;Demo  -CP     Sets 3 -- 3  -CP     Time 3 -- 30\" holds  -CP            Exercise 4    Exercise Name 4 -- CR/TR  -CP     Cueing 4 -- Verbal;Demo  -CP     Sets 4 -- 2  -CP     Reps 4 -- 10  -CP            Exercise 5    Exercise Name 5 -- LAQ  -CP     Cueing 5 -- Verbal;Demo  -CP     Sets 5 -- 2  -CP     Reps 5 -- 10  -CP     Time 5 -- 5\" holds  -CP            Exercise 6    Exercise Name 6 -- Tband ankle 4 way  -CP     Cueing 6 -- Verbal;Tactile;Demo  -CP     Sets 6 -- 2  -CP     Reps 6 -- 10 each  -CP     Time 6 -- red  -CP            Exercise 7    Exercise Name 7 -- SLR  -CP     Cueing 7 -- Verbal;Tactile  -CP     Sets 7 -- " "2  -CP     Reps 7 -- 10  -CP     Time 7 -- 3\" holds  -CP            Exercise 8    Exercise Name 8 -- review HEP  -CP           User Key  (r) = Recorded By, (t) = Taken By, (c) = Cosigned By    Initials Name Provider Type    CP Tiffanie Webb PTA Physical Therapist Assistant                              PT OP Goals     Row Name 09/19/22 1400          PT Short Term Goals    STG Date to Achieve 09/29/22  -CP     STG 1 Improve 5x's STS test to </=13 seconds  -CP     STG 1 Progress New  -CP     STG 2 Improve Kent balance test score to >/=52/56  -CP     STG 2 Progress New  -CP            Time Calculation    PT Goal Re-Cert Due Date 09/29/22  -CP           User Key  (r) = Recorded By, (t) = Taken By, (c) = Cosigned By    Initials Name Provider Type    CP Tiffanie Webb PTA Physical Therapist Assistant                Therapy Education  Education Details: LAQ. SLR. ankle tband 4 way  Given: HEP  Program: New  How Provided: Verbal, Demonstration, Written  Provided to: Patient  Level of Understanding: Teach back education performed, Verbalized, Demonstrated              Time Calculation:   Start Time: 1400  Stop Time: 1439  Time Calculation (min): 39 min  Total Timed Code Minutes- PT: 38 minute(s)  Timed Charges  48163 - PT Therapeutic Exercise Minutes: 38  Total Minutes  Timed Charges Total Minutes: 38   Total Minutes: 38  Therapy Charges for Today     Code Description Service Date Service Provider Modifiers Qty    08956318335 HC PT THER PROC EA 15 MIN 9/19/2022 Tiffanie Webb PTA GP, CQ 3                    Tiffanie Webb PTA  9/19/2022     "

## 2022-09-20 DIAGNOSIS — G89.29 CHRONIC PAIN OF LEFT KNEE: Primary | ICD-10-CM

## 2022-09-20 DIAGNOSIS — M25.562 CHRONIC PAIN OF LEFT KNEE: Primary | ICD-10-CM

## 2022-09-20 DIAGNOSIS — G89.29 CHRONIC PAIN OF BOTH ANKLES: ICD-10-CM

## 2022-09-20 DIAGNOSIS — M25.572 CHRONIC PAIN OF BOTH ANKLES: ICD-10-CM

## 2022-09-20 DIAGNOSIS — M25.571 CHRONIC PAIN OF BOTH ANKLES: ICD-10-CM

## 2022-09-21 ENCOUNTER — APPOINTMENT (OUTPATIENT)
Dept: PHYSICAL THERAPY | Facility: HOSPITAL | Age: 72
End: 2022-09-21

## 2022-09-28 ENCOUNTER — HOSPITAL ENCOUNTER (OUTPATIENT)
Dept: PHYSICAL THERAPY | Facility: HOSPITAL | Age: 72
Setting detail: THERAPIES SERIES
Discharge: HOME | End: 2022-09-28

## 2022-09-28 DIAGNOSIS — R26.89 LOSS OF BALANCE: Primary | ICD-10-CM

## 2022-09-28 PROCEDURE — 97112 NEUROMUSCULAR REEDUCATION: CPT

## 2022-09-28 PROCEDURE — 97110 THERAPEUTIC EXERCISES: CPT

## 2022-09-28 NOTE — THERAPY TREATMENT NOTE
Outpatient Physical Therapy Ortho Treatment Note  [facility]     Patient Name: Hung Ramsay  : 1950  MRN: [id number]  Today's Date: 2022      Visit Date: 2022     ATTENDANCE: 3/3  SUBJECTIVE IMPROVEMENT: not assessed  NEXT MD APPOINTMENT: PRN  RECERT DATE: 22    THERAPY DIAGNOSIS: balance       Visit Dx:    ICD-10-CM ICD-9-CM   1. Loss of balance  R26.89 781.99       Patient Active Problem List   Diagnosis   • Essential hypertension   • Diabetes (HCC)   • GERD (gastroesophageal reflux disease)   • Insomnia   • Anxiety   • Mixed hyperlipidemia   • Precordial pain   • Abnormal EKG   • Left elbow pain   • Laceration of left elbow   • Cause of injury, fall   • Family history of GI malignancy   • Left upper quadrant pain        Past Medical History:   Diagnosis Date   • Anxiety    • Diabetes (HCC)    • Essential hypertension    • GERD (gastroesophageal reflux disease)    • Hyperlipidemia    • Insomnia    • Type 2 diabetes mellitus (HCC)         Past Surgical History:   Procedure Laterality Date   • COLECTOMY PARTIAL / TOTAL     • COLONOSCOPY N/A 2019    Procedure: COLONOSCOPY;  Surgeon: Cesar Santos MD;  Location: [facility] ENDOSCOPY;  Service: Gastroenterology   • ENDOSCOPY AND COLONOSCOPY  2012        PT Ortho     Row Name 22 1300       Precautions and Contraindications    Precautions/Limitations fall precautions  -AC          User Key  (r) = Recorded By, (t) = Taken By, (c) = Cosigned By    Initials Name Provider Type    AC Jane Pascual, PT Physical Therapist                             PT Assessment/Plan     Row Name 22 1400          PT Assessment    Assessment Comments treatment tolerated well today with focus on balance and ankle stability. ankle INV/ERV weakness with tband exercises today. Pt set to see OR for orthotics next week.  -AC            PT Plan    PT Frequency 1x/week;2x/week  -AC     Predicted Duration of Therapy Intervention (PT) 3-4  weeks then TBD  -AC     PT Plan Comments continue with POC  -AC           User Key  (r) = Recorded By, (t) = Taken By, (c) = Cosigned By    Initials Name Provider Type    AC Jane Pascual, PT Physical Therapist                   OP Exercises     Row Name 09/28/22 1400 09/28/22 1300          Subjective Comments    Subjective Comments -- Pt arrived at 1:45 for scheduled 1:00 appt so treatment completed by secondary PT, pt agreeable to seeing new PT this date. He notes doing well today, reports no pain at this time.  -AC            Subjective Pain    Able to rate subjective pain? -- yes  -AC     Pre-Treatment Pain Level -- 0  -AC            Total Minutes    91375 - PT Therapeutic Exercise Minutes 25  -AC --     05636 -  PT Neuromuscular Reeducation Minutes 16  -AC --            Exercise 1    Exercise Name 1 -- Pro II- L3.5  -AC     Time 1 -- 10 min  -AC            Exercise 2    Exercise Name 2 -- incline gastroc stretch  -AC     Sets 2 -- 3  -AC     Time 2 -- 30s  -AC            Exercise 3    Exercise Name 3 -- step ups on 6 inch step from airex  -AC     Sets 3 -- 1  -AC     Reps 3 -- 10  -AC            Exercise 4    Exercise Name 4 -- airex HR/TR  -AC     Sets 4 -- 1  -AC     Reps 4 -- 20  -AC            Exercise 5    Exercise Name 5 -- BAPS board  -AC     Sets 5 -- 20 each  -AC     Additional Comments -- PF/DF, INV/ERV, circles  -AC            Exercise 6    Exercise Name 6 -- ankle PF/DF with RTB  -AC     Sets 6 -- 1  -AC     Reps 6 -- 20  -AC            Exercise 7    Exercise Name 7 -- ankle INV/ERV with RTB  -AC     Sets 7 -- 2  -AC     Reps 7 -- 10  -AC            Exercise 8    Exercise Name 8 -- tandem walk fdw/back on foam beam  -AC     Sets 8 -- 5x each  -AC            Exercise 9    Exercise Name 9 -- side stepping on foam beam  -AC     Sets 9 -- 5x each direction  -AC     Additional Comments -- VCs to avoid supination  -AC           User Key  (r) = Recorded By, (t) = Taken By, (c) = Cosigned By     Initials Name Provider Type    AC Jane Pascual, PT Physical Therapist                              PT OP Goals     Row Name 09/28/22 1400          PT Short Term Goals    STG Date to Achieve 09/29/22  -AC     STG 1 Improve 5x's STS test to </=13 seconds  -AC     STG 1 Progress Ongoing  -AC     STG 2 Improve Kent balance test score to >/=52/56  -AC     STG 2 Progress Ongoing  -AC            Time Calculation    PT Goal Re-Cert Due Date 09/29/22  -           User Key  (r) = Recorded By, (t) = Taken By, (c) = Cosigned By    Initials Name Provider Type    AC Jane Pascual, PT Physical Therapist                               Time Calculation:   Start Time: 1352  Stop Time: 1433  Time Calculation (min): 41 min  Timed Charges  79437 - PT Therapeutic Exercise Minutes: 25  09684 -  PT Neuromuscular Reeducation Minutes: 16  Total Minutes  Timed Charges Total Minutes: 41   Total Minutes: 41  Therapy Charges for Today     Code Description Service Date Service Provider Modifiers Qty    71838578692 HC PT NEUROMUSC RE EDUCATION EA 15 MIN 9/28/2022 Jane Pascual, PT GP 1    12984986674 HC PT THER PROC EA 15 MIN 9/28/2022 Jane Pascual, PT GP 2                    Jane Pascual, PT  9/28/2022

## 2022-10-10 ENCOUNTER — HOSPITAL ENCOUNTER (OUTPATIENT)
Dept: PHYSICAL THERAPY | Facility: HOSPITAL | Age: 72
Setting detail: THERAPIES SERIES
Discharge: HOME | End: 2022-10-10

## 2022-10-10 DIAGNOSIS — R26.89 LOSS OF BALANCE: Primary | ICD-10-CM

## 2022-10-10 PROCEDURE — 97110 THERAPEUTIC EXERCISES: CPT

## 2022-10-10 PROCEDURE — 97112 NEUROMUSCULAR REEDUCATION: CPT

## 2022-10-10 NOTE — THERAPY PROGRESS REPORT/RE-CERT
"    Outpatient Physical Therapy Ortho Progress Note  [facility]     Patient Name: Hung Ramsay  : 1950  MRN: [id number]  Today's Date: 10/10/2022      Visit Date: 10/10/2022     ATTENDANCE:   SUBJECTIVE IMPROVEMENT: \"strength has gotten some better but balance still feels the same\"  NEXT MD APPOINTMENT: PRN  RECERT DATE: 10/31/22    THERAPY DIAGNOSIS: balance       Visit Dx:    ICD-10-CM ICD-9-CM   1. Loss of balance  R26.89 781.99       Patient Active Problem List   Diagnosis   • Essential hypertension   • Diabetes (HCC)   • GERD (gastroesophageal reflux disease)   • Insomnia   • Anxiety   • Mixed hyperlipidemia   • Precordial pain   • Abnormal EKG   • Left elbow pain   • Laceration of left elbow   • Cause of injury, fall   • Family history of GI malignancy   • Left upper quadrant pain        Past Medical History:   Diagnosis Date   • Anxiety    • Diabetes (HCC)    • Essential hypertension    • GERD (gastroesophageal reflux disease)    • Hyperlipidemia    • Insomnia    • Type 2 diabetes mellitus (HCC)         Past Surgical History:   Procedure Laterality Date   • COLECTOMY PARTIAL / TOTAL     • COLONOSCOPY N/A 2019    Procedure: COLONOSCOPY;  Surgeon: Cesar Santos MD;  Location: [facility] ENDOSCOPY;  Service: Gastroenterology   • ENDOSCOPY AND COLONOSCOPY  2012        PT Ortho     Row Name 10/10/22 1400       Subjective Comments    Subjective Comments Pt notes that lateral knee continues to have pain due to previous meniscus tear. ankle continues to feel unstable and like it will roll. Notes that since starting therapy he feels about the same, is doing HEP and notes exercises are getting easier. Did have a fall last week while trying to walk on curb after getting out of car to go towards house.  -AC       Precautions and Contraindications    Precautions/Limitations fall precautions  -AC       Subjective Pain    Able to rate subjective pain? yes  -AC    Pre-Treatment Pain Level --  " "\"knee always hurts\"  -       Posture/Observations    Posture/Observations Comments semi-fixed ankle supination/calcaneal inversion noted with standing posture. Diffiuclty with AROM ankle pronation in seated/standing.  -AC       General ROM    GENERAL ROM COMMENTS limited ankle INV/ERV bilaterally ~50% of normal range.  -AC       MMT (Manual Muscle Testing)    General MMT Comments grossly 5/5  -          User Key  (r) = Recorded By, (t) = Taken By, (c) = Cosigned By    Initials Name Provider Type     Jane Pascual, PT Physical Therapist                             PT Assessment/Plan     Row Name 10/10/22 1500          PT Assessment    Functional Limitations Decreased safety during functional activities  -     Impairments Balance;Endurance;Impaired flexibility;Muscle strength  -     Assessment Comments re-evaluation completed today/ 1/2 STGs met at this time with improved strength for sit to stand without UE assist in decreased time. 1 point increase in MONROE this date however continued diffiuclty with all SLS, tandem, and toe tapping criteria. added goals for tandem stance/walk and SLS for improved ankle stability and balance. as well as goal for functional mobility without falls for 2 weeks.  re-scheduled pt to see therapist for custom orthotic fitting next week. poor calcaneal inv/erv noted today however able to complete joint mobs with slight improvement in mobility. He remains appropriate for skilled PT to continue to progress towards goals and avoid falls/injury.  -     Rehab Potential Good  -     Patient/caregiver participated in establishment of treatment plan and goals Yes  -AC     Patient would benefit from skilled therapy intervention Yes  -AC        PT Plan    PT Frequency 2x/week  -     Predicted Duration of Therapy Intervention (PT) 3-4 more weeks  -     PT Plan Comments continue with balance and ankle mobility/strength. re-sched orthotic appt for next week due to pt missing last " "week.  -AC           User Key  (r) = Recorded By, (t) = Taken By, (c) = Cosigned By    Initials Name Provider Type    AC Jane Pascual, PT Physical Therapist                   OP Exercises     Row Name 10/10/22 1400             Subjective Comments    Subjective Comments Pt notes that lateral knee continues to have pain due to previous meniscus tear. ankle continues to feel unstable and like it will roll. Notes that since starting therapy he feels about the same, is doing HEP and notes exercises are getting easier. Did have a fall last week while trying to walk on curb after getting out of car to go towards house.  -AC         Subjective Pain    Able to rate subjective pain? yes  -AC      Pre-Treatment Pain Level --  \"knee always hurts\"  -AC         Total Minutes    82597 - PT Therapeutic Exercise Minutes 20  -AC      60702 -  PT Neuromuscular Reeducation Minutes 23  -AC         Exercise 1    Exercise Name 1 Pro II- L 4  -AC      Time 1 10 min  -AC         Exercise 2    Exercise Name 2 incline gastroc stretch  -AC      Sets 2 3  -AC      Time 2 30s  -AC         Exercise 3    Exercise Name 3 MONROE balance assessment  -AC      Additional Comments see outcomes  -AC         Exercise 4    Exercise Name 4 5TSTS  -AC      Additional Comments see outcomes  -AC         Exercise 5    Exercise Name 5 ankle mobility/strength  -AC      Additional Comments see ortho  -AC         Exercise 6    Exercise Name 6 tandem walk- fdw/back- firm ground, 1-2 fingers on rail for balance  -AC      Sets 6 6x each  -AC         Exercise 7    Exercise Name 7 lateral stepping  -AC      Sets 7 6x each direction  -AC      Additional Comments no UE assist, no LOB  -AC         Exercise 8    Exercise Name 8 toe taps on 6 inch step  -AC      Sets 8 1  -AC      Reps 8 20  -AC      Additional Comments no UE assist- SBA for safety  -AC         Exercise 9    Exercise Name 9 standing HR/TR  -AC      Sets 9 1  -AC      Reps 9 20  -AC         Exercise 10    " Exercise Name 10 553- firm ground- 1 finger on rail for balance  -AC      Reps 10 5x each leg  -AC      Additional Comments SBA for safety with balance  -AC         Exercise 11    Exercise Name 11 attempted seated ankle pronation  -AC      Time 11 3 min  -AC      Additional Comments very diffiuclty  -AC         Exercise 12    Exercise Name 12 calcaneal ankle inv/erv PROM  -AC      Time 12 2 min  -AC      Additional Comments improvement in PROM pre/post visible but still diffiuclty AROM.  -            User Key  (r) = Recorded By, (t) = Taken By, (c) = Cosigned By    Initials Name Provider Type    AC Jane Pascual, PT Physical Therapist                              PT OP Goals     Row Name 10/10/22 1400          PT Short Term Goals    STG Date to Achieve 09/29/22  -     STG 1 Improve 5x's STS test to </=13 seconds  -     STG 1 Progress Met  -     STG 2 Improve Kent balance test score to >/=52/56  -     STG 2 Progress Not Met  -AC     STG 2 Progress Comments 50/56, 1 point improvement.  -        Long Term Goals    LTG Date to Achieve 11/07/22  -     LTG 1 Pt is able to obtain tandem stance without UE support bilaterally without LOB.  -AC     LTG 1 Progress New  -     LTG 2 Pt is able to complete SLS on BLes for 4s.  -     LTG 2 Progress New  -     LTG 3 Pt is able to tandem walk without UE support x 5 steps.  -     LTG 3 Progress New  -     LTG 4 Pt reports no falls in > 2 weeks for improved safety with functional mobility.  -     LTG 4 Progress New  -        Time Calculation    PT Goal Re-Cert Due Date 10/31/22  -           User Key  (r) = Recorded By, (t) = Taken By, (c) = Cosigned By    Initials Name Provider Type    AC Jane Pascual, PT Physical Therapist                     Outcome Measure Options: 5x Sit to Stand, Kent Balance  5 Times Sit to Stand  5 Times Sit to Stand (seconds): 12.27 seconds  5 Times Sit to Stand Comments: no UE assist.  Kent Balance Scale  Sitting to  Standing: able to stand without using hands and stabilize independently  Standing Unsupported: able to stand safely for 2 minutes  Sitting with Back Unsupported but Feet Supported on Floor or on Stool: able to sit safely and securely for 2 minutes  Standing to Sitting: sits safely with minimal use of hands  Transfers: able to transfer safely with minor use of hands  Standing Unsupported with Eyes Closed: able to stand 10 seconds safely  Standing Unsupported with Feet Together: able to place feet together independently and stand 1 minute safely  Reaching Forward with Outstretched Arm While Standing: can reach forward confidently 25 cm (10 inches)   Object From the Floor From a Standing Position: able to  object safely and easily  Turning to Look Behind Over Left and Right Shoulders While Standing: looks behind from both sides and weight shifts well  Turn 360 Degrees: able to turn 360 degrees safely in 4 seconds or less  Place Alternate Foot on Step or Stool While Standing Unsupported: able to complete 4 steps without aid with supervision  Standing Unsupported with One Foot in Front: able to place foot ahead independently and hold 30 seconds  Standing on One Leg: tries to lift leg unable to hold 3 seconds but remains standing independently  Kent Total Score: 50         Time Calculation:   Start Time: 1430  Stop Time: 1513  Time Calculation (min): 43 min  Timed Charges  54056 - PT Therapeutic Exercise Minutes: 20  93303 -  PT Neuromuscular Reeducation Minutes: 23  Total Minutes  Timed Charges Total Minutes: 43   Total Minutes: 43  Therapy Charges for Today     Code Description Service Date Service Provider Modifiers Qty    67577051399 HC PT NEUROMUSC RE EDUCATION EA 15 MIN 10/10/2022 Jane Pascual, PT GP 2    82007286163 HC PT THER PROC EA 15 MIN 10/10/2022 Jane Pascual, PT GP 1                   Jane Pascual, PT  10/10/2022

## 2022-10-17 ENCOUNTER — HOSPITAL ENCOUNTER (OUTPATIENT)
Dept: PHYSICAL THERAPY | Facility: HOSPITAL | Age: 72
Setting detail: THERAPIES SERIES
Discharge: HOME | End: 2022-10-17

## 2022-10-17 DIAGNOSIS — R26.89 LOSS OF BALANCE: Primary | ICD-10-CM

## 2022-10-17 PROCEDURE — 97110 THERAPEUTIC EXERCISES: CPT | Performed by: PHYSICAL THERAPIST

## 2022-10-17 PROCEDURE — 97112 NEUROMUSCULAR REEDUCATION: CPT | Performed by: PHYSICAL THERAPIST

## 2022-10-17 NOTE — THERAPY TREATMENT NOTE
"    Outpatient Physical Therapy Ortho Treatment Note  [facility]     Patient Name: Hung Ramsay  : 1950  MRN: [id number]  Today's Date: 10/17/2022      Visit Date: 10/17/2022  ATTENDANCE:   SUBJECTIVE IMPROVEMENT: \"strength has gotten some better but balance still feels the same\"  NEXT MD APPOINTMENT: PRN  RECERT DATE: 10/31/22     THERAPY DIAGNOSIS: balance     Visit Dx:    ICD-10-CM ICD-9-CM   1. Loss of balance  R26.89 781.99       Patient Active Problem List   Diagnosis   • Essential hypertension   • Diabetes (HCC)   • GERD (gastroesophageal reflux disease)   • Insomnia   • Anxiety   • Mixed hyperlipidemia   • Precordial pain   • Abnormal EKG   • Left elbow pain   • Laceration of left elbow   • Cause of injury, fall   • Family history of GI malignancy   • Left upper quadrant pain        Past Medical History:   Diagnosis Date   • Anxiety    • Diabetes (HCC)    • Essential hypertension    • GERD (gastroesophageal reflux disease)    • Hyperlipidemia    • Insomnia    • Type 2 diabetes mellitus (HCC)         Past Surgical History:   Procedure Laterality Date   • COLECTOMY PARTIAL / TOTAL     • COLONOSCOPY N/A 2019    Procedure: COLONOSCOPY;  Surgeon: Cesar Santos MD;  Location: [facility] ENDOSCOPY;  Service: Gastroenterology   • ENDOSCOPY AND COLONOSCOPY  2012        PT Ortho     Row Name 10/17/22 1433       Precautions and Contraindications    Precautions/Limitations fall precautions  -BS       Subjective Pain    Able to rate subjective pain? yes  -BS    Pre-Treatment Pain Level 3  L lateral gastroc region  -BS    Post-Treatment Pain Level 3  L calf  -BS       MMT (Manual Muscle Testing)    General MMT Comments L ankle benja 4/5 inv 5  -BS          User Key  (r) = Recorded By, (t) = Taken By, (c) = Cosigned By    Initials Name Provider Type    BS Emery Jenkins, PT Physical Therapist                             PT Assessment/Plan     Row Name 10/17/22 1430          PT Assessment    " Assessment Comments Balance deficit influenced in part by faulty L ankle/foot mechanics, postured in excessive supination. Liimted by L ankle eversion weakness, issued green TB for resisted benja for home use.  -BS        PT Plan    PT Frequency 2x/week  -BS     Predicted Duration of Therapy Intervention (PT) 3-4 weeks  -BS     PT Plan Comments to be filled for custom orthotics with next session.  -BS           User Key  (r) = Recorded By, (t) = Taken By, (c) = Cosigned By    Initials Name Provider Type    Emery Shanks, PT Physical Therapist                   OP Exercises     Row Name 10/17/22 5112             Subjective Comments    Subjective Comments Reports most of his pain is in the left calf region and not the L knee at this point. Worst with extensive standing  -BS         Subjective Pain    Able to rate subjective pain? yes  -BS      Pre-Treatment Pain Level 3  L lateral gastroc region  -BS      Post-Treatment Pain Level 3  L calf  -BS         Exercise 1    Exercise Name 1 Pro II- L 4  -BS      Time 1 10 min  -BS         Exercise 2    Exercise Name 2 incline gastroc stretch  -BS      Sets 2 3  -BS      Time 2 30s  -BS         Exercise 3    Exercise Name 3 resisted benja w/ green TB  -BS      Sets 3 1  -BS      Reps 3 20  -BS      Additional Comments seated  -BS         Exercise 4    Exercise Name 4 sidestepping in // bars  -BS      Time 4 4 laps  -BS         Exercise 5    Exercise Name 5 tandem gait in // bars  -BS      Time 5 4 laps  -BS      Additional Comments intermittent B UE A of // bars  -BS         Exercise 6    Exercise Name 6 SLS in // bars  -BS      Reps 6 2 sec on R  -BS      Time 6 1-2 sec on L  -BS         Exercise 7    Exercise Name 7 Sharpened Romberg stance on level surface  -BS      Reps 7 10 sec, R LE leading  -BS      Time 7 20 phillip L LE leading  -BS         Exercise 8    Exercise Name 8 partial tandem standing on Airex, each LE leading  -BS      Time 8 2 min total  -BS         Exercise 9  "   Exercise Name 9 standing HR/TR on Airex  -BS      Sets 9 1  -BS      Reps 9 10 ea  -BS         Exercise 10    Exercise Name 10 sit to stands, no UE A  -BS      Sets 10 1  -BS      Reps 10 10 reps  -BS         Exercise 11    Exercise Name 11 lateral step ups, 6\"  -BS      Sets 11 1  -BS      Reps 11 10  -BS         Exercise 12    Exercise Name 12 bwd step ups, 6\"  -BS      Reps 12 1  -BS      Time 12 10 ea  -BS         Exercise 13    Exercise Name 13 Romberg stance on Airex with eo/ec  -BS      Time 13 30 sec eo  -BS      Additional Comments 20 sec ec  -BS            User Key  (r) = Recorded By, (t) = Taken By, (c) = Cosigned By    Initials Name Provider Type    Emery Shanks, PT Physical Therapist                              PT OP Goals     Row Name 10/17/22 1430 10/17/22 1400       PT Short Term Goals    STG Date to Achieve 09/29/22  -BS --    STG 1 Improve 5x's STS test to </=13 seconds  -BS --    STG 1 Progress Met  -BS --    STG 2 Improve Kent balance test score to >/=52/56  -BS --    STG 2 Progress Not Met  -BS --       Long Term Goals    LTG Date to Achieve 11/07/22  -BS --    LTG 1 Pt is able to obtain tandem stance without UE support bilaterally without LOB.  -BS --    LTG 1 Progress New  -BS --    LTG 2 Pt is able to complete SLS on BLes for 4s.  -BS --    LTG 2 Progress New  -BS --    LTG 3 Pt is able to tandem walk without UE support x 5 steps.  -BS --    LTG 3 Progress New  -BS --    LTG 4 Pt reports no falls in > 2 weeks for improved safety with functional mobility.  -BS --    LTG 4 Progress New  -BS --       Time Calculation    PT Goal Re-Cert Due Date 10/31/22  -BS --  -BS          User Key  (r) = Recorded By, (t) = Taken By, (c) = Cosigned By    Initials Name Provider Type    Emery Shanks, PT Physical Therapist                               Time Calculation:   Start Time: 1430  Stop Time: 1514  Time Calculation (min): 44 min  Total Timed Code Minutes- PT: 44 minute(s)  Therapy Charges " for Today     Code Description Service Date Service Provider Modifiers Qty    25586536525 HC PT THER PROC EA 15 MIN 10/17/2022 Emery Jenkins, PT GP 1    84137529408 HC PT NEUROMUSC RE EDUCATION EA 15 MIN 10/17/2022 Emery Jenkins PT GP 2                    Emery Jenkins, PT  10/17/2022

## 2022-10-19 ENCOUNTER — HOSPITAL ENCOUNTER (OUTPATIENT)
Dept: PHYSICAL THERAPY | Facility: HOSPITAL | Age: 72
Setting detail: THERAPIES SERIES
Discharge: HOME | End: 2022-10-19

## 2022-10-19 DIAGNOSIS — R26.89 LOSS OF BALANCE: Primary | ICD-10-CM

## 2022-10-19 PROCEDURE — 97110 THERAPEUTIC EXERCISES: CPT | Performed by: PHYSICAL THERAPIST

## 2022-10-19 PROCEDURE — 97535 SELF CARE MNGMENT TRAINING: CPT | Performed by: PHYSICAL THERAPIST

## 2022-10-19 NOTE — THERAPY TREATMENT NOTE
Outpatient Physical Therapy Ortho Treatment Note  [facility]     Patient Name: Hung Ramsay  : 1950  MRN: [id number]  Today's Date: 10/20/2022      Visit Date: 10/19/2022    Visit Dx:    ICD-10-CM ICD-9-CM   1. Loss of balance  R26.89 781.99       Patient Active Problem List   Diagnosis   • Essential hypertension   • Diabetes (HCC)   • GERD (gastroesophageal reflux disease)   • Insomnia   • Anxiety   • Mixed hyperlipidemia   • Precordial pain   • Abnormal EKG   • Left elbow pain   • Laceration of left elbow   • Cause of injury, fall   • Family history of GI malignancy   • Left upper quadrant pain        Past Medical History:   Diagnosis Date   • Anxiety    • Diabetes (HCC)    • Essential hypertension    • GERD (gastroesophageal reflux disease)    • Hyperlipidemia    • Insomnia    • Type 2 diabetes mellitus (HCC)         Past Surgical History:   Procedure Laterality Date   • COLECTOMY PARTIAL / TOTAL     • COLONOSCOPY N/A 2019    Procedure: COLONOSCOPY;  Surgeon: Cesar Santos MD;  Location: [facility] ENDOSCOPY;  Service: Gastroenterology   • ENDOSCOPY AND COLONOSCOPY  2012        PT Ortho     Row Name 10/19/22 1434       Precautions and Contraindications    Precautions/Limitations fall precautions  -BB       Subjective Pain    Able to rate subjective pain? yes  -BB    Subjective Pain Comment pain in stance and gait  -BB       Posture/Observations    Posture/Observations Comments severe ankle collapse L>R in stance. calcaneal instability and poor lateral ankle stability. Leukotaped this date with verbal improvements  -BB          User Key  (r) = Recorded By, (t) = Taken By, (c) = Cosigned By    Initials Name Provider Type    Lora Marks, PT DPT Physical Therapist                             PT Assessment/Plan     Row Name 10/19/22 1434          PT Assessment    Assessment Comments 3/4 (per patient request) length pro shocker with lateral wedge and deep heel cup for motion  "control and improved weightbearing. discussed rigid brace options for patient to assist ankle support and high top shoes. Educated on sitting and standing sup/pron control of ankles to build endurance and strength.  -BB        PT Plan    PT Frequency 2x/week  -BB     Predicted Duration of Therapy Intervention (PT) 3-4 weeks  -BB     PT Plan Comments progress dynamic lateral ankle stability  -BB           User Key  (r) = Recorded By, (t) = Taken By, (c) = Cosigned By    Initials Name Provider Type    Lora Marks PT DPT Physical Therapist                   OP Exercises     Row Name 10/19/22 1434             Subjective Comments    Subjective Comments Pain remains in ankles. L>R pain. Golfs daily.  -BB         Subjective Pain    Able to rate subjective pain? yes  -BB      Subjective Pain Comment pain in stance and gait  -BB         Exercise 1    Exercise Name 1 orthotic scan/POC/wear/care  -BB         Exercise 2    Exercise Name 2 Ktape to decrease ankle collapse  -BB      Additional Comments given heel cups for cushion  -BB         Exercise 3    Exercise Name 3 standing sup/pron- movement of subtalar jt  -BB      Sets 3 1  -BB      Reps 3 10  -BB      Time 3 5\" holds  -BB         Exercise 4    Exercise Name 4 sitting sup/pron of subtalar jt  -BB      Sets 4 1  -BB      Reps 4 10  -BB      Time 4 5\" holds  -BB         Exercise 5    Exercise Name 5 passive ankle pronation mobes with calcaneal block bilaterally  -BB      Sets 5 1  -BB      Reps 5 10 each side  -BB      Time 5 3\" oscillations  -BB         Exercise 6    Exercise Name 6 discussed ankle bracing options for more rigid support  -BB      Additional Comments encouraged high top shoes/boots for ankle support  -BB            User Key  (r) = Recorded By, (t) = Taken By, (c) = Cosigned By    Initials Name Provider Type    Lora Marks PT DPT Physical Therapist                              PT OP Goals     Row Name 10/19/22 1436          PT Short Term " Goals    STG Date to Achieve 09/29/22  -BB     STG 1 Improve 5x's STS test to </=13 seconds  -BB     STG 1 Progress Met  -BB     STG 2 Improve Kent balance test score to >/=52/56  -BB     STG 2 Progress Not Met  -BB        Long Term Goals    LTG Date to Achieve 11/07/22  -BB     LTG 1 Pt is able to obtain tandem stance without UE support bilaterally without LOB.  -BB     LTG 1 Progress New  -BB     LTG 2 Pt is able to complete SLS on BLes for 4s.  -BB     LTG 2 Progress New  -BB     LTG 3 Pt is able to tandem walk without UE support x 5 steps.  -BB     LTG 3 Progress New  -BB     LTG 4 Pt reports no falls in > 2 weeks for improved safety with functional mobility.  -BB     LTG 4 Progress New  -BB        Time Calculation    PT Goal Re-Cert Due Date 10/31/22  -BB           User Key  (r) = Recorded By, (t) = Taken By, (c) = Cosigned By    Initials Name Provider Type    Lora Marks, PT DPT Physical Therapist                               Time Calculation:   Start Time: 1434  Stop Time: 1525  Time Calculation (min): 51 min  Therapy Charges for Today     Code Description Service Date Service Provider Modifiers Qty    48949393968 HC PT-CUSTOM ORTHOTICS-LEVEL 1 10/19/2022 Lora Manuel, PT DPT  1    76673543060 HC PT THER PROC EA 15 MIN 10/19/2022 Lora Manuel PT DPT GP 1    41498127288 HC PT SELF CARE/MGMT/TRAIN EA 15 MIN 10/19/2022 Lora Manuel PT DPT GP 1                    Lora Manuel PT DPT  10/20/2022

## 2022-10-25 ENCOUNTER — APPOINTMENT (OUTPATIENT)
Dept: PHYSICAL THERAPY | Facility: HOSPITAL | Age: 72
End: 2022-10-25

## 2022-10-31 DIAGNOSIS — E11.9 TYPE 2 DIABETES MELLITUS WITHOUT COMPLICATION, WITHOUT LONG-TERM CURRENT USE OF INSULIN: ICD-10-CM

## 2022-10-31 DIAGNOSIS — F41.9 ANXIETY: ICD-10-CM

## 2022-10-31 DIAGNOSIS — F33.0 MILD EPISODE OF RECURRENT MAJOR DEPRESSIVE DISORDER: ICD-10-CM

## 2022-10-31 DIAGNOSIS — G47.00 INSOMNIA, UNSPECIFIED TYPE: ICD-10-CM

## 2022-10-31 RX ORDER — PAROXETINE HYDROCHLORIDE HEMIHYDRATE 37.5 MG/1
TABLET, FILM COATED, EXTENDED RELEASE ORAL
Qty: 90 TABLET | Refills: 3 | Status: SHIPPED | OUTPATIENT
Start: 2022-10-31

## 2022-10-31 RX ORDER — TRAZODONE HYDROCHLORIDE 50 MG/1
TABLET ORAL
Qty: 90 TABLET | Refills: 1 | Status: SHIPPED | OUTPATIENT
Start: 2022-10-31 | End: 2023-03-06

## 2022-11-03 ENCOUNTER — APPOINTMENT (OUTPATIENT)
Dept: PHYSICAL THERAPY | Facility: HOSPITAL | Age: 72
End: 2022-11-03

## 2022-11-16 DIAGNOSIS — E78.5 HYPERLIPIDEMIA, UNSPECIFIED HYPERLIPIDEMIA TYPE: ICD-10-CM

## 2022-11-16 RX ORDER — PRAVASTATIN SODIUM 40 MG
TABLET ORAL
Qty: 90 TABLET | Refills: 2 | Status: SHIPPED | OUTPATIENT
Start: 2022-11-16

## 2022-11-25 DIAGNOSIS — R25.1 TREMOR: ICD-10-CM

## 2022-11-28 DIAGNOSIS — E11.9 TYPE 2 DIABETES MELLITUS WITHOUT COMPLICATION, WITHOUT LONG-TERM CURRENT USE OF INSULIN: ICD-10-CM

## 2022-11-28 DIAGNOSIS — I10 ESSENTIAL HYPERTENSION: ICD-10-CM

## 2022-11-28 RX ORDER — LOSARTAN POTASSIUM AND HYDROCHLOROTHIAZIDE 25; 100 MG/1; MG/1
TABLET ORAL
Qty: 90 TABLET | Refills: 0 | Status: SHIPPED | OUTPATIENT
Start: 2022-11-28 | End: 2023-03-06

## 2022-11-28 RX ORDER — GLIPIZIDE 5 MG/1
TABLET ORAL
Qty: 180 TABLET | Refills: 3 | Status: SHIPPED | OUTPATIENT
Start: 2022-11-28

## 2022-11-28 RX ORDER — CARBIDOPA 25 MG/1
TABLET ORAL
Qty: 270 TABLET | Refills: 1 | Status: SHIPPED | OUTPATIENT
Start: 2022-11-28

## 2022-11-28 RX ORDER — ESOMEPRAZOLE MAGNESIUM 40 MG/1
CAPSULE, DELAYED RELEASE ORAL
Qty: 90 CAPSULE | Refills: 1 | Status: SHIPPED | OUTPATIENT
Start: 2022-11-28

## 2023-03-06 DIAGNOSIS — F41.9 ANXIETY: ICD-10-CM

## 2023-03-06 DIAGNOSIS — I10 ESSENTIAL HYPERTENSION: ICD-10-CM

## 2023-03-06 DIAGNOSIS — G47.00 INSOMNIA, UNSPECIFIED TYPE: ICD-10-CM

## 2023-03-06 RX ORDER — TRAZODONE HYDROCHLORIDE 50 MG/1
TABLET ORAL
Qty: 90 TABLET | Refills: 1 | Status: SHIPPED | OUTPATIENT
Start: 2023-03-06

## 2023-03-06 RX ORDER — LOSARTAN POTASSIUM AND HYDROCHLOROTHIAZIDE 25; 100 MG/1; MG/1
TABLET ORAL
Qty: 90 TABLET | Refills: 0 | Status: SHIPPED | OUTPATIENT
Start: 2023-03-06

## 2023-04-20 ENCOUNTER — OFFICE VISIT (OUTPATIENT)
Dept: FAMILY MEDICINE CLINIC | Facility: CLINIC | Age: 73
End: 2023-04-20
Payer: MEDICARE

## 2023-04-20 VITALS
HEIGHT: 68 IN | SYSTOLIC BLOOD PRESSURE: 122 MMHG | DIASTOLIC BLOOD PRESSURE: 72 MMHG | HEART RATE: 83 BPM | OXYGEN SATURATION: 100 % | BODY MASS INDEX: 31.98 KG/M2 | WEIGHT: 211 LBS

## 2023-04-20 DIAGNOSIS — M25.562 PAIN AND SWELLING OF LEFT KNEE: Primary | ICD-10-CM

## 2023-04-20 DIAGNOSIS — M25.462 PAIN AND SWELLING OF LEFT KNEE: Primary | ICD-10-CM

## 2023-04-20 PROCEDURE — 3078F DIAST BP <80 MM HG: CPT | Performed by: NURSE PRACTITIONER

## 2023-04-20 PROCEDURE — 1160F RVW MEDS BY RX/DR IN RCRD: CPT | Performed by: NURSE PRACTITIONER

## 2023-04-20 PROCEDURE — 3074F SYST BP LT 130 MM HG: CPT | Performed by: NURSE PRACTITIONER

## 2023-04-20 PROCEDURE — 1159F MED LIST DOCD IN RCRD: CPT | Performed by: NURSE PRACTITIONER

## 2023-04-20 PROCEDURE — 99214 OFFICE O/P EST MOD 30 MIN: CPT | Performed by: NURSE PRACTITIONER

## 2023-04-20 RX ORDER — MELOXICAM 15 MG/1
15 TABLET ORAL DAILY
Qty: 30 TABLET | Refills: 0 | Status: SHIPPED | OUTPATIENT
Start: 2023-04-20

## 2023-04-20 NOTE — PROGRESS NOTES
Chief Complaint  Knee Pain (Left knee pain and swelling post fall )    Subjective          Hung Ramsay presents to [facility] [place] with left knee pain and swelling that occurred 2 weeks ago after falling while playing golf. Swelling is not improving    Knee Pain   The incident occurred more than 1 week ago. The injury mechanism was a fall. The pain is present in the left knee. The quality of the pain is described as aching. The pain is at a severity of 5/10. The pain is moderate. The pain has been fluctuating since onset. Pertinent negatives include no numbness or tingling. The symptoms are aggravated by movement and weight bearing. He has tried non-weight bearing, rest, ice and heat for the symptoms. The treatment provided no relief.     Outpatient Medications Prior to Visit   Medication Sig Dispense Refill   • aspirin 81 MG EC tablet Take 1 tablet by mouth Daily.     • carbidopa (LODOSYN) 25 MG tablet TAKE 1 TABLET BY MOUTH THREE TIMES A  tablet 1   • diclofenac (VOLTAREN) 75 MG EC tablet Take 1 tablet by mouth 2 (Two) Times a Day As Needed (pain). 180 tablet 2   • esomeprazole (nexIUM) 40 MG capsule TAKE 1 CAPSULE BY MOUTH EVERY DAY IN THE MORNING BEFORE BREAKFAST 90 capsule 1   • glipizide (GLUCOTROL) 5 MG tablet TAKE 1 TABLET BY MOUTH 2 TIMES A DAY BEFORE MEALS. 180 tablet 3   • glucose blood test strip Use as instructed 100 each 3   • glucose monitor monitoring kit 1 each Daily. 1 each 0   • hydrALAZINE (APRESOLINE) 25 MG tablet Take 1 tablet by mouth 2 (Two) Times a Day. 180 tablet 1   • losartan-hydrochlorothiazide (HYZAAR) 100-25 MG per tablet TAKE 1 TABLET BY MOUTH EVERY DAY 90 tablet 0   • metFORMIN (GLUCOPHAGE) 1000 MG tablet TAKE 1 TABLET BY MOUTH TWICE A DAY WITH MEALS 180 tablet 1   • nystatin (MYCOSTATIN) 377256 UNIT/GM cream Apply 1 application topically to the appropriate area as directed 2 (Two) Times a Day. 30 g 1   • PARoxetine CR  "(PAXIL-CR) 37.5 MG 24 hr tablet TAKE 1 TABLET BY MOUTH EVERY DAY IN THE MORNING 90 tablet 3   • pravastatin (PRAVACHOL) 40 MG tablet TAKE 1 TABLET BY MOUTH EVERYDAY AT BEDTIME 90 tablet 2   • sildenafil (REVATIO) 20 MG tablet USE 1 TO 5 TABLETS AS NEEDED FOR ERECTILE DYSFUNCTION. DO NOT EXCEED 100MG IN 24 HOURS. 90 tablet 1   • traZODone (DESYREL) 50 MG tablet TAKE 1 TABLET BY MOUTH EVERY DAY AT NIGHT 90 tablet 1   • Vascepa 1 g capsule capsule TAKE 2 CAPSULES BY MOUTH 2 TIMES A DAY WITH MEALS. 120 capsule 2     No facility-administered medications prior to visit.       Review of Systems   Neurological: Negative for tingling and numbness.         Objective   Vital Signs:   Visit Vitals  /72 (BP Location: Left arm, Patient Position: Sitting, Cuff Size: Adult)   Pulse 83   Ht 172.7 cm (67.99\")   Wt 95.7 kg (211 lb)   SpO2 100%   BMI 32.09 kg/m²     Physical Exam  Vitals and nursing note reviewed.   Constitutional:       Appearance: Normal appearance. He is well-developed.   HENT:      Head: Normocephalic and atraumatic.      Right Ear: Hearing normal.      Left Ear: Hearing normal.      Nose: Nose normal. No mucosal edema or rhinorrhea.   Eyes:      General: Lids are normal.      Conjunctiva/sclera: Conjunctivae normal.      Pupils: Pupils are equal, round, and reactive to light.   Neck:      Thyroid: No thyroid mass or thyromegaly.      Trachea: Trachea normal. No tracheal tenderness or tracheal deviation.   Cardiovascular:      Rate and Rhythm: Normal rate.      Pulses: Normal pulses.      Heart sounds: Normal heart sounds.   Pulmonary:      Effort: Pulmonary effort is normal. No respiratory distress.      Breath sounds: Normal breath sounds. No stridor. No wheezing or rales.   Abdominal:      Palpations: Abdomen is soft.   Musculoskeletal:      Cervical back: Normal range of motion.      Left knee: Swelling and effusion present. Decreased range of motion. Tenderness present.        Legs:    Skin:     General: " Skin is warm and dry.   Neurological:      Mental Status: He is alert and oriented to person, place, and time.   Psychiatric:         Mood and Affect: Mood is not anxious. Affect is not inappropriate.         Speech: Speech normal.         Behavior: Behavior normal. Behavior is not agitated or hyperactive.         Thought Content: Thought content normal.         Judgment: Judgment normal.        Result Review :                 Assessment and Plan    Diagnoses and all orders for this visit:    1. Pain and swelling of left knee (Primary)  -     XR Knee 3 View Left  -     meloxicam (Mobic) 15 MG tablet; Take 1 tablet by mouth Daily.  Dispense: 30 tablet; Refill: 0  -     Ambulatory Referral to Orthopedic Surgery      Complete ordered xray   We will call with results    Start newly prescribed medications   Please call the office if you have issues         Continue to elevate, wrap and ice     Referral to ortho placed     Follow Up   Return if symptoms worsen or fail to improve, for Next scheduled follow up.  Patient was given instructions and counseling regarding his condition or for health maintenance advice. Please see specific information pulled into the AVS if appropriate.           This document has been electronically signed by GEMINI Ferreira on April 20, 2023 14:38 CDT

## 2023-04-28 ENCOUNTER — OFFICE VISIT (OUTPATIENT)
Dept: ORTHOPEDIC SURGERY | Facility: CLINIC | Age: 73
End: 2023-04-28
Payer: MEDICARE

## 2023-04-28 VITALS — HEIGHT: 68 IN | WEIGHT: 219.9 LBS | BODY MASS INDEX: 33.33 KG/M2

## 2023-04-28 DIAGNOSIS — M23.204 DEGENERATIVE TEAR OF LEFT MEDIAL MENISCUS: ICD-10-CM

## 2023-04-28 DIAGNOSIS — G89.29 CHRONIC PAIN OF LEFT KNEE: Primary | ICD-10-CM

## 2023-04-28 DIAGNOSIS — S80.02XA HEMATOMA OF LEFT KNEE REGION: ICD-10-CM
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
## 2023-04-28 DIAGNOSIS — M25.562 CHRONIC PAIN OF LEFT KNEE: Primary | ICD-10-CM

## 2023-04-28 DIAGNOSIS — M25.462 EFFUSION OF LEFT KNEE: ICD-10-CM

## 2023-04-28 DIAGNOSIS — M17.12 PRIMARY OSTEOARTHRITIS OF LEFT KNEE: ICD-10-CM

## 2023-04-28 RX ADMIN — TRIAMCINOLONE ACETONIDE 40 MG: 40 INJECTION, SUSPENSION INTRA-ARTICULAR; INTRAMUSCULAR at 10:38

## 2023-04-28 RX ADMIN — LIDOCAINE HYDROCHLORIDE 2 ML: 10 INJECTION, SOLUTION INFILTRATION; PERINEURAL at 10:38

## 2023-04-28 NOTE — PROGRESS NOTES
"Hung Ramsay is a 72 y.o. male returns for     Chief Complaint   Patient presents with   • Left Knee - Edema, Pain, Follow-up     HISTORY OF PRESENT ILLNESS: Patient presents to office for follow-up of chronic left knee pain.  The patient has not been seen in our office for left knee pain since 2/16/2021.  The patient was previously followed per Dr. Mercado and treated conservatively for chronic left knee pain with previous intra-articular injections of steroid and prescription oral NSAIDs.  Since that time, the patient has also been under the care of Dr. Fadi Servin out of [place].  Those notes are unavailable for me to review but the patient is noted to of had MRI imaging of his left knee as ordered by that physician and this was performed on 5/26/2021, which showed moderate osteoarthritic changes as well as a degenerative tear of the medial meniscus.  Patient has experienced a recent acute exacerbation of left knee pain/swelling and possible injury.  Patient reports that he sustained a fall while golfing approximately 3 weeks ago and has experienced acute left knee pain/swelling since that time.  Patient was recently evaluated by his primary care provider, GEMINI Ferreira on 4/20/2023 with x-rays performed of the left knee.  Current pain scale is 2/10.      CONCURRENT MEDICAL HISTORY:    The following portions of the patient's history were reviewed and updated as appropriate: allergies, current medications, past family history, past medical history, past social history, past surgical history and problem list.     ROS  No fevers or chills.  No chest pain or shortness of air.  No GI or  disturbances. Left knee pain/swelling.     PHYSICAL EXAMINATION:       Ht 172.7 cm (67.99\")   Wt 99.7 kg (219 lb 14.4 oz)   BMI 33.45 kg/m²     Physical Exam  Vitals reviewed.   Constitutional:       General: He is not in acute distress.     Appearance: He is well-developed. He is not ill-appearing.   HENT:      " Head: Normocephalic.   Pulmonary:      Effort: Pulmonary effort is normal. No respiratory distress.   Abdominal:      General: There is no distension.      Palpations: Abdomen is soft.   Musculoskeletal:         General: Swelling (Left knee), tenderness (Left knee) and signs of injury (Left knee/leg) present. No deformity.      Left knee: Effusion present.      Instability Tests: Medial Carlyn test positive. Lateral Carlyn test negative.   Skin:     General: Skin is warm and dry.      Capillary Refill: Capillary refill takes less than 2 seconds.      Findings: Bruising (Left knee/thigh) present. No erythema.   Neurological:      Mental Status: He is alert and oriented to person, place, and time.      GCS: GCS eye subscore is 4. GCS verbal subscore is 5. GCS motor subscore is 6.   Psychiatric:         Speech: Speech normal.         Behavior: Behavior normal.         Thought Content: Thought content normal.         Judgment: Judgment normal.         GAIT:     []  Normal  [x]  Antalgic    Assistive device: [x]  None  []  Walker     []  Crutches  []  Cane     []  Wheelchair  []  Stretcher    Left Knee Exam     Tenderness   Left knee tenderness location: Mild, diffuse.    Range of Motion   Extension: 5   Flexion: 100     Tests   Carlyn:  Medial - positive Lateral - negative  Varus: negative Valgus: negative    Other   Erythema: absent  Sensation: normal  Pulse: present  Swelling: moderate  Effusion: effusion present    Comments:  Pain and limitations with range of motion.  Suprapatellar fullness noted consistent with joint effusion.  However, when lying flat, there is a localized, moderate sized collection of fluid with some dark erythema/purple discoloration in the lateral aspect of the left knee/leg consistent with hematoma.  Additional, extensive ecchymosis is noted in the medial aspect of the left thigh.  Overall acceptable motion of the knee joint.  Skin is intact.  No deformity.  No signs of infection  noted.            XR Knee 3 View Left    Result Date: 4/20/2023  Narrative: HISTORY: Left knee swelling. COMPARISON: 8/27/2020 FINDINGS: AP, lateral and sunrise views of the left knee demonstrate mild tricompartmental osteoarthritis.  There is no fracture or dislocation.     Impression: Mild tricompartmental osteoarthritis.        ASSESSMENT:    Diagnoses and all orders for this visit:    Chronic pain of left knee  -     Large Joint Arthrocentesis: L knee    Injury due to fall, initial encounter    Effusion of left knee  -     Large Joint Arthrocentesis: L knee    Primary osteoarthritis of left knee  -     Large Joint Arthrocentesis: L knee    Degenerative tear of left medial meniscus  -     Large Joint Arthrocentesis: L knee    Hematoma of left knee region      Large Joint Arthrocentesis: L knee  Date/Time: 4/28/2023 10:38 AM  Consent given by: patient  Timeout: Immediately prior to procedure a time out was called to verify the correct patient, procedure, equipment, support staff and site/side marked as required   Supporting Documentation  Indications: pain, joint swelling and diagnostic evaluation   Procedure Details  Location: knee - L knee  Preparation: Patient was prepped and draped in the usual sterile fashion  Needle size: 18 G  Approach: lateral  Medications administered: 40 mg triamcinolone acetonide 40 MG/ML; 2 mL lidocaine 1 %  Aspirate amount: 0 mL  Patient tolerance: patient tolerated the procedure well with no immediate complications      PLAN     X-rays of the left knee recently performed on 4/20/2023 are independently reviewed by myself today with no acute findings noted.  Patient has evidence of moderate osteoarthritic changes affecting the medial and patellofemoral compartments.  There is also evidence of a small suprapatellar joint effusion on the lateral view.  Patient is here today complaining of acute left knee pain/swelling as well as extensive bruising in his left upper leg.  Patient  sustained an acute fall while golfing about 3 weeks ago with increased left knee/leg pain/swelling/bruising since that time.  On initial physical exam, the patient was sitting with his knee flexed and appeared to have a large joint effusion.  We discussed proceeding with therapeutic aspiration of his left knee followed by an intra-articular injection of steroid.  Patient tolerated this procedure well, but there was no return on the aspiration attempt and when the patient is lying down with his left knee extended, his physical presentation appears more consistent with a large hematoma along the lateral aspect of his left knee/leg.  The patient was still given an intra-articular injection of steroid into the left knee for management of joint pain/inflammation/swelling. An Ace wrap was applied following the procedure for some added support and compression.  We discussed that the hematoma will likely take several weeks to fully resolve.  Since he is 3 weeks post injury, we discussed and have recommended use of moist heat to this area to help dissolve and resorb the hematoma.  Patient verbalized understanding of all instructions given today.    It is unclear as to why he has such extensive ecchymosis along the medial aspect of his right thigh as well as the lateral aspect of his left thigh/knee.  He is not on any anticoagulation therapy.  We discussed the possibility of internal derangement, ligament tears/injuries, muscle/tendon tears/injuries, etc, which could result in such bruising.  Patient may need repeat MRI imaging if he is not progressively improving over the next couple of weeks.  The patient had a previous MRI of his left knee performed by another provider in May 2021, which showed moderate osteoarthritic changes as well as a degenerative tear of the medial meniscus.    Recommend the following:    -Rest and activity modification as tolerated and based on pain.  -Modified weightbearing of the affected extremity  with use of a cane or walker as needed.  -Gradual progression of weightbearing and activity as pain and swelling allow.  -Conditioning and strengthening exercises of the bilateral knees/legs.  -Elevation and ice therapy to the affected knee to minimize pain/swelling/inflammation.   -Tylenol, Aleve or Ibuprofen as needed for pain/discomfort.    Follow-up in 2 weeks for recheck.  Consider repeat MRI of the left knee if indicated.  Consider referral to physical therapy.    Time spent of a minimum of 20 minutes including the face to face evaluation, reviewing of medical history and prior medial records, reviewing of diagnostic studies, documentation, patient education and coordination of care.     EMR Dragon/Transciption Disclaimer: Some of this note may be an electronic transcription/translation of spoken language to printed text using the Dragon Dictation System.     Return in about 2 weeks (around 5/12/2023) for Recheck.        This document has been electronically signed by GEMINI Escobedo on May 1, 2023 13:19 CDT      GEMINI Escobedo

## 2023-05-01 RX ORDER — LIDOCAINE HYDROCHLORIDE 10 MG/ML
2 INJECTION, SOLUTION INFILTRATION; PERINEURAL
Status: COMPLETED | OUTPATIENT
Start: 2023-04-28 | End: 2023-04-28

## 2023-05-01 RX ORDER — TRIAMCINOLONE ACETONIDE 40 MG/ML
40 INJECTION, SUSPENSION INTRA-ARTICULAR; INTRAMUSCULAR
Status: COMPLETED | OUTPATIENT
Start: 2023-04-28 | End: 2023-04-28

## 2023-05-12 ENCOUNTER — OFFICE VISIT (OUTPATIENT)
Dept: ORTHOPEDIC SURGERY | Facility: CLINIC | Age: 73
End: 2023-05-12
Payer: MEDICARE

## 2023-05-12 VITALS — BODY MASS INDEX: 32.13 KG/M2 | HEIGHT: 68 IN | WEIGHT: 211.98 LBS

## 2023-05-12 DIAGNOSIS — M23.204 DEGENERATIVE TEAR OF LEFT MEDIAL MENISCUS: ICD-10-CM

## 2023-05-12 DIAGNOSIS — M25.562 CHRONIC PAIN OF LEFT KNEE: Primary | ICD-10-CM

## 2023-05-12 DIAGNOSIS — M17.12 PRIMARY OSTEOARTHRITIS OF LEFT KNEE: ICD-10-CM

## 2023-05-12 DIAGNOSIS — S80.02XA HEMATOMA OF LEFT KNEE REGION: ICD-10-CM

## 2023-05-12 DIAGNOSIS — G89.29 CHRONIC PAIN OF LEFT KNEE: Primary | ICD-10-CM

## 2023-05-12 DIAGNOSIS — M25.462 EFFUSION OF LEFT KNEE: ICD-10-CM

## 2023-05-12 NOTE — PROGRESS NOTES
Hung Ramsay is a 72 y.o. male returns for     Chief Complaint   Patient presents with   • Left Knee - Follow-up, Pain, Edema     HISTORY OF PRESENT ILLNESS: Patient presents to office for follow-up of acute left knee/thigh pain/swelling/bruising.  Initial onset of his current symptoms occurred approximately 5 weeks ago when he sustained a fall while golfing.  Patient was initially evaluated in office on 4/28/2023 with significant swelling and bruising primarily in the lateral aspect of the left knee/distal thigh as well as the medial aspect of the left thigh.  Patient was also noted to have a fluid collection with associated bruising in the lateral aspect of the left knee/upper leg that was most consistent with a hematoma.  Patient was given a trial of an intra-articular injection of steroid into the left knee on that date.  Patient does have a history of chronic left knee pain and was previously evaluated/treated/followed per Dr. Mercado.  He has been treated in the past with intra-articular injections of steroid and prescription oral NSAIDs.  Previous MRI imaging of the left knee from May 2021 showed moderate osteoarthritic changes as well as a degenerative tear of the medial meniscus at that time.  Overall, patient is doing well and has continued to have progressive and gradual improvement in his pain/swelling/symptoms.  Patient continues to have persistent swelling in the lateral aspect of the left knee/distal thigh.  The bruising is gradually improving/fading.  The patient is not having any difficulty ambulating or bearing weight.  No new complaints or concerns noted since last office visit.  No new falls or injuries reported since last office visit.  Current pain scale is 1/10.     CONCURRENT MEDICAL HISTORY:    The following portions of the patient's history were reviewed and updated as appropriate: allergies, current medications, past family history, past medical history, past social history, past  "surgical history and problem list.     ROS  No fevers or chills.  No chest pain or shortness of air.  No GI or  disturbances. Left knee pain/swelling.     PHYSICAL EXAMINATION:       Ht 172.7 cm (67.99\")   Wt 96.2 kg (211 lb 15.7 oz)   BMI 32.24 kg/m²     Physical Exam  Vitals reviewed.   Constitutional:       General: He is not in acute distress.     Appearance: He is well-developed. He is not ill-appearing.   HENT:      Head: Normocephalic.   Pulmonary:      Effort: Pulmonary effort is normal. No respiratory distress.   Abdominal:      General: There is no distension.      Palpations: Abdomen is soft.   Musculoskeletal:         General: Swelling (Left knee/thigh), tenderness (Mild, left knee/thigh) and signs of injury (Left knee/leg) present. No deformity.      Left knee:      Instability Tests: Medial Carlyn test positive. Lateral Carlyn test negative.   Skin:     General: Skin is warm and dry.      Capillary Refill: Capillary refill takes less than 2 seconds.      Findings: No erythema.   Neurological:      Mental Status: He is alert and oriented to person, place, and time.      GCS: GCS eye subscore is 4. GCS verbal subscore is 5. GCS motor subscore is 6.   Psychiatric:         Speech: Speech normal.         Behavior: Behavior normal.         Thought Content: Thought content normal.         Judgment: Judgment normal.         GAIT:     []  Normal  [x]  Antalgic (mild)    Assistive device: [x]  None  []  Walker     []  Crutches  []  Cane     []  Wheelchair  []  Stretcher    Left Knee Exam     Tenderness   Left knee tenderness location: Mild, diffuse.    Range of Motion   Extension: 0   Flexion: 120     Tests   Carlyn:  Medial - positive Lateral - negative  Varus: negative Valgus: negative    Other   Erythema: absent  Sensation: normal  Pulse: present  Swelling: moderate    Comments:  Mild pain and limitations with range of motion, improved from prior exam.  There is again a moderate-sized mass/fluid " collection in the lateral aspect of the left knee/leg consistent with hematoma.  The previously noted ecchymosis and discoloration have significantly improved.  There is some faint ecchymosis remaining but this has nearly resolved.  Patient is able to easily perform a straight leg raise and tendon function is intact.  Skin is intact.  No deformity.  No signs of infection noted.             XR Knee 3 View Left    Result Date: 4/20/2023  Narrative: HISTORY: Left knee swelling. COMPARISON: 8/27/2020 FINDINGS: AP, lateral and sunrise views of the left knee demonstrate mild tricompartmental osteoarthritis.  There is no fracture or dislocation.     Impression: Mild tricompartmental osteoarthritis.        ASSESSMENT:    Diagnoses and all orders for this visit:    Chronic pain of left knee    Effusion of left knee    Primary osteoarthritis of left knee    Degenerative tear of left medial meniscus    Hematoma of left knee region    PLAN    Patient is doing well overall and has continued to experience progressive and gradual improvement in his left knee/leg pain/swelling/bruising.  Patient sustained a fall while playing golf about 5 weeks ago.  Patient has known moderate osteoarthritic changes in his left knee as well as previous evidence of a degenerative medial meniscus tear on MRI imaging performed in 2021.  He does have a history of chronic left knee pain but he sustained a recent acute injury and had acute pain/swelling/bruising in the areas described.  Again, it remains unclear as to why he had such extensive ecchymosis along the medial aspect of his right thigh as well as the lateral aspect of his left distal thigh/knee area.  He is not on any anticoagulation therapy other than an aspirin.  We again discussed the possibility of internal derangement, ligament tears/injuries, muscle/tendon tears/injuries, etc. which could all result in such bruising.  Tendon function is intact on physical exam with no signs suggestive of  quadriceps tendon tear or rupture.  The patient is able to easily perform a straight leg raise.  He does continue to have a moderate-sized mass/fluid collection along the lateral aspect of his left knee/distal thigh that remains most consistent with a hematoma.  The previous discoloration/erythema/bruising has nearly fully resolved.  The patient is ambulating in office with no significant difficulties and a slightly antalgic gait, which may be his baseline.  He is not having any difficulty bearing weight and suspicion for fracture is quite low at this point.    In regards to the hematoma, we discussed that it would take several weeks to fully resolve.  At this point, I have encouraged the patient to use moist heat to the area 3-4 times daily, which will help to dissolve the hematoma as his body resorbs it.  Otherwise, the patient can continue with gradual progression of weightbearing and activity as pain and swelling allow.  We discussed rest and activity modification if he experiences any increased pain/symptoms.  We also discussed modified weightbearing off the left knee/leg with use of a cane if his pain worsens.  Recommend Tylenol, Aleve or Ibuprofen as needed for pain/discomfort.    Follow-up in 2 to 4 weeks for recheck as needed for any new, worsening or persistent symptoms.  Consider MRI imaging of the left knee if indicated.  Consider referral to physical therapy.    Time spent of a minimum of 20 minutes including the face to face evaluation, reviewing of medical history and prior medial records, reviewing of diagnostic studies, documentation, patient education and coordination of care.     EMR Dragon/Transciption Disclaimer: Some of this note may be an electronic transcription/translation of spoken language to printed text using the Dragon Dictation System.     Return in about 4 weeks (around 6/9/2023), or if symptoms worsen or fail to improve, for Recheck.        This document has been electronically  signed by GEMINI Escobedo on May 12, 2023 11:07 CDT      GEMINI Escobedo

## 2023-05-17 DIAGNOSIS — M25.462 PAIN AND SWELLING OF LEFT KNEE: ICD-10-CM

## 2023-05-17 DIAGNOSIS — M25.562 PAIN AND SWELLING OF LEFT KNEE: ICD-10-CM

## 2023-05-17 RX ORDER — MELOXICAM 15 MG/1
TABLET ORAL
Qty: 30 TABLET | Refills: 0 | Status: SHIPPED | OUTPATIENT
Start: 2023-05-17

## 2023-05-18 DIAGNOSIS — E11.9 TYPE 2 DIABETES MELLITUS WITHOUT COMPLICATION, WITHOUT LONG-TERM CURRENT USE OF INSULIN: ICD-10-CM

## 2023-06-01 DIAGNOSIS — I10 ESSENTIAL HYPERTENSION: ICD-10-CM

## 2023-06-01 RX ORDER — LOSARTAN POTASSIUM AND HYDROCHLOROTHIAZIDE 25; 100 MG/1; MG/1
TABLET ORAL
Qty: 90 TABLET | Refills: 0 | Status: SHIPPED | OUTPATIENT
Start: 2023-06-01

## 2023-07-06 PROBLEM — K30 ACID INDIGESTION: Status: ACTIVE | Noted: 2023-07-06

## 2023-07-06 PROBLEM — R10.13 EPIGASTRIC PAIN: Status: ACTIVE | Noted: 2023-07-06

## 2023-08-01 DIAGNOSIS — G47.00 INSOMNIA, UNSPECIFIED TYPE: Primary | ICD-10-CM

## 2023-08-01 DIAGNOSIS — I10 ESSENTIAL HYPERTENSION: ICD-10-CM

## 2023-08-01 RX ORDER — LOSARTAN POTASSIUM AND HYDROCHLOROTHIAZIDE 25; 100 MG/1; MG/1
TABLET ORAL
Qty: 90 TABLET | Refills: 0 | Status: SHIPPED | OUTPATIENT
Start: 2023-08-01

## 2023-08-01 RX ORDER — TRAZODONE HYDROCHLORIDE 100 MG/1
100 TABLET ORAL NIGHTLY
Qty: 30 TABLET | Refills: 5 | Status: SHIPPED | OUTPATIENT
Start: 2023-08-01

## 2023-08-02 ENCOUNTER — HOSPITAL ENCOUNTER (OUTPATIENT)
Dept: NUCLEAR MEDICINE | Facility: HOSPITAL | Age: 73
Discharge: HOME | End: 2023-08-02
Payer: MEDICARE

## 2023-08-02 DIAGNOSIS — R10.11 RIGHT UPPER QUADRANT PAIN: ICD-10-CM

## 2023-08-02 RX ORDER — SUCRALFATE 1 G/1
1 TABLET ORAL 4 TIMES DAILY
Qty: 120 TABLET | Refills: 1 | Status: SHIPPED | OUTPATIENT
Start: 2023-08-02

## 2023-08-03 ENCOUNTER — HOSPITAL ENCOUNTER (OUTPATIENT)
Dept: NUCLEAR MEDICINE | Facility: HOSPITAL | Age: 73
Discharge: HOME | End: 2023-08-03
Payer: MEDICARE

## 2023-08-03 PROCEDURE — 0 TECHNETIUM TC 99M MEBROFENIN KIT: Performed by: NURSE PRACTITIONER

## 2023-08-03 PROCEDURE — A9537 TC99M MEBROFENIN: HCPCS | Performed by: NURSE PRACTITIONER

## 2023-08-03 PROCEDURE — 78226 HEPATOBILIARY SYSTEM IMAGING: CPT

## 2023-08-03 RX ORDER — KIT FOR THE PREPARATION OF TECHNETIUM TC 99M MEBROFENIN 45 MG/10ML
1 INJECTION, POWDER, LYOPHILIZED, FOR SOLUTION INTRAVENOUS
Status: COMPLETED | OUTPATIENT
Start: 2023-08-03 | End: 2023-08-03

## 2023-08-03 RX ADMIN — MEBROFENIN 1 DOSE: 45 INJECTION, POWDER, LYOPHILIZED, FOR SOLUTION INTRAVENOUS at 10:12

## 2023-08-23 ENCOUNTER — OFFICE VISIT (OUTPATIENT)
Dept: GASTROENTEROLOGY | Facility: CLINIC | Age: 73
End: 2023-08-23
Payer: MEDICARE

## 2023-08-23 VITALS
DIASTOLIC BLOOD PRESSURE: 84 MMHG | HEART RATE: 80 BPM | HEIGHT: 67 IN | WEIGHT: 212.6 LBS | SYSTOLIC BLOOD PRESSURE: 144 MMHG | BODY MASS INDEX: 33.37 KG/M2

## 2023-08-23 DIAGNOSIS — K21.00 GASTROESOPHAGEAL REFLUX DISEASE WITH ESOPHAGITIS WITHOUT HEMORRHAGE: Primary | ICD-10-CM

## 2023-08-23 DIAGNOSIS — K29.30 CHRONIC SUPERFICIAL GASTRITIS WITHOUT BLEEDING: ICD-10-CM

## 2023-08-23 PROCEDURE — 3079F DIAST BP 80-89 MM HG: CPT | Performed by: NURSE PRACTITIONER

## 2023-08-23 PROCEDURE — 3077F SYST BP >= 140 MM HG: CPT | Performed by: NURSE PRACTITIONER

## 2023-08-23 PROCEDURE — 1159F MED LIST DOCD IN RCRD: CPT | Performed by: NURSE PRACTITIONER

## 2023-08-23 PROCEDURE — 99213 OFFICE O/P EST LOW 20 MIN: CPT | Performed by: NURSE PRACTITIONER

## 2023-08-23 PROCEDURE — 1160F RVW MEDS BY RX/DR IN RCRD: CPT | Performed by: NURSE PRACTITIONER

## 2023-08-23 NOTE — PROGRESS NOTES
Chief Complaint   Patient presents with    Heartburn    Abdominal Pain       Subjective    Hung Ramsay is a 72 y.o. male. he is here today for follow-up.                                                                  Assessment & Plan                                     1. Gastroesophageal reflux disease with esophagitis without hemorrhage    2. Chronic superficial gastritis without bleeding      Plan; continue Protonix daily avoid gastric irritants follow standard antireflux measures.  Follow-up in 6 months for recheck return to GI office sooner if needed.    Follow-up: Return in about 6 months (around 2/23/2024) for Recheck.     HPI  72-year-old male presents to discuss upper abdominal pain HIDA scan results and GERD.  States since starting medication daily symptoms have been much better still has intermittent pain but overall better denies any nausea or vomiting recently is able to eat well finish full course of Carafate still has occasional diarrhea but overall bowel movements have been more regular previous ultrasound noted fatty liver hepatomegaly contracted gallbladder without obvious stone.  Follow-up HIDA scan completed 8/2/2023 noted ejection fraction 96% no findings of cystic duct obstruction normal gallbladder ejection fraction there was nonspecific delayed biliary to bowel transit discussed possible differentials of normal variant, low-grade/partial biliary obstruction, chronic cholecystitis, sphincter of Oddi dysfunction medication side effect and hepatocellular dysfunction causing cholestasis and offered surgical referral however patient reports symptoms are stable at this time and declines referral.  EGD 7/11/23 consistent with grade 2 esophagitis gastritis and normal duodenum.  Antral biopsy noted reactive gastropathy negative for H. pylori or dysplasia.  Esophageal biopsy  "consistent with reflux esophagitis.  Review of Systems  Review of Systems   Constitutional:  Negative for activity change, appetite change, chills, diaphoresis, fatigue, fever and unexpected weight change.   HENT:  Negative for sore throat and trouble swallowing.    Respiratory:  Negative for shortness of breath.    Gastrointestinal:  Positive for abdominal pain (less frequent still intermittent (more at night) dull pressure/ache). Negative for abdominal distention, anal bleeding, blood in stool, constipation, diarrhea, nausea, rectal pain and vomiting.   Musculoskeletal:  Negative for arthralgias.   Skin:  Negative for pallor.   Neurological:  Negative for light-headedness.     /84 (BP Location: Left arm)   Pulse 80   Ht 170.2 cm (67\")   Wt 96.4 kg (212 lb 9.6 oz)   BMI 33.30 kg/mý     Objective      Physical Exam  Constitutional:       General: He is not in acute distress.     Appearance: Normal appearance. He is normal weight. He is not ill-appearing or toxic-appearing.   HENT:      Head: Normocephalic and atraumatic.   Pulmonary:      Effort: Pulmonary effort is normal.   Abdominal:      General: Abdomen is flat. Bowel sounds are normal. There is no distension.      Palpations: Abdomen is soft. There is no mass.      Tenderness: There is no abdominal tenderness.   Neurological:      Mental Status: He is alert.             The following portions of the patient's history were reviewed and updated as appropriate:   Past Medical History:   Diagnosis Date    Anxiety     Diabetes     Essential hypertension     GERD (gastroesophageal reflux disease)     Hyperlipidemia     Insomnia     Type 2 diabetes mellitus      Past Surgical History:   Procedure Laterality Date    COLECTOMY PARTIAL / TOTAL      COLONOSCOPY N/A 5/8/2019    Procedure: COLONOSCOPY;  Surgeon: Cesar Santos MD;  Location: [facility] ENDOSCOPY;  Service: Gastroenterology    ENDOSCOPY N/A 7/11/2023    Procedure: ESOPHAGOGASTRODUODENOSCOPY possible " dilation;  Surgeon: Cesar Santos MD;  Location: [facility] ENDOSCOPY;  Service: Gastroenterology;  Laterality: N/A;    ENDOSCOPY AND COLONOSCOPY  04/19/2012     Family History   Problem Relation Age of Onset    Diabetes Mother     Hypertension Mother     Heart disease Mother     Cancer Father        No Known Allergies  Social History     Socioeconomic History    Marital status:    Tobacco Use    Smoking status: Former    Smokeless tobacco: Former    Tobacco comments:     quit 50 yrs ago   Substance and Sexual Activity    Alcohol use: No    Drug use: No    Sexual activity: Defer     Current Medications:  Prior to Admission medications    Medication Sig Start Date End Date Taking? Authorizing Provider   ALPRAZolam (Xanax) 0.5 MG tablet Take 1 tablet by mouth 3 (Three) Times a Day As Needed for Anxiety. 7/5/23  Yes Elvira Ramsay APRN   carbidopa (LODOSYN) 25 MG tablet TAKE 1 TABLET BY MOUTH THREE TIMES A DAY  Patient taking differently: Take 1 tablet by mouth 3 (Three) Times a Day. 11/28/22  Yes Elvira Ramsay APRN   diclofenac (VOLTAREN) 75 MG EC tablet Take 1 tablet by mouth 2 (Two) Times a Day As Needed (pain). 2/11/19  Yes Elvira Ramsay APRN   glipizide (GLUCOTROL) 5 MG tablet TAKE 1 TABLET BY MOUTH 2 TIMES A DAY BEFORE MEALS.  Patient taking differently: Take 1 tablet by mouth 2 (Two) Times a Day Before Meals. 11/28/22  Yes Elvira Ramsay APRN   glucose blood test strip Use as instructed 11/23/20  Yes Elvira Ramsay APRN   glucose monitor monitoring kit 1 each Daily. 11/23/20  Yes Elvira Ramsay APRN   hydrALAZINE (APRESOLINE) 25 MG tablet Take 1 tablet by mouth 2 (Two) Times a Day. 10/22/20  Yes Elvira Ramsay APRN   losartan-hydrochlorothiazide (HYZAAR) 100-25 MG per tablet TAKE 1 TABLET BY MOUTH EVERY DAY 8/1/23  Yes Elvira Ramsay APRN   metFORMIN (GLUCOPHAGE) 1000 MG tablet TAKE 1 TABLET BY MOUTH TWICE A DAY WITH MEALS  Patient taking differently: Take 1 tablet by mouth 2 (Two) Times a Day With  Meals. 5/19/23  Yes Elvira Ramsay APRN   nystatin (MYCOSTATIN) 416977 UNIT/GM cream Apply 1 application topically to the appropriate area as directed 2 (Two) Times a Day. 8/31/22  Yes Elvira Ramsay APRN   ondansetron ODT (ZOFRAN-ODT) 4 MG disintegrating tablet Place 1 tablet on the tongue Every 8 (Eight) Hours As Needed for Nausea or Vomiting. 7/4/23  Yes Hi Martinez MD   pantoprazole (PROTONIX) 40 MG EC tablet Take 1 tablet by mouth Daily. 7/19/23  Yes Julia Diaz APRN   PARoxetine CR (PAXIL-CR) 37.5 MG 24 hr tablet TAKE 1 TABLET BY MOUTH EVERY DAY IN THE MORNING  Patient taking differently: Take 1 tablet by mouth Every Morning. 10/31/22  Yes Elvira Ramsay APRN   pravastatin (PRAVACHOL) 40 MG tablet TAKE 1 TABLET BY MOUTH EVERYDAY AT BEDTIME  Patient taking differently: Take 1 tablet by mouth Every Night. 7/3/23  Yes Elvira Ramsay APRN   sildenafil (REVATIO) 20 MG tablet USE 1 TO 5 TABLETS AS NEEDED FOR ERECTILE DYSFUNCTION. DO NOT EXCEED 100MG IN 24 HOURS.  Patient taking differently: Take 1 tablet by mouth Take As Directed. USE 1 TO 5 TABLETS AS NEEDED FOR ERECTILE DYSFUNCTION. DO NOT EXCEED 100MG IN 24 HOURS. 2/21/22  Yes Elvira Ramsay APRN   sucralfate (Carafate) 1 g tablet Take 1 tablet by mouth 4 (Four) Times a Day. 8/2/23  Yes Julia Diaz APRN   traZODone (DESYREL) 100 MG tablet Take 1 tablet by mouth Every Night. 8/1/23  Yes Elvira Ramsay APRN   Vascepa 1 g capsule capsule TAKE 2 CAPSULES BY MOUTH 2 TIMES A DAY WITH MEALS.  Patient taking differently: 2 g 2 (Two) Times a Day With Meals. 8/16/21  Yes Elvira Ramsay APRN     Orders placed during this encounter include:  No orders of the defined types were placed in this encounter.    * Surgery not found *  No orders of the defined types were placed in this encounter.        Review and/or summary of lab tests, radiology, procedures, medications. Review and summary of old records and obtaining of history. The risks and benefits of my  recommendations, as well as other treatment options were discussed . Any questions/concerned were answered. Patient voiced understanding and agreement.          This document has been electronically signed by GEMINI Zuñiga on 2023 13:43 CDT                                               Results for orders placed or performed during the hospital encounter of 23   TISSUE EXAM, [facility] ([facility])    Specimen: A: Gastric, Antrum; Tissue    B: Esophagus, Distal; Tissue   Result Value Ref Range    Reference Lab Report       [facility]  [address] [place] [address]  Phone: [phone] or [phone]  Fax: [phone]  Jack Rudolph M.D., Medical Director    PATIENT NAME                                     LABORATORY NO.  MIGUEL AZAR.                            [id number]  [id number]                                 AGE                    SEX   SSN              CLIENT REF #  [facility]                   72        1950           [id number]      [id number]    [place]                               REQUESTING M.D.           ATTENDING M.D.         COPY TO[address]                         [place] [address]                     DATE COLLECTED            DATE RECEIVED          DATE REPORTED  2023    DIAGNOSIS:  A.     GASTRIC ANTRUM, BIOPSY:  Gastric antral type mucosa with reactive (chemical) gastropathy  Negative H.  pylori, metaplasia or dysplasia  B.     ESOPHAGUS, BIOPSY, DISTAL:  Reflux esophagitis (no eosinophils identified)  Negative for intestinal metaplasia, dysplasia or malignancy    RLL    CLINICAL HISTORY:  Epigastric pain  Acid indigestion    SPECIMENS RECEIVED:  A.    GASTRIC ANTRUM, BIOPSY  B.    ESOPHAGUS, BIOPSY, DISTAL    MICROSCOPIC DESCRIPTION:  Tissue blocks are prepared and slides are  examined microscopically on all  specimens. See diagnosis for details.    Professional interpretation rendered by Jack Rudolph M.D., FREDDY at  [employer], [address] [place] [address].    GROSS DESCRIPTION:  A.    Labeled gastric antrum are 3 portions of tan soft tissue measuring 0.6 x 0.6  x 0.1 cm in aggregate.  Submitted entirely in 1 block.  BW  B.    Labeled distal esophagus are 2 portions of tan soft tissue measuring 0.5 x  0.5 x 0.1 cm in aggregate.  Submitted entirely in 1 block.    REVIEWED, DIAGNOSED AND ELECTRONICALLY  SIGNED BY:    Jack Rudolph M.D.,  JASON.  CPT CODES:  88305x2     POC Glucose Once    Specimen: Blood   Result Value Ref Range    Glucose 178 (H) 70 - 130 mg/dL   Results for orders placed or performed during the hospital encounter of 07/04/23   Single High Sensitivity Troponin T    Specimen: Blood   Result Value Ref Range    HS Troponin T 13 <15 ng/L   Single High Sensitivity Troponin T    Specimen: Blood   Result Value Ref Range    HS Troponin T 12 <15 ng/L   Gold Top - SST   Result Value Ref Range    Extra Tube Hold for add-ons.    Green Top (Gel)   Result Value Ref Range    Extra Tube Hold for add-ons.    Urinalysis With Microscopic If Indicated (No Culture) - Urine, Clean Catch    Specimen: Urine, Clean Catch   Result Value Ref Range    Color, UA Yellow Yellow, Straw, Dark Yellow, Saba    Appearance, UA Clear Clear    pH, UA <=5.0 5.0 - 9.0    Specific Gravity, UA 1.023 1.003 - 1.030    Glucose, UA Negative Negative    Ketones, UA Trace (A) Negative    Bilirubin, UA Negative Negative    Blood, UA Negative Negative    Protein, UA Trace (A) Negative    Leuk Esterase, UA Negative Negative    Nitrite, UA Negative Negative    Urobilinogen, UA 0.2 E.U./dL 0.2 - 1.0 E.U./dL   CBC Auto Differential    Specimen: Blood   Result Value Ref Range    WBC 12.85 (H) 3.40 - 10.80 10*3/mm3    RBC 4.54 4.14 - 5.80 10*6/mm3    Hemoglobin 14.4 13.0 - 17.7 g/dL    Hematocrit  41.5 37.5 - 51.0 %    MCV 91.4 79.0 - 97.0 fL    MCH 31.7 26.6 - 33.0 pg    MCHC 34.7 31.5 - 35.7 g/dL    RDW 13.0 12.3 - 15.4 %    RDW-SD 43.5 37.0 - 54.0 fl    MPV 10.9 6.0 - 12.0 fL    Platelets 226 140 - 450 10*3/mm3    Neutrophil % 75.0 42.7 - 76.0 %    Lymphocyte % 16.1 (L) 19.6 - 45.3 %    Monocyte % 6.7 5.0 - 12.0 %    Eosinophil % 1.5 0.3 - 6.2 %    Basophil % 0.2 0.0 - 1.5 %    Immature Grans % 0.5 0.0 - 0.5 %    Neutrophils, Absolute 9.64 (H) 1.70 - 7.00 10*3/mm3    Lymphocytes, Absolute 2.07 0.70 - 3.10 10*3/mm3    Monocytes, Absolute 0.86 0.10 - 0.90 10*3/mm3    Eosinophils, Absolute 0.19 0.00 - 0.40 10*3/mm3    Basophils, Absolute 0.02 0.00 - 0.20 10*3/mm3    Immature Grans, Absolute 0.07 (H) 0.00 - 0.05 10*3/mm3    nRBC 0.0 0.0 - 0.2 /100 WBC   Lavender Top   Result Value Ref Range    Extra Tube hold for add-on    Light Blue Top   Result Value Ref Range    Extra Tube Hold for add-ons.    aPTT    Specimen: Blood   Result Value Ref Range    PTT 33.1 20.0 - 40.3 seconds   Protime-INR    Specimen: Blood   Result Value Ref Range    Protime 13.7 11.1 - 15.3 Seconds    INR 1.05 0.80 - 1.20   D-dimer, Quantitative    Specimen: Blood   Result Value Ref Range    D-Dimer, Quantitative 880 (H) 0 - 730 ng/mL (FEU)   BNP    Specimen: Blood   Result Value Ref Range    proBNP 67.7 0.0 - 900.0 pg/mL   Lipase    Specimen: Blood   Result Value Ref Range    Lipase 24 13 - 60 U/L   Blood Gas, Arterial -    Specimen: Arterial Blood   Result Value Ref Range    Site Left Radial     Ray's Test N/A     pH, Arterial 7.395 7.350 - 7.450 pH units    pCO2, Arterial 38.4 35.0 - 45.0 mm Hg    pO2, Arterial 77.5 (L) 83.0 - 108.0 mm Hg    HCO3, Arterial 23.5 20.0 - 26.0 mmol/L    Base Excess, Arterial -1.2 (L) 0.0 - 2.0 mmol/L    O2 Saturation, Arterial 96.1 94.0 - 99.0 %    Barometric Pressure for Blood Gas 750 mmHg    Modality Room Air     Ventilator Mode NA     Collected by TRISTON Pennington    Comprehensive Metabolic Panel    Specimen:  Blood   Result Value Ref Range    Glucose 155 (H) 65 - 99 mg/dL    BUN 23 8 - 23 mg/dL    Creatinine 1.32 (H) 0.76 - 1.27 mg/dL    Sodium 137 136 - 145 mmol/L    Potassium 3.7 3.5 - 5.2 mmol/L    Chloride 101 98 - 107 mmol/L    CO2 22.0 22.0 - 29.0 mmol/L    Calcium 9.4 8.6 - 10.5 mg/dL    Total Protein 7.0 6.0 - 8.5 g/dL    Albumin 4.3 3.5 - 5.2 g/dL    ALT (SGPT) <5 1 - 41 U/L    AST (SGOT) 12 1 - 40 U/L    Alkaline Phosphatase 72 39 - 117 U/L    Total Bilirubin 0.5 0.0 - 1.2 mg/dL    Globulin 2.7 gm/dL    A/G Ratio 1.6 g/dL    BUN/Creatinine Ratio 17.4 7.0 - 25.0    Anion Gap 14.0 5.0 - 15.0 mmol/L    eGFR 57.0 (L) >60.0 mL/min/1.73   ECG 12 Lead Chest Pain   Result Value Ref Range    QT Interval 388 ms    QTC Interval 447 ms   Results for orders placed or performed during the hospital encounter of 07/01/23   Single High Sensitivity Troponin T    Specimen: Blood   Result Value Ref Range    HS Troponin T 10 <15 ng/L   Gold Top - SST   Result Value Ref Range    Extra Tube Hold for add-ons.    Green Top (Gel)   Result Value Ref Range    Extra Tube Hold for add-ons.    CBC Auto Differential    Specimen: Blood   Result Value Ref Range    WBC 12.52 (H) 3.40 - 10.80 10*3/mm3    RBC 4.27 4.14 - 5.80 10*6/mm3    Hemoglobin 13.8 13.0 - 17.7 g/dL    Hematocrit 39.0 37.5 - 51.0 %    MCV 91.3 79.0 - 97.0 fL    MCH 32.3 26.6 - 33.0 pg    MCHC 35.4 31.5 - 35.7 g/dL    RDW 12.9 12.3 - 15.4 %    RDW-SD 42.3 37.0 - 54.0 fl    MPV 11.0 6.0 - 12.0 fL    Platelets 200 140 - 450 10*3/mm3    Neutrophil % 77.6 (H) 42.7 - 76.0 %    Lymphocyte % 15.8 (L) 19.6 - 45.3 %    Monocyte % 5.5 5.0 - 12.0 %    Eosinophil % 0.4 0.3 - 6.2 %    Basophil % 0.3 0.0 - 1.5 %    Immature Grans % 0.4 0.0 - 0.5 %    Neutrophils, Absolute 9.71 (H) 1.70 - 7.00 10*3/mm3    Lymphocytes, Absolute 1.98 0.70 - 3.10 10*3/mm3    Monocytes, Absolute 0.69 0.10 - 0.90 10*3/mm3    Eosinophils, Absolute 0.05 0.00 - 0.40 10*3/mm3    Basophils, Absolute 0.04 0.00 - 0.20  10*3/mm3    Immature Grans, Absolute 0.05 0.00 - 0.05 10*3/mm3    nRBC 0.0 0.0 - 0.2 /100 WBC     *Note: Due to a large number of results and/or encounters for the requested time period, some results have not been displayed. A complete set of results can be found in Results Review.

## 2023-08-28 DIAGNOSIS — G47.00 INSOMNIA, UNSPECIFIED TYPE: ICD-10-CM

## 2023-08-28 RX ORDER — TRAZODONE HYDROCHLORIDE 100 MG/1
100 TABLET ORAL NIGHTLY
Qty: 90 TABLET | Refills: 0 | Status: SHIPPED | OUTPATIENT
Start: 2023-08-28

## 2023-09-26 RX ORDER — SUCRALFATE 1 G/1
TABLET ORAL
Qty: 120 TABLET | Refills: 1 | Status: SHIPPED | OUTPATIENT
Start: 2023-09-26

## (undated) DEVICE — CANN SMPL SOFTECH BIFLO ETCO2 A/M 7FT